# Patient Record
Sex: FEMALE | Race: WHITE | NOT HISPANIC OR LATINO | Employment: OTHER | URBAN - METROPOLITAN AREA
[De-identification: names, ages, dates, MRNs, and addresses within clinical notes are randomized per-mention and may not be internally consistent; named-entity substitution may affect disease eponyms.]

---

## 2018-02-07 PROBLEM — E78.5 DYSLIPIDEMIA: Status: ACTIVE | Noted: 2018-02-07

## 2019-01-28 ENCOUNTER — PATIENT OUTREACH (OUTPATIENT)
Dept: HEALTH INFORMATION MANAGEMENT | Facility: OTHER | Age: 84
End: 2019-01-28

## 2019-01-28 NOTE — PROGRESS NOTES
1. Attempt #:1    2. HealthConnect Verified: yes    3. Verify PCP: yes    4. Review Care Team: yes    5. WebIZ Checked & Epic Updated: Yes  · WebIZ Recommendations: FLU, PREVNAR (PCV13) , TDAP and SHINGRIX (Shingles)  · Is patient due for Tdap? YES. Patient was not notified of copay/out of pocket cost.  · Is patient due for Shingles? YES. Patient was not notified of copay/out of pocket cost.    6. Reviewed/Updated the following with patient:       •   Communication Preference Obtained? YES       •   Preferred Pharmacy? YES       •   Preferred Lab? YES       •   Family History (document living status of immediate family members and if + hx of cancer, diabetes, hypertension, hyperlipidemia, heart attack, stroke) YES    7. Annual Wellness Visit Scheduling  · Scheduling Status:Scheduled     8. Care Gap Scheduling (Attempt to Schedule EACH Overdue Care Gap!)     Health Maintenance Due   Topic Date Due   • IMM DTaP/Tdap/Td Vaccine (1 - Tdap) 01/11/1954   • IMM ZOSTER VACCINES (1 of 2) 01/11/1985   • COLONOSCOPY  06/16/1998   • MAMMOGRAM  10/14/1999   • BONE DENSITY  01/11/2000   • IMM PNEUMOCOCCAL 65+ (ADULT) LOW/MEDIUM RISK SERIES (1 of 2 - PCV13) 01/11/2000   • IMM INFLUENZA (1) 09/01/2018        Scheduled patient for Annual Wellness Visit     9. Spot Coffee Activation: already active    10. Spot Coffee Rodriguez: NO    11. Virtual Visits: NO    12. Opt In to Text Messages: NO    13. Patient was advised: “This is a free wellness visit. The provider will screen for medical conditions to help you stay healthy. If you have other concerns to address you may be asked to discuss these at a separate visit or there may be an additional fee.”     14. Patient was informed to arrive 15 min prior to their scheduled appointment and bring in their medication bottles.

## 2019-01-31 ENCOUNTER — PATIENT OUTREACH (OUTPATIENT)
Dept: HEALTH INFORMATION MANAGEMENT | Facility: OTHER | Age: 84
End: 2019-01-31

## 2021-06-21 ENCOUNTER — APPOINTMENT (OUTPATIENT)
Dept: RADIOLOGY | Facility: MEDICAL CENTER | Age: 86
DRG: 070 | End: 2021-06-21
Attending: NEUROLOGICAL SURGERY
Payer: MEDICARE

## 2021-06-21 ENCOUNTER — HOSPITAL ENCOUNTER (INPATIENT)
Facility: MEDICAL CENTER | Age: 86
LOS: 2 days | DRG: 070 | End: 2021-06-23
Attending: HOSPITALIST | Admitting: STUDENT IN AN ORGANIZED HEALTH CARE EDUCATION/TRAINING PROGRAM
Payer: MEDICARE

## 2021-06-21 PROBLEM — G93.89 FRONTAL MASS OF BRAIN: Status: ACTIVE | Noted: 2021-06-21

## 2021-06-21 PROCEDURE — 700111 HCHG RX REV CODE 636 W/ 250 OVERRIDE (IP): Performed by: STUDENT IN AN ORGANIZED HEALTH CARE EDUCATION/TRAINING PROGRAM

## 2021-06-21 PROCEDURE — 71260 CT THORAX DX C+: CPT | Mod: ME

## 2021-06-21 PROCEDURE — 99222 1ST HOSP IP/OBS MODERATE 55: CPT | Mod: AI | Performed by: STUDENT IN AN ORGANIZED HEALTH CARE EDUCATION/TRAINING PROGRAM

## 2021-06-21 PROCEDURE — 700117 HCHG RX CONTRAST REV CODE 255: Performed by: NEUROLOGICAL SURGERY

## 2021-06-21 PROCEDURE — 770006 HCHG ROOM/CARE - MED/SURG/GYN SEMI*

## 2021-06-21 RX ORDER — ONDANSETRON 2 MG/ML
4 INJECTION INTRAMUSCULAR; INTRAVENOUS EVERY 4 HOURS PRN
Status: DISCONTINUED | OUTPATIENT
Start: 2021-06-21 | End: 2021-06-23 | Stop reason: HOSPADM

## 2021-06-21 RX ORDER — ACETAMINOPHEN 325 MG/1
650 TABLET ORAL EVERY 6 HOURS PRN
Status: ACTIVE | OUTPATIENT
Start: 2021-06-21 | End: 2021-06-21

## 2021-06-21 RX ORDER — ONDANSETRON 2 MG/ML
4 INJECTION INTRAMUSCULAR; INTRAVENOUS EVERY 4 HOURS PRN
Status: ACTIVE | OUTPATIENT
Start: 2021-06-21 | End: 2021-06-21

## 2021-06-21 RX ORDER — DEXAMETHASONE SODIUM PHOSPHATE 4 MG/ML
4 INJECTION, SOLUTION INTRA-ARTICULAR; INTRALESIONAL; INTRAMUSCULAR; INTRAVENOUS; SOFT TISSUE EVERY 6 HOURS
Status: DISCONTINUED | OUTPATIENT
Start: 2021-06-21 | End: 2021-06-22

## 2021-06-21 RX ORDER — LABETALOL HYDROCHLORIDE 5 MG/ML
10 INJECTION, SOLUTION INTRAVENOUS EVERY 4 HOURS PRN
Status: DISCONTINUED | OUTPATIENT
Start: 2021-06-21 | End: 2021-06-23 | Stop reason: HOSPADM

## 2021-06-21 RX ORDER — BISACODYL 10 MG
10 SUPPOSITORY, RECTAL RECTAL
Status: DISCONTINUED | OUTPATIENT
Start: 2021-06-21 | End: 2021-06-23 | Stop reason: HOSPADM

## 2021-06-21 RX ORDER — AMOXICILLIN 250 MG
2 CAPSULE ORAL 2 TIMES DAILY
Status: DISCONTINUED | OUTPATIENT
Start: 2021-06-21 | End: 2021-06-23 | Stop reason: HOSPADM

## 2021-06-21 RX ORDER — ENALAPRILAT 1.25 MG/ML
1.25 INJECTION INTRAVENOUS EVERY 6 HOURS PRN
Status: ACTIVE | OUTPATIENT
Start: 2021-06-21 | End: 2021-06-21

## 2021-06-21 RX ORDER — POLYETHYLENE GLYCOL 3350 17 G/17G
1 POWDER, FOR SOLUTION ORAL
Status: DISCONTINUED | OUTPATIENT
Start: 2021-06-21 | End: 2021-06-23 | Stop reason: HOSPADM

## 2021-06-21 RX ORDER — LEVETIRACETAM 5 MG/ML
500 INJECTION INTRAVASCULAR EVERY 12 HOURS
Status: DISCONTINUED | OUTPATIENT
Start: 2021-06-21 | End: 2021-06-23

## 2021-06-21 RX ADMIN — LEVETIRACETAM INJECTION 500 MG: 5 INJECTION INTRAVENOUS at 23:22

## 2021-06-21 RX ADMIN — DEXAMETHASONE SODIUM PHOSPHATE 4 MG: 4 INJECTION, SOLUTION INTRA-ARTICULAR; INTRALESIONAL; INTRAMUSCULAR; INTRAVENOUS; SOFT TISSUE at 23:23

## 2021-06-21 RX ADMIN — IOHEXOL 80 ML: 350 INJECTION, SOLUTION INTRAVENOUS at 23:08

## 2021-06-21 ASSESSMENT — ENCOUNTER SYMPTOMS
EYE PAIN: 0
HEMOPTYSIS: 0
FOCAL WEAKNESS: 0
SINUS PAIN: 0
BLURRED VISION: 0
MYALGIAS: 0
CHILLS: 0
VOMITING: 0
COUGH: 0
FEVER: 0
NAUSEA: 0
ORTHOPNEA: 0
HEADACHES: 0
DIARRHEA: 0
TREMORS: 0
WEAKNESS: 0

## 2021-06-21 ASSESSMENT — COGNITIVE AND FUNCTIONAL STATUS - GENERAL
SUGGESTED CMS G CODE MODIFIER DAILY ACTIVITY: CH
DAILY ACTIVITIY SCORE: 24
MOBILITY SCORE: 24
SUGGESTED CMS G CODE MODIFIER MOBILITY: CH

## 2021-06-21 ASSESSMENT — LIFESTYLE VARIABLES
TOTAL SCORE: 0
AVERAGE NUMBER OF DAYS PER WEEK YOU HAVE A DRINK CONTAINING ALCOHOL: 0
ALCOHOL_USE: NO
TOTAL SCORE: 0
HAVE PEOPLE ANNOYED YOU BY CRITICIZING YOUR DRINKING: NO
CONSUMPTION TOTAL: NEGATIVE
EVER HAD A DRINK FIRST THING IN THE MORNING TO STEADY YOUR NERVES TO GET RID OF A HANGOVER: NO
TOTAL SCORE: 0
HAVE YOU EVER FELT YOU SHOULD CUT DOWN ON YOUR DRINKING: NO
EVER FELT BAD OR GUILTY ABOUT YOUR DRINKING: NO
DOES PATIENT WANT TO STOP DRINKING: NO
ON A TYPICAL DAY WHEN YOU DRINK ALCOHOL HOW MANY DRINKS DO YOU HAVE: 0
HOW MANY TIMES IN THE PAST YEAR HAVE YOU HAD 5 OR MORE DRINKS IN A DAY: 0

## 2021-06-21 ASSESSMENT — FIBROSIS 4 INDEX: FIB4 SCORE: 1.94

## 2021-06-21 ASSESSMENT — PATIENT HEALTH QUESTIONNAIRE - PHQ9
2. FEELING DOWN, DEPRESSED, IRRITABLE, OR HOPELESS: NOT AT ALL
1. LITTLE INTEREST OR PLEASURE IN DOING THINGS: NOT AT ALL
SUM OF ALL RESPONSES TO PHQ9 QUESTIONS 1 AND 2: 0

## 2021-06-21 NOTE — PROGRESS NOTES
This is a transfer from [facility] with a new finding of brain mass on MRI brain I have spoken to the neurosurgeon Dr. Isidro Weaver for work-up of altered mental status.  Please consult him when the patient arrives.  Question of primary versus metastatic.  May need additional work-up for other malignancy.

## 2021-06-21 NOTE — PROGRESS NOTES
Patient ambulated to unit with daughter and son. AOx3 with some needs for reorientation. Continent at this time. Steady on feet. Oriented to call light and bed controls. Family at bedside at this time. Awaiting MD

## 2021-06-22 ENCOUNTER — APPOINTMENT (OUTPATIENT)
Dept: RADIOLOGY | Facility: MEDICAL CENTER | Age: 86
DRG: 070 | End: 2021-06-22
Attending: NEUROLOGICAL SURGERY
Payer: MEDICARE

## 2021-06-22 PROBLEM — R73.9 HYPERGLYCEMIA: Status: ACTIVE | Noted: 2021-06-22

## 2021-06-22 PROBLEM — R41.0 CONFUSION: Status: ACTIVE | Noted: 2021-06-22

## 2021-06-22 PROBLEM — G93.6 VASOGENIC CEREBRAL EDEMA (HCC): Status: ACTIVE | Noted: 2021-06-22

## 2021-06-22 LAB
ALBUMIN SERPL BCP-MCNC: 3.4 G/DL (ref 3.2–4.9)
ALBUMIN/GLOB SERPL: 1.1 G/DL
ALP SERPL-CCNC: 51 U/L (ref 30–99)
ALT SERPL-CCNC: 14 U/L (ref 2–50)
ANION GAP SERPL CALC-SCNC: 8 MMOL/L (ref 7–16)
AST SERPL-CCNC: 16 U/L (ref 12–45)
BASOPHILS # BLD AUTO: 0.2 % (ref 0–1.8)
BASOPHILS # BLD: 0.01 K/UL (ref 0–0.12)
BILIRUB SERPL-MCNC: 0.7 MG/DL (ref 0.1–1.5)
BUN SERPL-MCNC: 14 MG/DL (ref 8–22)
CALCIUM SERPL-MCNC: 9.1 MG/DL (ref 8.5–10.5)
CHLORIDE SERPL-SCNC: 105 MMOL/L (ref 96–112)
CO2 SERPL-SCNC: 24 MMOL/L (ref 20–33)
CREAT SERPL-MCNC: 0.46 MG/DL (ref 0.5–1.4)
EOSINOPHIL # BLD AUTO: 0 K/UL (ref 0–0.51)
EOSINOPHIL NFR BLD: 0 % (ref 0–6.9)
ERYTHROCYTE [DISTWIDTH] IN BLOOD BY AUTOMATED COUNT: 43.8 FL (ref 35.9–50)
GLOBULIN SER CALC-MCNC: 3 G/DL (ref 1.9–3.5)
GLUCOSE SERPL-MCNC: 115 MG/DL (ref 65–99)
HCT VFR BLD AUTO: 39.2 % (ref 37–47)
HGB BLD-MCNC: 13 G/DL (ref 12–16)
IMM GRANULOCYTES # BLD AUTO: 0.03 K/UL (ref 0–0.11)
IMM GRANULOCYTES NFR BLD AUTO: 0.5 % (ref 0–0.9)
LYMPHOCYTES # BLD AUTO: 0.87 K/UL (ref 1–4.8)
LYMPHOCYTES NFR BLD: 13.6 % (ref 22–41)
MAGNESIUM SERPL-MCNC: 2.2 MG/DL (ref 1.5–2.5)
MCH RBC QN AUTO: 29.9 PG (ref 27–33)
MCHC RBC AUTO-ENTMCNC: 33.2 G/DL (ref 33.6–35)
MCV RBC AUTO: 90.1 FL (ref 81.4–97.8)
MONOCYTES # BLD AUTO: 0.08 K/UL (ref 0–0.85)
MONOCYTES NFR BLD AUTO: 1.3 % (ref 0–13.4)
NEUTROPHILS # BLD AUTO: 5.41 K/UL (ref 2–7.15)
NEUTROPHILS NFR BLD: 84.4 % (ref 44–72)
NRBC # BLD AUTO: 0 K/UL
NRBC BLD-RTO: 0 /100 WBC
PLATELET # BLD AUTO: 197 K/UL (ref 164–446)
PMV BLD AUTO: 9.9 FL (ref 9–12.9)
POTASSIUM SERPL-SCNC: 4.1 MMOL/L (ref 3.6–5.5)
PROT SERPL-MCNC: 6.4 G/DL (ref 6–8.2)
RBC # BLD AUTO: 4.35 M/UL (ref 4.2–5.4)
SODIUM SERPL-SCNC: 137 MMOL/L (ref 135–145)
WBC # BLD AUTO: 6.4 K/UL (ref 4.8–10.8)

## 2021-06-22 PROCEDURE — 83735 ASSAY OF MAGNESIUM: CPT

## 2021-06-22 PROCEDURE — 99233 SBSQ HOSP IP/OBS HIGH 50: CPT | Performed by: INTERNAL MEDICINE

## 2021-06-22 PROCEDURE — 70553 MRI BRAIN STEM W/O & W/DYE: CPT | Mod: MG

## 2021-06-22 PROCEDURE — 36415 COLL VENOUS BLD VENIPUNCTURE: CPT

## 2021-06-22 PROCEDURE — 770006 HCHG ROOM/CARE - MED/SURG/GYN SEMI*

## 2021-06-22 PROCEDURE — A9576 INJ PROHANCE MULTIPACK: HCPCS | Performed by: NEUROLOGICAL SURGERY

## 2021-06-22 PROCEDURE — 700102 HCHG RX REV CODE 250 W/ 637 OVERRIDE(OP): Performed by: STUDENT IN AN ORGANIZED HEALTH CARE EDUCATION/TRAINING PROGRAM

## 2021-06-22 PROCEDURE — 700117 HCHG RX CONTRAST REV CODE 255: Performed by: NEUROLOGICAL SURGERY

## 2021-06-22 PROCEDURE — 700111 HCHG RX REV CODE 636 W/ 250 OVERRIDE (IP): Performed by: STUDENT IN AN ORGANIZED HEALTH CARE EDUCATION/TRAINING PROGRAM

## 2021-06-22 PROCEDURE — 85025 COMPLETE CBC W/AUTO DIFF WBC: CPT

## 2021-06-22 PROCEDURE — A9270 NON-COVERED ITEM OR SERVICE: HCPCS | Performed by: STUDENT IN AN ORGANIZED HEALTH CARE EDUCATION/TRAINING PROGRAM

## 2021-06-22 PROCEDURE — 80053 COMPREHEN METABOLIC PANEL: CPT

## 2021-06-22 RX ADMIN — DOCUSATE SODIUM 50 MG AND SENNOSIDES 8.6 MG 2 TABLET: 8.6; 5 TABLET, FILM COATED ORAL at 04:50

## 2021-06-22 RX ADMIN — LEVETIRACETAM INJECTION 500 MG: 5 INJECTION INTRAVENOUS at 10:10

## 2021-06-22 RX ADMIN — GADOTERIDOL 12 ML: 279.3 INJECTION, SOLUTION INTRAVENOUS at 17:30

## 2021-06-22 RX ADMIN — DOCUSATE SODIUM 50 MG AND SENNOSIDES 8.6 MG 2 TABLET: 8.6; 5 TABLET, FILM COATED ORAL at 17:31

## 2021-06-22 RX ADMIN — LEVETIRACETAM INJECTION 500 MG: 5 INJECTION INTRAVENOUS at 17:31

## 2021-06-22 RX ADMIN — DEXAMETHASONE SODIUM PHOSPHATE 4 MG: 4 INJECTION, SOLUTION INTRA-ARTICULAR; INTRALESIONAL; INTRAMUSCULAR; INTRAVENOUS; SOFT TISSUE at 04:50

## 2021-06-22 ASSESSMENT — ENCOUNTER SYMPTOMS
NAUSEA: 0
VOMITING: 0
CHILLS: 0
FEVER: 0
ABDOMINAL PAIN: 0

## 2021-06-22 NOTE — CARE PLAN
Problem: Knowledge Deficit - Standard  Goal: Patient and family/care givers will demonstrate understanding of plan of care, disease process/condition, diagnostic tests and medications  Outcome: Progressing   The patient is Stable - Low risk of patient condition declining or worsening    Shift Goals  Clinical Goals: complete MRI  Patient Goals: ambulate, rest, get MRI done    Progress made toward(s) clinical / shift goals:  awaiting MRI to be completed. Education provided with interactions for patient     Patient is not progressing towards the following goals:

## 2021-06-22 NOTE — PROGRESS NOTES
SBAR report received, patient in bed sleeping at this time neurosurgeon at bedside this AM. Plan for MRI to be completed. Patient with call light within reach, bed alarm in place, and seizure precautions continued.

## 2021-06-22 NOTE — PROGRESS NOTES
Spiritual Care Note        Patient's Name: Farhan Tanner   MRN: [id number]    YOB: 1935   Age and Gender: 86 y.o. female   Unit/Service Area: Ortho-Spine   Room (and Bed): [facility]   Ethnicity or Nationality: white   Primary Language: English   Amish/Spiritual Preference: Confucianism   Place of Residence: [place], [place]   Family/Friends/Others Present: daughter, Solange   Medical Diagnosis: frontal mass of brain   Code Status: Full Code    Date of Admission: 6/21/2021   Length of Stay: 1 day        Spiritual Screen   Was spiritual care education provided to the patient?     Yes      Visited with:     Patient and daughter    Nature of the Visit:     Initial, On shift    Continue Visiting:     Yes    General Visit:     Yes    Referral from/Origin of Request:     Epic nursing    Observations/Symptoms:     Walked with patient and daughter around unit.  Patient pleasant, alert.    Interaction/Conversation:     Patient talked about her symptoms which led to her admission.  She will be having an MRI today to investigate further.  She indicated that she would like prayer.    Assessment:  Need -- Seeking Spiritual Support    Distress -- Anxiety     Amish Needs:     Prayer    Intended Effects:     Build Relationship of Care and Support, Convey a Calming Presence    Interventions:     Compassionate presence, emotional support, prayer    Outcomes:     Connectedness with God, Spiritual Comfort, Progress with Trust    Plan:     Continue to follow.  Hand off to Chaplain Robertson.    Total Time:     20 minutes      Spiritual Care Provider  Chaplain BHUPENDRA Cervantes  [phone]   [email or web address]

## 2021-06-22 NOTE — PROGRESS NOTES
4 Eyes Skin Assessment Completed by KELSIE Carey and KELSIE Walters.    Head WDL  Ears WDL  Nose WDL  Mouth WDL  Neck WDL  Breast/Chest WDL  Shoulder Blades WDL  Spine WDL  (R) Arm/Elbow/Hand WDL  (L) Arm/Elbow/Hand WDL  Abdomen WDL  Groin WDL  Scrotum/Coccyx/Buttocks WDL  (R) Leg WDL  (L) Leg WDL  (R) Heel/Foot/Toe WDL  (L) Heel/Foot/Toe WDL          Devices In Places none      Interventions In Place N/A    Possible Skin Injury No    Pictures Uploaded Into Epic N/A  Wound Consult Placed N/A  RN Wound Prevention Protocol Ordered No

## 2021-06-22 NOTE — CONSULTS
DATE OF SERVICE:  06/22/2021     NEUROSURGERY CONSULTATION     REASON FOR CONSULTATION:  Left frontal mass.     HISTORY OF PRESENT ILLNESS:  This is an 86-year-old woman who is independent,   very healthy woman typically, who presented with some altered mental status   due to concern from her family.  She had an MRI without contrast done in   [facility], which demonstrated a potential left frontal lesion with some   minor mass effect.  It is unclear due to the fact that this was a   noncontrasted study the makeup of this lesion and whether or not it is   metastatic versus primary tumor.  The patient did get a chest, abdomen and   pelvis last night, which showed no significant masses or oncologic process to   our knowledge.  We will wait on final read from that to confirm this.  If   there is a lesion in her body that is more accessible, we would agree with   biopsying that first.  Aside from that, it is unclear what type of lesion this   is, whether or not a primary versus secondary mass based on the current   imaging.     REVIEW OF SYSTEMS:  A 12-point review of systems was negative for focal   deficit, seizure activity.  She does have some cognitive issues currently and   she does have some concern for speech lapse.     PAST MEDICAL HISTORY:  The patient has no oncologic history.     PAST SURGICAL HISTORY:  Noncontributory.     MEDICATIONS:  Please see EMR.     PHYSICAL EXAMINATION:  GENERAL:  The patient is alert.  She is oriented.  She is able to speak   without any forms of aphasia.  HEENT:  Atraumatic, normocephalic.  PULMONARY:  Normal breathing.  CARDIOVASCULAR:  2+ pulses.  NEUROLOGIC:  She has a nonfocal motor exam.     IMAGING:  The patient _____ an MRI without contrast, which demonstrates   findings of a left frontal mass, which may be primary based on its appearance.     ASSESSMENT AND PLAN:  At this time, the patient needs completion of her   imaging workup with an MRI of the brain with and without  contrast, Stealth   protocol.  Once that is completed, we will have a better idea whether or not   this looks to be primary versus secondary.  If there is any mass lesion found   on the review of the chest, abdomen and pelvis, we would endorse moving   forward with an IR biopsy of those masses to confirm what malignancy the   patient has.  If there is a resectable lesion in her head, the family wishes   to move forward with surgical resection and/or biopsy for diagnosis, and if   there is nothing on the chest, abdomen, pelvis or it is appropriate to move   forward with one of those procedures, we will discuss it with the patient's   family at length, likely tomorrow after imaging is completed.  We did have a   lengthy discussion with the patient's son this morning about completion of   workup and need for completion of that prior to moving forward with any form   of intervention given she is advanced age and we currently do not have a very   good idea as to what the source of the lesion is and whether or not it would   be appropriate to do surgical intervention currently.  Once the imaging is   completed, we will addend our note. We would endorse starting her on Keppra   500 b.i.d.  We would stop any steroid therapy in case this is a lymphoma which   would cause this likely to become nondiagnostic and completion of the MRI   brain with and without contrast, final read of the chest, abdomen and pelvis,   normal pressures and q. 4 hour neuro checks.     Total of 50 minutes were spent in direct patient care, coordination and   consultation.        ______________________________  MD CORNELIO Fiore/AVNI/SANDI    DD:  06/22/2021 09:31  DT:  06/22/2021 11:13    Job#:  [id number]

## 2021-06-22 NOTE — ASSESSMENT & PLAN NOTE
Patient with intermittent confusion found to have new left brain mass lesion with vasogenic edema and mass-effect  Patient received Decadron in the ER at [facility]  CT chest abdomen and pelvis with indeterminate liver lesions, no clear evidence of malignancy.  Decadron per neurosurgery  Continue Keppra 500 mg every 12 hours  Follow-up MRI brain  Neurochecks q4h  Fall precautions, aspiration precautions  Continue to hold anticoagulants  Follow-up with Dr. Weaver neurosurgery, appreciate recommendations.

## 2021-06-22 NOTE — PROGRESS NOTES
2215 Spoke with MRI regarding what time they will be able to see pt. They stated that they will be able to take her in the morning.      2250 Pt leaving for CT scan via wheelchair. Pt is steady transferring via wheelchair.     2311 Pt arrived back on unit from CT scan. Pt is laying comfortable in bed.

## 2021-06-22 NOTE — CARE PLAN
The patient is Stable - Low risk of patient condition declining or worsening    Shift Goals  Clinical Goals: comfort    Progress made toward(s) clinical / shift goals:      Problem: Knowledge Deficit - Standard  Goal: Patient and family/care givers will demonstrate understanding of plan of care, disease process/condition, diagnostic tests and medications  Outcome: Progressing  Note: Pt updated on plan of care. Pt encouraged to ask questions and questions were answered. Call light within reach. Pt reminded to use call light whenever needing assistance.       Pt able to get some sleep throughout the night.     Patient is not progressing towards the following goals:

## 2021-06-22 NOTE — H&P
Hospital Medicine History & Physical Note    Date of Service  6/21/2021    Primary Care Physician  Pretty Kirk D.O.    Consultants  Dr. Isidro Weaver: Neurosurgery    Code Status  Full Code    Chief Complaint  No chief complaint on file.      History of Presenting Illness  86 y.o. female who presented 6/21/2021 with no significant past medical history was transferred from [facility] for evaluation of new finding of brain mass on MRI brain. Patient was at her baseline about 8 days ago when she first was found to be confused after she was in the sun for prolonged period of time. After this the patient went into camping trip and and when she returned home her daughter noticed that she is still confused and is not making sense for which patient presented to [facility] and MRI of the brain was done which showed left frontal mass lesion with vasogenic edema and mass-effect. Dr. Weaver was consulted who recommended transfer to [facility] for further work-up. In the floor patient was noted with no complaints. She denies any fever, chills, nausea, vomiting, palpitations, syncope, abdominal pain bowel or bladder habit changes. She denies any intake of blood thinners, and only uses vitamin tablets.  Discussed with Dr. Weaver who recommended for MRI of brain, CT chest abdomen and pelvis to rule out primary.  Review of Systems  Review of Systems   Constitutional: Negative for chills and fever.   HENT: Negative for ear pain and sinus pain.    Eyes: Negative for blurred vision and pain.   Respiratory: Negative for cough and hemoptysis.    Cardiovascular: Negative for chest pain and orthopnea.   Gastrointestinal: Negative for diarrhea, nausea and vomiting.   Genitourinary: Negative for dysuria and hematuria.   Musculoskeletal: Negative for myalgias.   Skin: Negative for itching.   Neurological: Negative for tremors, focal weakness, weakness and headaches.        Confusion   Psychiatric/Behavioral:  Negative for suicidal ideas.       Past Medical History   has no past medical history on file.    Surgical History   has a past surgical history that includes eye surgery (2015).     Family History  family history includes Cancer in her brother, father, and son; Heart Disease in her father; Hypertension in her mother; Other in her sister.     Social History   reports that she quit smoking about 54 years ago. Her smoking use included cigarettes. She has a 6.00 pack-year smoking history. She has never used smokeless tobacco. She reports that she does not drink alcohol and does not use drugs.    Allergies  No Known Allergies    Medications  Prior to Admission Medications   Prescriptions Last Dose Informant Patient Reported? Taking?   Lutein 20 MG Cap  Family Member Yes No   Sig: Take  by mouth.   Omega-3 Fatty Acids (FISH OIL) 1000 MG Cap capsule  Family Member Yes No   Sig: Take 1,000 mg by mouth 3 times a day, with meals.   therapeutic multivitamin-minerals (THERAGRAN-M) Tab  Family Member Yes No   Sig: Take 1 Tab by mouth every day.      Facility-Administered Medications: None       Physical Exam  Temp:  [36.8 °C (98.3 °F)-37 °C (98.6 °F)] 36.8 °C (98.3 °F)  Pulse:  [68-77] 68  Resp:  [16-17] 16  BP: (117-133)/(68-75) 117/68  SpO2:  [97 %] 97 %    Physical Exam  Vitals and nursing note reviewed.   Constitutional:       Appearance: Normal appearance.   HENT:      Head: Normocephalic and atraumatic.      Right Ear: External ear normal.      Left Ear: External ear normal.      Mouth/Throat:      Mouth: Mucous membranes are moist.   Eyes:      Extraocular Movements: Extraocular movements intact.      Conjunctiva/sclera: Conjunctivae normal.      Pupils: Pupils are equal, round, and reactive to light.   Cardiovascular:      Rate and Rhythm: Normal rate and regular rhythm.      Pulses: Normal pulses.      Heart sounds: Normal heart sounds.   Pulmonary:      Effort: Pulmonary effort is normal.      Breath sounds: Normal  breath sounds.   Abdominal:      General: Abdomen is flat. Bowel sounds are normal.      Palpations: Abdomen is soft.   Musculoskeletal:         General: Normal range of motion.      Cervical back: Normal range of motion and neck supple.   Skin:     General: Skin is warm.      Capillary Refill: Capillary refill takes less than 2 seconds.   Neurological:      General: No focal deficit present.      Mental Status: She is alert and oriented to person, place, and time.   Psychiatric:         Mood and Affect: Mood normal.         Laboratory:  Recent Labs     06/21/21  0950   WBC 4.9   RBC 4.18*   HEMOGLOBIN 12.5*   HEMATOCRIT 38.0*   MCV 90.9   MCH 29.9   MCHC 32.9*   RDW 13.3   PLATELETCT 184   MPV 9.3     Recent Labs     06/21/21  0950   SODIUM 141   POTASSIUM 4.3   CHLORIDE 105   CO2 27   GLUCOSE 88   BUN 13   CREATININE 0.7   CALCIUM 8.5     Recent Labs     06/21/21  0950   ALTSGPT 21   ASTSGOT 19   ALKPHOSPHAT 51   TBILIRUBIN 0.8   GLUCOSE 88         No results for input(s): NTPROBNP in the last 72 hours.      No results for input(s): TROPONINT in the last 72 hours.    Imaging:  CT-CHEST,ABDOMEN,PELVIS WITH    (Results Pending)   MR-BRAIN-WITH & W/O    (Results Pending)   MR-STEALTH BRAIN WITH & W/O    (Results Pending)         Assessment/Plan:  I anticipate this patient will require at least two midnights for appropriate medical management, necessitating inpatient admission.    Frontal mass of brain- (present on admission)  Assessment & Plan  Patient with intermittent confusion found to have new left brain mass lesion with vasogenic edema and mass-effect  Patient received Decadron in the ER at [facility]  Started on Decadron 4 mg q. six hourly  Continue with Keppra 500 mg every 12 hourly  Follow-up Dr. Weaver neurosurgery  Follow-up MRI brain, CT chest abdomen and pelvis  Neurochecks q. fourth hourly  Fall precautions, aspiration precautions  We'll hold anticoagulants in view of high risk of  bleeding

## 2021-06-23 VITALS
OXYGEN SATURATION: 94 % | HEART RATE: 66 BPM | RESPIRATION RATE: 16 BRPM | DIASTOLIC BLOOD PRESSURE: 67 MMHG | WEIGHT: 138.89 LBS | SYSTOLIC BLOOD PRESSURE: 109 MMHG | BODY MASS INDEX: 24.6 KG/M2 | TEMPERATURE: 98.2 F

## 2021-06-23 LAB
ALBUMIN SERPL BCP-MCNC: 3.3 G/DL (ref 3.2–4.9)
ALBUMIN/GLOB SERPL: 1.2 G/DL
ALP SERPL-CCNC: 49 U/L (ref 30–99)
ALT SERPL-CCNC: 13 U/L (ref 2–50)
ANION GAP SERPL CALC-SCNC: 7 MMOL/L (ref 7–16)
AST SERPL-CCNC: 14 U/L (ref 12–45)
BILIRUB SERPL-MCNC: 0.6 MG/DL (ref 0.1–1.5)
BUN SERPL-MCNC: 18 MG/DL (ref 8–22)
CALCIUM SERPL-MCNC: 9 MG/DL (ref 8.5–10.5)
CHLORIDE SERPL-SCNC: 107 MMOL/L (ref 96–112)
CO2 SERPL-SCNC: 26 MMOL/L (ref 20–33)
CREAT SERPL-MCNC: 0.66 MG/DL (ref 0.5–1.4)
ERYTHROCYTE [DISTWIDTH] IN BLOOD BY AUTOMATED COUNT: 46.2 FL (ref 35.9–50)
GLOBULIN SER CALC-MCNC: 2.7 G/DL (ref 1.9–3.5)
GLUCOSE SERPL-MCNC: 86 MG/DL (ref 65–99)
HCT VFR BLD AUTO: 37.4 % (ref 37–47)
HGB BLD-MCNC: 12.1 G/DL (ref 12–16)
MCH RBC QN AUTO: 29.8 PG (ref 27–33)
MCHC RBC AUTO-ENTMCNC: 32.4 G/DL (ref 33.6–35)
MCV RBC AUTO: 92.1 FL (ref 81.4–97.8)
PLATELET # BLD AUTO: 187 K/UL (ref 164–446)
PMV BLD AUTO: 10 FL (ref 9–12.9)
POTASSIUM SERPL-SCNC: 4.3 MMOL/L (ref 3.6–5.5)
PROT SERPL-MCNC: 6 G/DL (ref 6–8.2)
RBC # BLD AUTO: 4.06 M/UL (ref 4.2–5.4)
SODIUM SERPL-SCNC: 140 MMOL/L (ref 135–145)
WBC # BLD AUTO: 7.2 K/UL (ref 4.8–10.8)

## 2021-06-23 PROCEDURE — 85027 COMPLETE CBC AUTOMATED: CPT

## 2021-06-23 PROCEDURE — 80053 COMPREHEN METABOLIC PANEL: CPT

## 2021-06-23 PROCEDURE — 99239 HOSP IP/OBS DSCHRG MGMT >30: CPT | Performed by: INTERNAL MEDICINE

## 2021-06-23 PROCEDURE — 700111 HCHG RX REV CODE 636 W/ 250 OVERRIDE (IP): Performed by: STUDENT IN AN ORGANIZED HEALTH CARE EDUCATION/TRAINING PROGRAM

## 2021-06-23 PROCEDURE — 36415 COLL VENOUS BLD VENIPUNCTURE: CPT

## 2021-06-23 RX ORDER — LEVETIRACETAM 500 MG/1
500 TABLET ORAL 2 TIMES DAILY
Status: DISCONTINUED | OUTPATIENT
Start: 2021-06-23 | End: 2021-06-23 | Stop reason: HOSPADM

## 2021-06-23 RX ORDER — ACETAMINOPHEN 500 MG
1000 TABLET ORAL EVERY 6 HOURS PRN
Qty: 30 TABLET | Refills: 0 | COMMUNITY
Start: 2021-06-23 | End: 2021-08-24

## 2021-06-23 RX ORDER — LEVETIRACETAM 500 MG/1
500 TABLET ORAL 2 TIMES DAILY
Qty: 60 TABLET | Refills: 0 | Status: SHIPPED
Start: 2021-06-23 | End: 2021-12-22

## 2021-06-23 RX ADMIN — LEVETIRACETAM INJECTION 500 MG: 5 INJECTION INTRAVENOUS at 07:27

## 2021-06-23 NOTE — DISCHARGE SUMMARY
Discharge Summary    CHIEF COMPLAINT ON ADMISSION  No chief complaint on file.      Reason for Admission  Brain Mass     Admission Date  6/21/2021    CODE STATUS  Full Code    HPI & HOSPITAL COURSE  86 y.o. female who presented 6/21/2021 with no significant past medical history was transferred from [facility] for evaluation of new finding of brain mass on MRI brain. Patient was at her baseline about 8 days ago when she first was found to be confused after she was in the sun for prolonged period of time. After this the patient went into camping trip and and when she returned home her daughter noticed that she is still confused and is not making sense for which patient presented to [facility] and MRI of the brain was done which showed left frontal mass lesion with vasogenic edema and mass-effect. Dr. Weaver was consulted who recommended transfer to [facility] for further work-up. In the floor patient was noted with no complaints. She denies any fever, chills, nausea, vomiting, palpitations, syncope, abdominal pain bowel or bladder habit changes. She denies any intake of blood thinners, and only uses vitamin tablets.  Discussed with Dr. Weaver who recommended for MRI of brain, CT chest abdomen and pelvis to rule out primary.      Therefore, she is discharged in good and stable condition to home with close outpatient follow-up.    The patient met 2-midnight criteria for an inpatient stay at the time of discharge.    Discharge Date  6/23/2021    FOLLOW UP ITEMS POST DISCHARGE  confusion    DISCHARGE DIAGNOSES  Principal Problem:    Frontal mass of brain POA: Yes  Active Problems:    Vasogenic cerebral edema (HCC) POA: Yes    Confusion POA: Yes    Dyslipidemia POA: Yes    Hyperglycemia POA: No  Resolved Problems:    * No resolved hospital problems. *      FOLLOW UP  Isidro Weaver M.D.  [address]  [facility] [address]  [phone]    Schedule an appointment as soon as possible for a  visit      Pretty Kirk D.O.  [address] [facility]  [place] [address]  [facility] [id number] [phone]            MEDICATIONS ON DISCHARGE     Medication List      START taking these medications      Instructions   levETIRAcetam 500 MG Tabs  Commonly known as: KEPPRA   Take 1 tablet by mouth 2 times a day.  Dose: 500 mg        CONTINUE taking these medications      Instructions   acetaminophen 500 MG Tabs  Commonly known as: TYLENOL   Take 2 Tablets by mouth every 6 hours as needed for Mild Pain or Moderate Pain.  Dose: 1,000 mg     fish oil 1000 MG Caps capsule   Take 1,000 mg by mouth 3 times a day, with meals.  Dose: 1,000 mg     Lutein 20 MG Caps   Take  by mouth.     therapeutic multivitamin-minerals Tabs   Take 1 Tab by mouth every day.  Dose: 1 tablet            Allergies  No Known Allergies    DIET  Orders Placed This Encounter   Procedures   • Diet Order Diet: Regular     Standing Status:   Standing     Number of Occurrences:   1     Order Specific Question:   Diet:     Answer:   Regular [1]       ACTIVITY  As tolerated.  Weight bearing as tolerated    CONSULTATIONS  Neurosurgery    PROCEDURES  None 2    LABORATORY  Lab Results   Component Value Date    SODIUM 140 06/23/2021    POTASSIUM 4.3 06/23/2021    CHLORIDE 107 06/23/2021    CO2 26 06/23/2021    GLUCOSE 86 06/23/2021    BUN 18 06/23/2021    CREATININE 0.66 06/23/2021        Lab Results   Component Value Date    WBC 7.2 06/23/2021    HEMOGLOBIN 12.1 06/23/2021    HEMATOCRIT 37.4 06/23/2021    PLATELETCT 187 06/23/2021      MR-BRAIN-WITH & W/O   Final Result      1.  Large LEFT frontal hemorrhagic lesion with adjacent subarachnoid hemorrhage and mild LEFT-to-RIGHT midline shift, without definite enhancement.  Findings favor intraparenchymal hemorrhage.  Associated mass is felt unlikely.   2.  Diffuse atrophy with mild white matter microvascular ischemic changes.   3.  Small chronic LEFT cerebellar infarct.      These findings were discussed with  JENNIFER KENNEDY on 6/23/2021 9:58 AM.      MR-STEALTH BRAIN WITH & W/O   Final Result      LEFT frontal hemorrhagic lesion measuring approximately 5 cm, with mild associated edema and midline shift.  No definite abnormal enhancement.  Findings most suggestive of intraparenchymal hemorrhage.  Underlying mass is felt unlikely.      These findings were discussed with JENNIFER KENNEDY on 6/23/2021 9:57 AM.            CT-CHEST,ABDOMEN,PELVIS WITH   Final Result      No definite evidence of metastatic disease or identifiable primary malignancy      Subtle hepatic hypodensities could indicate metastases, hemangiomas or small cysts. Correlation with liver function tests and enhanced hepatic protocol MRI is recommended      Moderate colonic diverticulosis without evidence of diverticulitis      Atherosclerosis without aneurysm.      Breathing motion artifact         Total time of the discharge process exceeds 45 minutes.

## 2021-06-23 NOTE — ASSESSMENT & PLAN NOTE
Recent onset, prior to admission.  Likely secondary to vasogenic edema associated with brain mass.  Treat underlying problems as noted.

## 2021-06-23 NOTE — DISCHARGE INSTRUCTIONS
Discharge Instructions    Discharged to home by car with escort. Discharged via wheelchair, hospital escort: Yes.  Special equipment needed: Not Applicable    Be sure to schedule a follow-up appointment with your primary care doctor or any specialists as instructed.     Discharge Plan:   - Follow up with doctor Weaver, call to schedule an appointment         I understand that a diet low in cholesterol, fat, and sodium is recommended for good health. Unless I have been given specific instructions below for another diet, I accept this instruction as my diet prescription.   Other diet: Regular diet     Special Instructions: None    · Is patient discharged on Warfarin / Coumadin?   No     Depression / Suicide Risk    As you are discharged from this [facility] facility, it is important to learn how to keep safe from harming yourself.    Recognize the warning signs:  · Abrupt changes in personality, positive or negative- including increase in energy   · Giving away possessions  · Change in eating patterns- significant weight changes-  positive or negative  · Change in sleeping patterns- unable to sleep or sleeping all the time   · Unwillingness or inability to communicate  · Depression  · Unusual sadness, discouragement and loneliness  · Talk of wanting to die  · Neglect of personal appearance   · Rebelliousness- reckless behavior  · Withdrawal from people/activities they love  · Confusion- inability to concentrate     If you or a loved one observes any of these behaviors or has concerns about self-harm, here's what you can do:  · Talk about it- your feelings and reasons for harming yourself  · Remove any means that you might use to hurt yourself (examples: pills, rope, extension cords, firearm)  · Get professional help from the community (Mental Health, Substance Abuse, psychological counseling)  · Do not be alone:Call your Safe Contact- someone whom you trust who will be there for you.  · Call your local CRISIS  HOTLINE 384-3032 or 605-482-6630  · Call your local Children's Mobile Crisis Response Team [place] (490) 534-3838 or www.[employer]  · Call the toll free National Suicide Prevention Hotlines   · National Suicide Prevention Lifeline 915-671-YEXF (9243)  · National [employer] 800-SUICIDE (939-1550)

## 2021-06-23 NOTE — CARE PLAN
Problem: Knowledge Deficit - Standard  Goal: Patient and family/care givers will demonstrate understanding of plan of care, disease process/condition, diagnostic tests and medications  Outcome: Progressing  Pending diagnostic results   The patient is Stable - Low risk of patient condition declining or worsening    Shift Goals  Clinical Goals: complete MRI  Patient Goals: ambulate, rest, get MRI done    Progress made toward(s) clinical / shift goals:      Patient is not progressing towards the following goals:

## 2021-06-23 NOTE — PROGRESS NOTES
Neurosurgery Progress Note    Subjective:  No events    Exam:  Neuro intact    BP  Min: 85/52  Max: 113/72  Pulse  Av.5  Min: 57  Max: 65  Resp  Av.3  Min: 16  Max: 17  Temp  Av.7 °C (98 °F)  Min: 36.2 °C (97.2 °F)  Max: 37.1 °C (98.8 °F)  SpO2  Av %  Min: 96 %  Max: 99 %    No data recorded    Recent Labs     21  0950 21  0435 21  0520   WBC 4.9 6.4 7.2   RBC 4.18* 4.35 4.06*   HEMOGLOBIN 12.5* 13.0 12.1   HEMATOCRIT 38.0* 39.2 37.4   MCV 90.9 90.1 92.1   MCH 29.9 29.9 29.8   MCHC 32.9* 33.2* 32.4*   RDW 13.3 43.8 46.2   PLATELETCT 184 197 187   MPV 9.3 9.9 10.0     Recent Labs     21  0950 21  0435 21  0520   SODIUM 141 137 140   POTASSIUM 4.3 4.1 4.3   CHLORIDE 105 105 107   CO2 27 24 26   GLUCOSE 88 115* 86   BUN 13 14 18   CREATININE 0.7 0.46* 0.66   CALCIUM 8.5 9.1 9.0               Intake/Output                       21 07 - 21 0659 21 - 2159      Total  Total                 Intake    P.O.  --  220 220  --  -- --    P.O. -- 220 220 -- -- --    Total Intake -- 220 220 -- -- --       Output    Stool  --  -- --  --  -- --    Number of Times Stooled -- 0 x 0 x -- -- --    Total Output -- -- -- -- -- --       Net I/O     -- 220 220 -- -- --            Intake/Output Summary (Last 24 hours) at 2021 0650  Last data filed at 2021  Gross per 24 hour   Intake 220 ml   Output --   Net 220 ml            • senna-docusate  2 tablet BID    And   • polyethylene glycol/lytes  1 Packet QDAY PRN    And   • magnesium hydroxide  30 mL QDAY PRN    And   • bisacodyl  10 mg QDAY PRN   • labetalol  10 mg Q4HRS PRN   • ondansetron  4 mg Q4HRS PRN   • levETIRAcetam (Keppra) IV  500 mg Q12HRS       Assessment and Plan:  Hospital day # 2  POD# na  Chemical prophylactic DVT therapy: No  Start date/time: tbd    MRI brain show rim-enhancing hypointensity concerning for primary brain malignancy versus  possible lymphoma.    CT of the chest abdomen pelvis demonstrates no surroundings concerning for primary malignancy  Hold dexamethasone  Given current finding and overall patient state of health would be reasonable to go forward with a left craniotomy for open biopsy versus resection for diagnoses of lesion.  If this appears to be glioblastoma a move towards attempted gross total resection would be reasonable given her best prognosis and survival outcomes.  We will need to discuss this in detail with the patient's son this afternoon to discuss goals of care and whether or not they would want to move forward with resection versus simple biopsy.    Total of 30 minutes spent direct patient care coordination consultation  Isidro Weaver

## 2021-06-23 NOTE — PROGRESS NOTES
Went over discharge instruction with patient, all questions answered. Patient is okay to be discharged per Dr. Weaver as she no longer needs biopsy. Follow up visit to be set up by patient. Patient escorted down by staff, and picked up by family to go home. All belongings accounted for.

## 2021-06-23 NOTE — ASSESSMENT & PLAN NOTE
Associated with brain mass of unknown etiology.  Decadron discontinued per neurosurgery, in case of interference with lymphoma diagnosis if present  Neurochecks q4h

## 2021-06-23 NOTE — PROGRESS NOTES
[facility] Medicine Daily Progress Note    Date of Service  6/22/2021    Chief Complaint  86 y.o. female admitted 6/21/2021 with confusion (now resolved), and brain mass    Hospital Course  86 y.o. female who presented 6/21/2021 with no significant past medical history was transferred from [facility] for evaluation of new finding of brain mass on MRI brain. Patient was at her baseline about 8 days ago when she first was found to be confused after she was in the sun for prolonged period of time. After this the patient went into camping trip and and when she returned home her daughter noticed that she is still confused and is not making sense for which patient presented to [facility] and MRI of the brain was done which showed left frontal mass lesion with vasogenic edema and mass-effect. Dr. Weaver was consulted who recommended transfer to [facility] for further work-up. In the floor patient was noted with no complaints. She denies any fever, chills, nausea, vomiting, palpitations, syncope, abdominal pain bowel or bladder habit changes. She denies any intake of blood thinners, and only uses vitamin tablets.  Discussed with Dr. Weaver who recommended for MRI of brain, CT chest abdomen and pelvis to rule out primary.      Interval Problem Update  CT chest, abdomen, pelvis without clear evidence of malignancy.  Indeterminate liver lesions, discussed with patient.  MRIs of brain performed, not yet read by radiologist.    I have personally seen and examined the patient at bedside. I discussed the plan of care with patient, multidisciplinary team including bedside RN, charge RN, .      Consultants/Specialty  Neurosurgery    Code Status  Full Code    Disposition  Pending possible surgical procedures.  Has high 6 click scores, indicative of good baseline function.    Review of Systems  Review of Systems   Constitutional: Negative for chills and fever.   Gastrointestinal: Negative for  abdominal pain, nausea and vomiting.   All other systems reviewed and are negative.       Physical Exam  Temp:  [36.4 °C (97.6 °F)-37.1 °C (98.8 °F)] 37.1 °C (98.8 °F)  Pulse:  [57-73] 63  Resp:  [16-19] 16  BP: ()/(52-75) 85/52  SpO2:  [94 %-99 %] 98 %    Physical Exam  Vitals and nursing note reviewed.   Constitutional:       General: She is not in acute distress.     Appearance: She is normal weight.   HENT:      Head: Normocephalic and atraumatic.      Right Ear: External ear normal.      Left Ear: External ear normal.      Nose: Nose normal.      Mouth/Throat:      Mouth: Mucous membranes are moist.      Pharynx: Oropharynx is clear.   Eyes:      General: No scleral icterus.     Conjunctiva/sclera: Conjunctivae normal.   Cardiovascular:      Rate and Rhythm: Normal rate and regular rhythm.   Pulmonary:      Effort: Pulmonary effort is normal.      Breath sounds: Normal breath sounds.   Abdominal:      Palpations: Abdomen is soft.      Tenderness: There is no abdominal tenderness. There is no guarding.   Musculoskeletal:         General: No swelling or deformity.      Cervical back: Normal range of motion and neck supple.   Skin:     General: Skin is warm and dry.   Neurological:      General: No focal deficit present.      Mental Status: She is alert and oriented to person, place, and time.   Psychiatric:         Mood and Affect: Mood normal.         Behavior: Behavior normal.         Fluids  No intake or output data in the 24 hours ending 06/22/21 1755    Laboratory  Recent Labs     06/21/21  0950 06/22/21  0435   WBC 4.9 6.4   RBC 4.18* 4.35   HEMOGLOBIN 12.5* 13.0   HEMATOCRIT 38.0* 39.2   MCV 90.9 90.1   MCH 29.9 29.9   MCHC 32.9* 33.2*   RDW 13.3 43.8   PLATELETCT 184 197   MPV 9.3 9.9     Recent Labs     06/21/21  0950 06/22/21  0435   SODIUM 141 137   POTASSIUM 4.3 4.1   CHLORIDE 105 105   CO2 27 24   GLUCOSE 88 115*   BUN 13 14   CREATININE 0.7 0.46*   CALCIUM 8.5 9.1                    Imaging  CT-CHEST,ABDOMEN,PELVIS WITH   Final Result      No definite evidence of metastatic disease or identifiable primary malignancy      Subtle hepatic hypodensities could indicate metastases, hemangiomas or small cysts. Correlation with liver function tests and enhanced hepatic protocol MRI is recommended      Moderate colonic diverticulosis without evidence of diverticulitis      Atherosclerosis without aneurysm.      Breathing motion artifact      MR-BRAIN-WITH & W/O    (Results Pending)   MR-STEALTH BRAIN WITH & W/O    (Results Pending)        Assessment/Plan  * Frontal mass of brain- (present on admission)  Assessment & Plan  Patient with intermittent confusion found to have new left brain mass lesion with vasogenic edema and mass-effect  Patient received Decadron in the ER at [facility]  CT chest abdomen and pelvis with indeterminate liver lesions, no clear evidence of malignancy.  Decadron per neurosurgery  Continue Keppra 500 mg every 12 hours  Follow-up MRI brain  Neurochecks q4h  Fall precautions, aspiration precautions  Continue to hold anticoagulants  Follow-up with Dr. Weaver neurosurgery, appreciate recommendations.    Vasogenic cerebral edema (HCC)- (present on admission)  Assessment & Plan  Associated with brain mass of unknown etiology.  Decadron discontinued per neurosurgery, in case of interference with lymphoma diagnosis if present  Neurochecks q4h    Confusion- (present on admission)  Assessment & Plan  Recent onset, prior to admission.  Likely secondary to vasogenic edema associated with brain mass.  Treat underlying problems as noted.    Hyperglycemia  Assessment & Plan  Mild, in the setting of new steroid therapy.  No need for insulin therapy at this time.  Monitor daily.       VTE prophylaxis: SCDs, pharmacologic ppx held in the setting of possible neurosurgical intervention.

## 2021-06-23 NOTE — ASSESSMENT & PLAN NOTE
Mild, in the setting of new steroid therapy.  No need for insulin therapy at this time.  Monitor daily.

## 2021-06-23 NOTE — HOSPITAL COURSE
86 y.o. female who presented 6/21/2021 with no significant past medical history was transferred from [facility] for evaluation of new finding of brain mass on MRI brain. Patient was at her baseline about 8 days ago when she first was found to be confused after she was in the sun for prolonged period of time. After this the patient went into camping trip and and when she returned home her daughter noticed that she is still confused and is not making sense for which patient presented to [facility] and MRI of the brain was done which showed left frontal mass lesion with vasogenic edema and mass-effect. Dr. Weaver was consulted who recommended transfer to [facility] for further work-up. In the floor patient was noted with no complaints. She denies any fever, chills, nausea, vomiting, palpitations, syncope, abdominal pain bowel or bladder habit changes. She denies any intake of blood thinners, and only uses vitamin tablets.  Discussed with Dr. Weaver who recommended for MRI of brain, CT chest abdomen and pelvis to rule out primary.

## 2023-04-19 ENCOUNTER — APPOINTMENT (OUTPATIENT)
Dept: RADIOLOGY | Facility: MEDICAL CENTER | Age: 88
DRG: 064 | End: 2023-04-19
Attending: INTERNAL MEDICINE
Payer: MEDICARE

## 2023-04-19 ENCOUNTER — HOSPITAL ENCOUNTER (INPATIENT)
Facility: MEDICAL CENTER | Age: 88
LOS: 3 days | DRG: 064 | End: 2023-04-22
Attending: EMERGENCY MEDICINE | Admitting: INTERNAL MEDICINE
Payer: MEDICARE

## 2023-04-19 ENCOUNTER — APPOINTMENT (OUTPATIENT)
Dept: RADIOLOGY | Facility: MEDICAL CENTER | Age: 88
DRG: 064 | End: 2023-04-19
Attending: EMERGENCY MEDICINE
Payer: MEDICARE

## 2023-04-19 ENCOUNTER — HOSPITAL ENCOUNTER (OUTPATIENT)
Dept: RADIOLOGY | Facility: MEDICAL CENTER | Age: 88
End: 2023-04-19
Payer: MEDICARE

## 2023-04-19 DIAGNOSIS — I61.9 HEMORRHAGIC STROKE (HCC): ICD-10-CM

## 2023-04-19 DIAGNOSIS — I61.9 INTRAPARENCHYMAL HEMORRHAGE OF BRAIN (HCC): ICD-10-CM

## 2023-04-19 DIAGNOSIS — T14.8XXA ABRASION: ICD-10-CM

## 2023-04-19 PROBLEM — G93.89 FRONTAL MASS OF BRAIN: Status: RESOLVED | Noted: 2021-06-21 | Resolved: 2023-04-19

## 2023-04-19 PROBLEM — T14.90XA TRAUMA: Status: ACTIVE | Noted: 2023-04-19

## 2023-04-19 LAB
ABO + RH BLD: NORMAL
ABO GROUP BLD: NORMAL
ALBUMIN SERPL BCP-MCNC: 3.6 G/DL (ref 3.2–4.9)
ALBUMIN/GLOB SERPL: 1.2 G/DL
ALP SERPL-CCNC: 58 U/L (ref 30–99)
ALT SERPL-CCNC: 14 U/L (ref 2–50)
ANION GAP SERPL CALC-SCNC: 10 MMOL/L (ref 7–16)
APTT PPP: 25.6 SEC (ref 24.7–36)
AST SERPL-CCNC: 15 U/L (ref 12–45)
BILIRUB SERPL-MCNC: 0.9 MG/DL (ref 0.1–1.5)
BLD GP AB SCN SERPL QL: NORMAL
BUN SERPL-MCNC: 12 MG/DL (ref 8–22)
CALCIUM ALBUM COR SERPL-MCNC: 9.1 MG/DL (ref 8.5–10.5)
CALCIUM SERPL-MCNC: 8.8 MG/DL (ref 8.5–10.5)
CFT BLD TEG: 3.7 MIN (ref 4.6–9.1)
CFT P HPASE BLD TEG: 3.4 MIN (ref 4.3–8.3)
CHLORIDE SERPL-SCNC: 107 MMOL/L (ref 96–112)
CLOT ANGLE BLD TEG: 74.5 DEGREES (ref 63–78)
CLOT LYSIS 30M P MA LENFR BLD TEG: 1.1 % (ref 0–2.6)
CO2 SERPL-SCNC: 22 MMOL/L (ref 20–33)
CREAT SERPL-MCNC: 0.57 MG/DL (ref 0.5–1.4)
CT.EXTRINSIC BLD ROTEM: 1.3 MIN (ref 0.8–2.1)
ERYTHROCYTE [DISTWIDTH] IN BLOOD BY AUTOMATED COUNT: 43.9 FL (ref 35.9–50)
ETHANOL BLD-MCNC: <10.1 MG/DL
GFR SERPLBLD CREATININE-BSD FMLA CKD-EPI: 87 ML/MIN/1.73 M 2
GLOBULIN SER CALC-MCNC: 2.9 G/DL (ref 1.9–3.5)
GLUCOSE SERPL-MCNC: 96 MG/DL (ref 65–99)
HCG SERPL QL: NEGATIVE
HCT VFR BLD AUTO: 37.7 % (ref 37–47)
HGB BLD-MCNC: 13 G/DL (ref 12–16)
INR PPP: 0.98 (ref 0.87–1.13)
MCF BLD TEG: 58.5 MM (ref 52–69)
MCF.PLATELET INHIB BLD ROTEM: 19.6 MM (ref 15–32)
MCH RBC QN AUTO: 30.2 PG (ref 27–33)
MCHC RBC AUTO-ENTMCNC: 34.5 G/DL (ref 33.6–35)
MCV RBC AUTO: 87.5 FL (ref 81.4–97.8)
PA AA BLD-ACNC: 8.6 % (ref 0–11)
PA ADP BLD-ACNC: 19.3 % (ref 0–17)
PLATELET # BLD AUTO: 170 K/UL (ref 164–446)
PMV BLD AUTO: 9.6 FL (ref 9–12.9)
POTASSIUM SERPL-SCNC: 4 MMOL/L (ref 3.6–5.5)
PROT SERPL-MCNC: 6.5 G/DL (ref 6–8.2)
PROTHROMBIN TIME: 12.9 SEC (ref 12–14.6)
RBC # BLD AUTO: 4.31 M/UL (ref 4.2–5.4)
RH BLD: NORMAL
SODIUM SERPL-SCNC: 139 MMOL/L (ref 135–145)
TEG ALGORITHM TGALG: ABNORMAL
WBC # BLD AUTO: 7.2 K/UL (ref 4.8–10.8)

## 2023-04-19 PROCEDURE — 96374 THER/PROPH/DIAG INJ IV PUSH: CPT

## 2023-04-19 PROCEDURE — 86850 RBC ANTIBODY SCREEN: CPT

## 2023-04-19 PROCEDURE — 86900 BLOOD TYPING SEROLOGIC ABO: CPT

## 2023-04-19 PROCEDURE — 99291 CRITICAL CARE FIRST HOUR: CPT | Performed by: INTERNAL MEDICINE

## 2023-04-19 PROCEDURE — 85730 THROMBOPLASTIN TIME PARTIAL: CPT

## 2023-04-19 PROCEDURE — 84703 CHORIONIC GONADOTROPIN ASSAY: CPT

## 2023-04-19 PROCEDURE — G0390 TRAUMA RESPONS W/HOSP CRITI: HCPCS

## 2023-04-19 PROCEDURE — 80053 COMPREHEN METABOLIC PANEL: CPT | Mod: 91

## 2023-04-19 PROCEDURE — 770022 HCHG ROOM/CARE - ICU (200)

## 2023-04-19 PROCEDURE — 85610 PROTHROMBIN TIME: CPT

## 2023-04-19 PROCEDURE — 82077 ASSAY SPEC XCP UR&BREATH IA: CPT

## 2023-04-19 PROCEDURE — 36415 COLL VENOUS BLD VENIPUNCTURE: CPT

## 2023-04-19 PROCEDURE — 85347 COAGULATION TIME ACTIVATED: CPT

## 2023-04-19 PROCEDURE — 71045 X-RAY EXAM CHEST 1 VIEW: CPT

## 2023-04-19 PROCEDURE — 86901 BLOOD TYPING SEROLOGIC RH(D): CPT

## 2023-04-19 PROCEDURE — 73090 X-RAY EXAM OF FOREARM: CPT | Mod: LT

## 2023-04-19 PROCEDURE — 85027 COMPLETE CBC AUTOMATED: CPT

## 2023-04-19 PROCEDURE — 85384 FIBRINOGEN ACTIVITY: CPT

## 2023-04-19 PROCEDURE — 99291 CRITICAL CARE FIRST HOUR: CPT

## 2023-04-19 PROCEDURE — 85576 BLOOD PLATELET AGGREGATION: CPT | Mod: 91

## 2023-04-19 PROCEDURE — 700111 HCHG RX REV CODE 636 W/ 250 OVERRIDE (IP): Performed by: EMERGENCY MEDICINE

## 2023-04-19 RX ORDER — AMOXICILLIN 250 MG
1 CAPSULE ORAL
Status: DISCONTINUED | OUTPATIENT
Start: 2023-04-19 | End: 2023-04-19 | Stop reason: CLARIF

## 2023-04-19 RX ORDER — OXYCODONE HYDROCHLORIDE 5 MG/1
2.5 TABLET ORAL
Status: DISCONTINUED | OUTPATIENT
Start: 2023-04-19 | End: 2023-04-19 | Stop reason: CLARIF

## 2023-04-19 RX ORDER — ONDANSETRON 4 MG/1
4 TABLET, ORALLY DISINTEGRATING ORAL EVERY 4 HOURS PRN
Status: DISCONTINUED | OUTPATIENT
Start: 2023-04-19 | End: 2023-04-22 | Stop reason: HOSPADM

## 2023-04-19 RX ORDER — ONDANSETRON 4 MG/1
4 TABLET, ORALLY DISINTEGRATING ORAL EVERY 4 HOURS PRN
Status: DISCONTINUED | OUTPATIENT
Start: 2023-04-19 | End: 2023-04-19 | Stop reason: CLARIF

## 2023-04-19 RX ORDER — SIMVASTATIN 10 MG
10 TABLET ORAL EVERY EVENING
Status: DISCONTINUED | OUTPATIENT
Start: 2023-04-19 | End: 2023-04-19 | Stop reason: CLARIF

## 2023-04-19 RX ORDER — LEVETIRACETAM 500 MG/5ML
500 INJECTION, SOLUTION, CONCENTRATE INTRAVENOUS EVERY 12 HOURS
Status: DISCONTINUED | OUTPATIENT
Start: 2023-04-19 | End: 2023-04-19 | Stop reason: CLARIF

## 2023-04-19 RX ORDER — OXYCODONE HYDROCHLORIDE 5 MG/1
5 TABLET ORAL
Status: DISCONTINUED | OUTPATIENT
Start: 2023-04-19 | End: 2023-04-19 | Stop reason: CLARIF

## 2023-04-19 RX ORDER — FAMOTIDINE 20 MG/1
20 TABLET, FILM COATED ORAL 2 TIMES DAILY
Status: DISCONTINUED | OUTPATIENT
Start: 2023-04-19 | End: 2023-04-19 | Stop reason: CLARIF

## 2023-04-19 RX ORDER — ACETAMINOPHEN 325 MG/1
650 TABLET ORAL EVERY 4 HOURS PRN
Status: DISCONTINUED | OUTPATIENT
Start: 2023-04-19 | End: 2023-04-22 | Stop reason: HOSPADM

## 2023-04-19 RX ORDER — LEVETIRACETAM 500 MG/5ML
500 INJECTION, SOLUTION, CONCENTRATE INTRAVENOUS ONCE
Status: DISCONTINUED | OUTPATIENT
Start: 2023-04-19 | End: 2023-04-19

## 2023-04-19 RX ORDER — LEVETIRACETAM 500 MG/1
500 TABLET ORAL EVERY 12 HOURS
Status: DISCONTINUED | OUTPATIENT
Start: 2023-04-19 | End: 2023-04-19 | Stop reason: CLARIF

## 2023-04-19 RX ORDER — HYDROMORPHONE HYDROCHLORIDE 1 MG/ML
0.25 INJECTION, SOLUTION INTRAMUSCULAR; INTRAVENOUS; SUBCUTANEOUS
Status: DISCONTINUED | OUTPATIENT
Start: 2023-04-19 | End: 2023-04-19 | Stop reason: CLARIF

## 2023-04-19 RX ORDER — DOCUSATE SODIUM 100 MG/1
100 CAPSULE, LIQUID FILLED ORAL 2 TIMES DAILY
Status: DISCONTINUED | OUTPATIENT
Start: 2023-04-19 | End: 2023-04-19 | Stop reason: CLARIF

## 2023-04-19 RX ORDER — ONDANSETRON 2 MG/ML
4 INJECTION INTRAMUSCULAR; INTRAVENOUS EVERY 4 HOURS PRN
Status: DISCONTINUED | OUTPATIENT
Start: 2023-04-19 | End: 2023-04-22 | Stop reason: HOSPADM

## 2023-04-19 RX ORDER — ACETAMINOPHEN 325 MG/1
650 TABLET ORAL EVERY 6 HOURS
Status: DISCONTINUED | OUTPATIENT
Start: 2023-04-19 | End: 2023-04-19 | Stop reason: CLARIF

## 2023-04-19 RX ORDER — HYDRALAZINE HYDROCHLORIDE 20 MG/ML
10 INJECTION INTRAMUSCULAR; INTRAVENOUS EVERY 4 HOURS PRN
Status: DISCONTINUED | OUTPATIENT
Start: 2023-04-19 | End: 2023-04-22 | Stop reason: HOSPADM

## 2023-04-19 RX ORDER — SODIUM CHLORIDE 9 MG/ML
INJECTION, SOLUTION INTRAVENOUS CONTINUOUS
Status: DISCONTINUED | OUTPATIENT
Start: 2023-04-19 | End: 2023-04-19 | Stop reason: CLARIF

## 2023-04-19 RX ORDER — ACETAMINOPHEN 325 MG/1
650 TABLET ORAL EVERY 6 HOURS PRN
Status: DISCONTINUED | OUTPATIENT
Start: 2023-04-24 | End: 2023-04-19 | Stop reason: CLARIF

## 2023-04-19 RX ORDER — ENEMA 19; 7 G/133ML; G/133ML
1 ENEMA RECTAL
Status: DISCONTINUED | OUTPATIENT
Start: 2023-04-19 | End: 2023-04-19 | Stop reason: CLARIF

## 2023-04-19 RX ORDER — ENALAPRILAT 1.25 MG/ML
1.25 INJECTION INTRAVENOUS EVERY 6 HOURS PRN
Status: DISCONTINUED | OUTPATIENT
Start: 2023-04-19 | End: 2023-04-22 | Stop reason: HOSPADM

## 2023-04-19 RX ORDER — BISACODYL 10 MG
10 SUPPOSITORY, RECTAL RECTAL
Status: DISCONTINUED | OUTPATIENT
Start: 2023-04-19 | End: 2023-04-19 | Stop reason: CLARIF

## 2023-04-19 RX ORDER — ACETAMINOPHEN 650 MG/1
650 SUPPOSITORY RECTAL EVERY 4 HOURS PRN
Status: DISCONTINUED | OUTPATIENT
Start: 2023-04-19 | End: 2023-04-22 | Stop reason: HOSPADM

## 2023-04-19 RX ORDER — AMOXICILLIN 250 MG
1 CAPSULE ORAL NIGHTLY
Status: DISCONTINUED | OUTPATIENT
Start: 2023-04-19 | End: 2023-04-19 | Stop reason: CLARIF

## 2023-04-19 RX ORDER — ONDANSETRON 2 MG/ML
4 INJECTION INTRAMUSCULAR; INTRAVENOUS EVERY 4 HOURS PRN
Status: DISCONTINUED | OUTPATIENT
Start: 2023-04-19 | End: 2023-04-19 | Stop reason: CLARIF

## 2023-04-19 RX ORDER — LABETALOL HYDROCHLORIDE 5 MG/ML
10 INJECTION, SOLUTION INTRAVENOUS EVERY 4 HOURS PRN
Status: DISCONTINUED | OUTPATIENT
Start: 2023-04-19 | End: 2023-04-19 | Stop reason: CLARIF

## 2023-04-19 RX ORDER — LABETALOL HYDROCHLORIDE 5 MG/ML
10 INJECTION, SOLUTION INTRAVENOUS EVERY 4 HOURS PRN
Status: DISCONTINUED | OUTPATIENT
Start: 2023-04-19 | End: 2023-04-22 | Stop reason: HOSPADM

## 2023-04-19 RX ORDER — LEVETIRACETAM 500 MG/5ML
500 INJECTION, SOLUTION, CONCENTRATE INTRAVENOUS EVERY 12 HOURS
Status: DISCONTINUED | OUTPATIENT
Start: 2023-04-19 | End: 2023-04-21

## 2023-04-19 RX ORDER — POLYETHYLENE GLYCOL 3350 17 G/17G
1 POWDER, FOR SOLUTION ORAL 2 TIMES DAILY
Status: DISCONTINUED | OUTPATIENT
Start: 2023-04-19 | End: 2023-04-19 | Stop reason: CLARIF

## 2023-04-19 RX ADMIN — LEVETIRACETAM 500 MG: 100 INJECTION, SOLUTION INTRAVENOUS at 20:19

## 2023-04-19 ASSESSMENT — LIFESTYLE VARIABLES
TOTAL SCORE: 0
EVER HAD A DRINK FIRST THING IN THE MORNING TO STEADY YOUR NERVES TO GET RID OF A HANGOVER: NO
HOW MANY TIMES IN THE PAST YEAR HAVE YOU HAD 5 OR MORE DRINKS IN A DAY: 0
HAVE YOU EVER FELT YOU SHOULD CUT DOWN ON YOUR DRINKING: NO
ALCOHOL_USE: NO
ON A TYPICAL DAY WHEN YOU DRINK ALCOHOL HOW MANY DRINKS DO YOU HAVE: 0
DOES PATIENT WANT TO STOP DRINKING: CANNOT ASSESS
TOTAL SCORE: 0
HAVE PEOPLE ANNOYED YOU BY CRITICIZING YOUR DRINKING: NO
CONSUMPTION TOTAL: NEGATIVE
EVER FELT BAD OR GUILTY ABOUT YOUR DRINKING: NO
AVERAGE NUMBER OF DAYS PER WEEK YOU HAVE A DRINK CONTAINING ALCOHOL: 0
TOTAL SCORE: 0

## 2023-04-19 ASSESSMENT — COGNITIVE AND FUNCTIONAL STATUS - GENERAL
MOBILITY SCORE: 13
MOVING TO AND FROM BED TO CHAIR: A LITTLE
SUGGESTED CMS G CODE MODIFIER MOBILITY: CL
CLIMB 3 TO 5 STEPS WITH RAILING: TOTAL
MOVING FROM LYING ON BACK TO SITTING ON SIDE OF FLAT BED: A LOT
DAILY ACTIVITIY SCORE: 12
PERSONAL GROOMING: A LOT
SUGGESTED CMS G CODE MODIFIER DAILY ACTIVITY: CL
DRESSING REGULAR UPPER BODY CLOTHING: A LOT
HELP NEEDED FOR BATHING: A LOT
TOILETING: A LOT
DRESSING REGULAR LOWER BODY CLOTHING: A LOT
EATING MEALS: A LOT
STANDING UP FROM CHAIR USING ARMS: A LOT
WALKING IN HOSPITAL ROOM: TOTAL

## 2023-04-19 ASSESSMENT — COPD QUESTIONNAIRES
COPD SCREENING SCORE: 2
DO YOU EVER COUGH UP ANY MUCUS OR PHLEGM?: NO/ONLY WITH OCCASIONAL COLDS OR INFECTIONS
DURING THE PAST 4 WEEKS HOW MUCH DID YOU FEEL SHORT OF BREATH: NONE/LITTLE OF THE TIME
HAVE YOU SMOKED AT LEAST 100 CIGARETTES IN YOUR ENTIRE LIFE: NO/DON'T KNOW

## 2023-04-19 ASSESSMENT — FIBROSIS 4 INDEX
FIB4 SCORE: 2.08
FIB4 SCORE: 2.53
FIB4 SCORE: 2.08

## 2023-04-19 ASSESSMENT — PATIENT HEALTH QUESTIONNAIRE - PHQ9
SUM OF ALL RESPONSES TO PHQ9 QUESTIONS 1 AND 2: 0
1. LITTLE INTEREST OR PLEASURE IN DOING THINGS: NOT AT ALL

## 2023-04-19 ASSESSMENT — ENCOUNTER SYMPTOMS
FEVER: 0
COUGH: 0
VOMITING: 0
CHILLS: 0
NAUSEA: 0
WEAKNESS: 1
FOCAL WEAKNESS: 1

## 2023-04-20 ENCOUNTER — APPOINTMENT (OUTPATIENT)
Dept: RADIOLOGY | Facility: MEDICAL CENTER | Age: 88
DRG: 064 | End: 2023-04-20
Attending: INTERNAL MEDICINE
Payer: MEDICARE

## 2023-04-20 LAB
ALBUMIN SERPL BCP-MCNC: 3.6 G/DL (ref 3.2–4.9)
ALBUMIN/GLOB SERPL: 1.3 G/DL
ALP SERPL-CCNC: 55 U/L (ref 30–99)
ALT SERPL-CCNC: 15 U/L (ref 2–50)
ANION GAP SERPL CALC-SCNC: 12 MMOL/L (ref 7–16)
AST SERPL-CCNC: 18 U/L (ref 12–45)
BASOPHILS # BLD AUTO: 0.6 % (ref 0–1.8)
BASOPHILS # BLD: 0.04 K/UL (ref 0–0.12)
BILIRUB SERPL-MCNC: 1.1 MG/DL (ref 0.1–1.5)
BUN SERPL-MCNC: 12 MG/DL (ref 8–22)
CALCIUM ALBUM COR SERPL-MCNC: 8.9 MG/DL (ref 8.5–10.5)
CALCIUM SERPL-MCNC: 8.6 MG/DL (ref 8.5–10.5)
CHLORIDE SERPL-SCNC: 105 MMOL/L (ref 96–112)
CO2 SERPL-SCNC: 21 MMOL/L (ref 20–33)
CREAT SERPL-MCNC: 0.52 MG/DL (ref 0.5–1.4)
EOSINOPHIL # BLD AUTO: 0.02 K/UL (ref 0–0.51)
EOSINOPHIL NFR BLD: 0.3 % (ref 0–6.9)
ERYTHROCYTE [DISTWIDTH] IN BLOOD BY AUTOMATED COUNT: 43.3 FL (ref 35.9–50)
GFR SERPLBLD CREATININE-BSD FMLA CKD-EPI: 89 ML/MIN/1.73 M 2
GLOBULIN SER CALC-MCNC: 2.7 G/DL (ref 1.9–3.5)
GLUCOSE BLD STRIP.AUTO-MCNC: 89 MG/DL (ref 65–99)
GLUCOSE BLD STRIP.AUTO-MCNC: 90 MG/DL (ref 65–99)
GLUCOSE BLD STRIP.AUTO-MCNC: 91 MG/DL (ref 65–99)
GLUCOSE SERPL-MCNC: 95 MG/DL (ref 65–99)
HCT VFR BLD AUTO: 37.5 % (ref 37–47)
HGB BLD-MCNC: 13 G/DL (ref 12–16)
IMM GRANULOCYTES # BLD AUTO: 0.01 K/UL (ref 0–0.11)
IMM GRANULOCYTES NFR BLD AUTO: 0.2 % (ref 0–0.9)
LYMPHOCYTES # BLD AUTO: 1.28 K/UL (ref 1–4.8)
LYMPHOCYTES NFR BLD: 20.6 % (ref 22–41)
MCH RBC QN AUTO: 30.1 PG (ref 27–33)
MCHC RBC AUTO-ENTMCNC: 34.7 G/DL (ref 33.6–35)
MCV RBC AUTO: 86.8 FL (ref 81.4–97.8)
MONOCYTES # BLD AUTO: 0.67 K/UL (ref 0–0.85)
MONOCYTES NFR BLD AUTO: 10.8 % (ref 0–13.4)
NEUTROPHILS # BLD AUTO: 4.18 K/UL (ref 2–7.15)
NEUTROPHILS NFR BLD: 67.5 % (ref 44–72)
NRBC # BLD AUTO: 0 K/UL
NRBC BLD-RTO: 0 /100 WBC
PLATELET # BLD AUTO: 171 K/UL (ref 164–446)
PMV BLD AUTO: 9.6 FL (ref 9–12.9)
POTASSIUM SERPL-SCNC: 3.8 MMOL/L (ref 3.6–5.5)
PROT SERPL-MCNC: 6.3 G/DL (ref 6–8.2)
RBC # BLD AUTO: 4.32 M/UL (ref 4.2–5.4)
SODIUM SERPL-SCNC: 135 MMOL/L (ref 135–145)
SODIUM SERPL-SCNC: 137 MMOL/L (ref 135–145)
SODIUM SERPL-SCNC: 137 MMOL/L (ref 135–145)
SODIUM SERPL-SCNC: 138 MMOL/L (ref 135–145)
WBC # BLD AUTO: 6.2 K/UL (ref 4.8–10.8)

## 2023-04-20 PROCEDURE — 99233 SBSQ HOSP IP/OBS HIGH 50: CPT | Performed by: INTERNAL MEDICINE

## 2023-04-20 PROCEDURE — 99222 1ST HOSP IP/OBS MODERATE 55: CPT | Performed by: PHYSICAL MEDICINE & REHABILITATION

## 2023-04-20 PROCEDURE — 84295 ASSAY OF SERUM SODIUM: CPT

## 2023-04-20 PROCEDURE — 700111 HCHG RX REV CODE 636 W/ 250 OVERRIDE (IP): Performed by: INTERNAL MEDICINE

## 2023-04-20 PROCEDURE — 80053 COMPREHEN METABOLIC PANEL: CPT

## 2023-04-20 PROCEDURE — 92612 ENDOSCOPY SWALLOW (FEES) VID: CPT

## 2023-04-20 PROCEDURE — 85025 COMPLETE CBC W/AUTO DIFF WBC: CPT

## 2023-04-20 PROCEDURE — 92610 EVALUATE SWALLOWING FUNCTION: CPT

## 2023-04-20 PROCEDURE — 82962 GLUCOSE BLOOD TEST: CPT

## 2023-04-20 PROCEDURE — 70496 CT ANGIOGRAPHY HEAD: CPT

## 2023-04-20 PROCEDURE — 99223 1ST HOSP IP/OBS HIGH 75: CPT | Performed by: PSYCHIATRY & NEUROLOGY

## 2023-04-20 PROCEDURE — 700105 HCHG RX REV CODE 258: Performed by: INTERNAL MEDICINE

## 2023-04-20 PROCEDURE — 770022 HCHG ROOM/CARE - ICU (200)

## 2023-04-20 PROCEDURE — 700117 HCHG RX CONTRAST REV CODE 255: Performed by: INTERNAL MEDICINE

## 2023-04-20 PROCEDURE — 51798 US URINE CAPACITY MEASURE: CPT

## 2023-04-20 RX ORDER — SODIUM CHLORIDE 9 MG/ML
INJECTION, SOLUTION INTRAVENOUS CONTINUOUS
Status: DISCONTINUED | OUTPATIENT
Start: 2023-04-20 | End: 2023-04-21

## 2023-04-20 RX ADMIN — LEVETIRACETAM 500 MG: 100 INJECTION, SOLUTION INTRAVENOUS at 17:37

## 2023-04-20 RX ADMIN — LEVETIRACETAM 500 MG: 100 INJECTION, SOLUTION INTRAVENOUS at 05:04

## 2023-04-20 RX ADMIN — SODIUM CHLORIDE: 9 INJECTION, SOLUTION INTRAVENOUS at 11:11

## 2023-04-20 RX ADMIN — SODIUM CHLORIDE: 9 INJECTION, SOLUTION INTRAVENOUS at 23:43

## 2023-04-20 RX ADMIN — IOHEXOL 70 ML: 350 INJECTION, SOLUTION INTRAVENOUS at 01:18

## 2023-04-20 ASSESSMENT — PATIENT HEALTH QUESTIONNAIRE - PHQ9
SUM OF ALL RESPONSES TO PHQ9 QUESTIONS 1 AND 2: 0
2. FEELING DOWN, DEPRESSED, IRRITABLE, OR HOPELESS: NOT AT ALL
1. LITTLE INTEREST OR PLEASURE IN DOING THINGS: NOT AT ALL

## 2023-04-20 NOTE — CARE PLAN
The patient is Watcher - Medium risk of patient condition declining or worsening    Shift Goals  Clinical Goals: Q1H neuro assessments  Patient Goals: Sleep  Family Goals: Not present    Progress made toward(s) clinical / shift goals:  Neuro exam stable so far since admit    Patient is not progressing towards the following goals:      Problem: Knowledge Deficit - Standard  Goal: Patient and family/care givers will demonstrate understanding of plan of care, disease process/condition, diagnostic tests and medications  Outcome: Not Progressing  Note: Pt too acutely confused to comprehend her current situation     Problem: Knowledge Deficit - Stroke Education  Goal: Patient's knowledge of stroke and risk factors will improve  Outcome: Not Progressing  Note: Too acutely confused at this time for pt's knowledge to improve       Problem: Skin Integrity  Goal: Skin integrity is maintained or improved  Outcome: Progressing     Problem: Fall Risk  Goal: Patient will remain free from falls  Outcome: Progressing  Note: Bed alarm on, nonskid footwear on at all times, bed lowest position, 3 siderails up, pt positioned in line of sight of nurse's work station, re-oriented frequently.      Problem: Optimal Care of the Stroke Patient  Goal: Optimal emergency care for the stroke patient  Outcome: Progressing  Goal: Optimal acute care for the stroke patient  Outcome: Progressing  Flowsheets (Taken 4/19/2023 8969)  Acute Care Order Review: Reviewed orders for compliance with Stroke Protocol  Note: Q1H neuro checks performed. NIHSS with handoff. Plan for 6-hr CT this AM     Problem: Neuro Status  Goal: Neuro status will remain stable or improve  Outcome: Progressing     Problem: Respiratory - Stroke Patient  Goal: Patient will achieve/maintain optimum respiratory rate/effort  Outcome: Progressing     Problem: Risk for Aspiration  Goal: Patient's risk for aspiration will be absent or decrease  Note: Pt is NPO until SLP consult tomorrow

## 2023-04-20 NOTE — THERAPY
Occupational Therapy Contact Note    Patient Name: Farhan Tanner  Age:  88 y.o., Sex:  female  Medical Record #: [id number]  Today's Date: 4/20/2023 04/20/23 0823   Interdisciplinary Plan of Care Collaboration   Collaboration Comments OT referral received. Pt on bed rest. Will complete OT eval once pt cleared for OOB activity.

## 2023-04-20 NOTE — THERAPY
"Speech Language Pathology   Clinical Swallow Evaluation     Patient Name: Farhan Tanner  AGE:  88 y.o., SEX:  female  Medical Record #: [id number]  Date of Service: 4/20/2023      History of Present Illness    89 y/o admitted on 4/19 for head bleed. Not seen by SLP before at [facility].    Dx chest 4/19: \"No acute cardiopulmonary abnormality.\"  CTA of head 4/20: \"No large vessel occlusion or aneurysm identified. Right frontal parenchymal hemorrhage with surrounding vasogenic edema, stable. Left frontal parenchymal hemorrhage with surrounding vasogenic edema, stable. Right parietal subarachnoid hemorrhage, stable.\"      General Information:  Vitals  O2 (LPM): 0  O2 Delivery Device: None - Room Air  Patient Behaviors: Lethargic, Fatigue         Prior Living Situation & Level of Function:  Lives with - Patient's Self Care Capacity: Alone and Able to Care For Self  Communication: WNL  Swallowing: WNL       Oral Mechanism Evaluation:  Dentition: Good       Laryngeal Function:  Secretion Management: Adequate       Assessment  Current Method of Nutrition: NPO until cleared by speech pathology  Positioning: Salcedo's (60-90 degrees)  Bolus Administration: SLP, Patient  O2 (LPM): 0 O2 Delivery Device: None - Room Air  Factor(s) Affecting Performance: Impaired endurance, Impaired mental status         Swallowing Trials:  Swallowing Trials  Ice: Impaired  Thin Liquid (TN0): Impaired  Pureed (PU4): WFL      Comments: Immediate coughing x1 noted with trials of cup sips of thin liquids which may be concerning for penetration/aspiration. No overt s/sx of aspiration noted with trials of pudding and ice chips. Oral residue noted after the swallow with trials of pudding.      Clinical Impressions  Pt is presenting with s/sx concerning for aspiration given new bleed. Recommend NPO with FEES to objectively assess swallow function. Pt and her son verbalized consent for procedure.    Recommendations  Diet Consistency: " "liquidized/MTL  Instrumentation: FEES  Medication: Crush with applesauce, as appropriate  Supervision: 1:1 feeding with constant supervision  Positioning: Fully upright and midline during oral intake     Oral Care: Q2h         SLP Treatment Plan  Treatment Plan: Dysphagia Treatment  SLP Frequency: 4x Per Week  Estimated Duration: Until Therapy Goals Met      Anticipated Discharge Needs  Discharge Recommendations: Recommend post-acute placement for additional speech therapy services prior to discharge home   Therapy Recommendations Upon DC: Dysphagia Training, Community Re-Integration, Patient / Family / Caregiver Education        Patient / Family Goals  Patient / Family Goal #1: \"for her to get nutrition\" - pt's son  Short Term Goals  Short Term Goal # 1: Pt will consume an LQ3/MT2 diet with stratgies and no overt s/sx of aspiration      ALMA ROSA Pineda   "

## 2023-04-20 NOTE — ED NOTES
Med Rec complete per Pt's family at bedside.   Allergies reviewed.  Home Pharmacy:  [facility]

## 2023-04-20 NOTE — PROGRESS NOTES
Critical Care Progress Note    Date of admission  4/19/2023    Chief Complaint  Left sided weakness    Hospital Course  88 y.o. female who presented 4/19/2023 after being found down by family. Her son reports that he called her today and he could tell something was wrong and he went to see her. She had a left sided facial droop and her left upper extremity was weak. He said that she was able to speak, but her speech was slurred. He called EMS and she was brought to [facility] and CT head showed a right intraparenchymal hemorrhage and she was transferred to [facility] for neurosurgical care. Neurosurgery, Dr Hong, was called by the ERP who recommended keppra, BP<160, and CTA in 6 hours.      Patient was admitted from 6/21/21 to 6/23/21 with a left sided hemorrhagic lesion which appeared to be a mass, but after her MRI with stealth protocol, the mass was more likely to be a hematoma. She was followed with MRIs which showed resolution of the area.     Interval Problem Update  Reviewed last 24 hour events:              - acute events overnight              - Tm:37.5              - HR: 60's              - SBP: 120-140's              - Neuro: RASS-2              - GI: NPO              - UOP: 500ml              - Oro: No              - Lines: PIV              - PPx: No GI, No DVT indicated              - Mobility level 1    Review of Systems  Review of Systems   Unable to perform ROS: Medical condition      Vital Signs for last 24 hours   Temp:  [35.9 °C (96.6 °F)-37.5 °C (99.5 °F)] 36.8 °C (98.3 °F)  Pulse:  [65-82] 74  Resp:  [12-45] 20  BP: (113-156)/() 123/60  SpO2:  [91 %-97 %] 96 %    Hemodynamic parameters for last 24 hours       Respiratory Information for the last 24 hours       Physical Exam   Physical Exam  Constitutional:       General: She is not in acute distress.  HENT:      Mouth/Throat:      Mouth: Mucous membranes are moist.   Eyes:      Pupils: Pupils are equal, round, and reactive to light.    Cardiovascular:      Rate and Rhythm: Normal rate and regular rhythm.      Heart sounds: No murmur heard.    No friction rub. No gallop.   Pulmonary:      Effort: Pulmonary effort is normal. No respiratory distress.   Abdominal:      General: There is no distension.      Palpations: Abdomen is soft.   Musculoskeletal:      Right lower leg: No edema.      Left lower leg: No edema.   Skin:     General: Skin is warm and dry.   Psychiatric:      Comments: Unable to assess.       Medications  Current Facility-Administered Medications   Medication Dose Route Frequency Provider Last Rate Last Admin    NS infusion   Intravenous Continuous David Irwin M.D. 83 mL/hr at 04/20/23 1111 New Bag at 04/20/23 1111    levETIRAcetam (Keppra) injection 500 mg  500 mg Intravenous Q12HRS David Irwin M.D.   500 mg at 04/20/23 0504    Respiratory Therapy Consult   Nebulization Continuous RT YISEL Lund.O.        acetaminophen (Tylenol) tablet 650 mg  650 mg Oral Q4HRS PRN DAT LundO.        Or    acetaminophen (TYLENOL) suppository 650 mg  650 mg Rectal Q4HRS PRN DAT LundO.        ondansetron (ZOFRAN ODT) dispertab 4 mg  4 mg Oral Q4HRS PRN DAT LundO.        Or    ondansetron (ZOFRAN) syringe/vial injection 4 mg  4 mg Intravenous Q4HRS PRN Roberta Hernandez D.O.        MD Alert...ICU Electrolyte Replacement per Pharmacy   Other PHARMACY TO DOSE Roberta Hernandez D.O.        labetalol (NORMODYNE/TRANDATE) injection 10 mg  10 mg Intravenous Q4HRS PRN YISEL Lund.O.        hydrALAZINE (APRESOLINE) injection 10 mg  10 mg Intravenous Q4HRS PRN DAT LundO.        enalaprilat (Vasotec) injection 1.25 mg 1 mL  1.25 mg Intravenous Q6HRS PRN DAT LundO.           Fluids    Intake/Output Summary (Last 24 hours) at 4/20/2023 1328  Last data filed at 4/20/2023 1200  Gross per 24 hour   Intake 65.47 ml   Output 500 ml   Net -434.53 ml        Laboratory      Recent Labs     04/19/23 1641   CPKTOTAL 90     Recent Labs     04/19/23 1641 04/19/23 1943 04/20/23  0204 04/20/23  0810   SODIUM 135* 139 138 137   POTASSIUM 4.3 4.0 3.8  --    CHLORIDE 102 107 105  --    CO2 25 22 21  --    BUN 13 12 12  --    CREATININE 0.7 0.57 0.52  --    CALCIUM 9.0 8.8 8.6  --      Recent Labs     04/19/23 1641 04/19/23 1943 04/20/23  0204   ALTSGPT 20 14 15   ASTSGOT 22 15 18   ALKPHOSPHAT 63 58 55   TBILIRUBIN 0.9 0.9 1.1   LIPASE 41  --   --    GLUCOSE 106* 96 95     Recent Labs     04/19/23 1641 04/19/23 1943 04/20/23  0204   WBC 7.1 7.2 6.2   NEUTSPOLYS  --   --  67.50   LYMPHOCYTES  --   --  20.60*   MONOCYTES  --   --  10.80   EOSINOPHILS  --   --  0.30   BASOPHILS  --   --  0.60   ASTSGOT 22 15 18   ALTSGPT 20 14 15   ALKPHOSPHAT 63 58 55   TBILIRUBIN 0.9 0.9 1.1     Recent Labs     04/19/23 1641 04/19/23 1943 04/20/23  0204   RBC 4.36 4.31 4.32   HEMOGLOBIN 13.1 13.0 13.0   HEMATOCRIT 39.4 37.7 37.5   PLATELETCT 171 170 171   PROTHROMBTM  --  12.9  --    APTT  --  25.6  --    INR  --  0.98  --        Imaging  CT:    Reviewed    Assessment/Plan  * Intraparenchymal hemorrhage of brain (HCC)- (present on admission)  Assessment & Plan  Right sided intraparenchymal hemorrhage, no sign of head trauma, but was found on the floor   Concern for amyloid angiopathy as this is her second, non-hypertensive bleed     Serial neurologic exams  Repeat CT with angio around 0100   Follow up MRI pending  BP goal <160   Neurology consulted.    Trauma- (present on admission)  Assessment & Plan  Reported pain in her left arm XR negative          VTE:  Contraindicated  Ulcer: Not Indicated  Lines: None    I have performed a physical exam and reviewed and updated ROS and Plan today (4/20/2023). In review of yesterday's note (4/19/2023), there are no changes except as documented above.     Discussed patient condition and risk of morbidity and/or mortality with Family, RN,  Pharmacy, and neurology    This patient remains at high risk for worsening central nervous system dysfunction, requiring frequent neurological assessment and strict blood pressure control.  I have assessed and reassessed the neurologic exam, blood pressure, and hemodynamics

## 2023-04-20 NOTE — CONSULTS
Neurology initial consultation note  Neurohospitalist Service, [facility]    Referring Physician: David Irwin M.D.    STROKE:   Chief Complaint   Patient presents with    Trauma Green         HPI: Farhan Tanner is a 88 y.o. right-handed female with past medical history significant for intraparenchymal hemorrhage 2 years ago with no significant residual symptom who was transferred from [facility] after she was found to have intraparenchymal hemorrhage and CT.  Her son tells me, he talk to her on Tuesday night and she was fine and subsequently the next day on Wednesday she called and reported she does not feel well and she cannot move her left side.  Her son found her on the floor with profound left-sided weakness and left facial droop and called 911.  She is not on any blood thinner.  She denies having any headache and she was not profoundly hypertensive.    Review of systems: In addition to what is detailed in the HPI above, all other systems reviewed and are negative.    Past Medical History:    has no past medical history on file.    FHx:  family history includes Cancer in her brother, father, sister, and son; Heart Disease in her father; Hypertension in her mother; Other in her sister.    SHx:   reports that she quit smoking about 56 years ago. Her smoking use included cigarettes. She has a 6.00 pack-year smoking history. She has never used smokeless tobacco. She reports that she does not drink alcohol and does not use drugs.    Allergies:  No Known Allergies    Medications:    Current Facility-Administered Medications:     levETIRAcetam (Keppra) injection 500 mg, 500 mg, Intravenous, Q12HRS, YISEL Lund.O., 500 mg at 04/20/23 0504    Respiratory Therapy Consult, , Nebulization, Continuous RT, Roberta Hernandez D.O.    acetaminophen (Tylenol) tablet 650 mg, 650 mg, Oral, Q4HRS PRN **OR** acetaminophen (TYLENOL) suppository 650 mg, 650 mg, Rectal, Q4HRS  PRN, Roberta Hernandez D.O.    ondansetron (ZOFRAN ODT) dispertab 4 mg, 4 mg, Oral, Q4HRS PRN **OR** ondansetron (ZOFRAN) syringe/vial injection 4 mg, 4 mg, Intravenous, Q4HRS PRN, Roberta Hernandez D.O. MD Alert...ICU Electrolyte Replacement per Pharmacy, , Other, PHARMACY TO DOSE, Roberta Hernandez D.O.    labetalol (NORMODYNE/TRANDATE) injection 10 mg, 10 mg, Intravenous, Q4HRS PRN, Roberta Hernandez D.O.    hydrALAZINE (APRESOLINE) injection 10 mg, 10 mg, Intravenous, Q4HRS PRN, Roberta Hernandez D.O.    enalaprilat (Vasotec) injection 1.25 mg 1 mL, 1.25 mg, Intravenous, Q6HRS PRN, Roberta Hernandez D.O.    Physical Examination:    Vitals:    04/20/23 0400 04/20/23 0500 04/20/23 0600 04/20/23 0700   BP: 113/83 123/84 132/71 129/80   Pulse: 65 69 67 67   Resp: 15 14 12 14   Temp: 37.5 °C (99.5 °F)      TempSrc: Temporal      SpO2: 95% 95% 94% 95%   Weight:       Height:           General:   Patient is awake and in no acute distress  Neck: Full range of motion  Eyes: Midline, Pupils reactive to light.  CV: RRR  Lungs: No respiratory distress  Extremities: No cyanosis, warm, no significant edema.    NEUROLOGICAL EXAM:   Mental status: Awake, alert and fully oriented, follows commands  Speech and language: speech is fluent and not dysarthric. The patient is able to name and repeat.  Cranial nerve exam: Pupils are equal, round and reactive to light bilaterally. Visual fields are full. Extraocular muscles are intact. Sensation in the face is intact to light touch. Face with mild left facial weakness in an upper motor neuron pattern.  Hearing is decreased to finger rub bilaterally. Palate elevates symmetrically. Shoulder shrug is full. Tongue is midline.  Motor exam: Sustain antigravity in all 4 extremities with downward drift in left upper extremity. Tone is normal. No abnormal movements were seen on exam.  Sensory exam: She has decreased sensation in left upper and lower extremity and has normal  sensation in right upper and lower extremity.  Coordination: no gross ataxia noted on exam  Plantar reflexes: Equivocal  Gait: deferred     NIH Stroke Scale:    1a. Level of Consciousness (Alert, drowsy, etc): 0= Alert    1b. LOC Questions (Month, age): 0= Answers both correctly    1c. LOC Commands (Open/close eyes make fist/let go): 0= Obeys both correctly    2.   Best Gaze (Eyes open - patient follows examiner's finger on face): 1= Partial gaze palay    3.   Visual Fields (introduce visual stimulus/threat to patient's field quadrants): 0= No visual loss  4.   Facial Paresis (Show teeth, raise eyebrows and squeeze eyes shut): 1= Minor     5a. Motor Arm - Left (Elevate arm to 90 degrees if patient is sitting, 45 degrees if  supine): 1= Drift    5b. Motor Arm - Right (Elevate arm to 90 degrees if patient is sitting, 45 degrees if supine): 0= No drift    6a. Motor Leg - Left (Elevate leg 30 degrees with patient supine): 0= No drift    6b. Motor Leg - Right  (Elevate leg 30 degrees with patient supine): 0= No drift    7.   Limb Ataxia (Finger-nose, heel down shin): 0= No ataxia    8.   Sensory (Pin prick to face, arm, trunk and leg - compare side to side): 1= Partial loss    9.  Best Language (Name item, describe a picture and read sentences): 0= No aphasia    10. Dysarthria (Evaluate speech clarity by patient repeating listed words): 0= Normal articulation    11. Extinction and Inattention (Use information from prior testing to identify neglect or  double simultaneous stimuli testing): 1= Partial neglect    Total NIH Score: 5    Baseline modified Stanton Scale (MRS): 0 = No symptoms    INTRACEREBRAL HEMORRHAGE SCORE:    ICH SCORE:  Nathen Coma Score:  15  Age:  88  ICH Volume (using ABC/2 Formula) greater than 30cc = yes  Intraventricular Hemorrhage:  No  Infratentorial Origin of Hemorrhage:  No  ICH TOTAL SCORE:   2    Objective Data:    Labs:  Lab Results   Component Value Date/Time    PROTHROMBTM 12.9 04/19/2023  07:43 PM    INR 0.98 04/19/2023 07:43 PM      Lab Results   Component Value Date/Time    WBC 6.2 04/20/2023 02:04 AM    RBC 4.32 04/20/2023 02:04 AM    HEMOGLOBIN 13.0 04/20/2023 02:04 AM    HEMATOCRIT 37.5 04/20/2023 02:04 AM    MCV 86.8 04/20/2023 02:04 AM    MCH 30.1 04/20/2023 02:04 AM    MCHC 34.7 04/20/2023 02:04 AM    MPV 9.6 04/20/2023 02:04 AM    NEUTSPOLYS 67.50 04/20/2023 02:04 AM    LYMPHOCYTES 20.60 (L) 04/20/2023 02:04 AM    MONOCYTES 10.80 04/20/2023 02:04 AM    EOSINOPHILS 0.30 04/20/2023 02:04 AM    BASOPHILS 0.60 04/20/2023 02:04 AM      Lab Results   Component Value Date/Time    SODIUM 138 04/20/2023 02:04 AM    POTASSIUM 3.8 04/20/2023 02:04 AM    CHLORIDE 105 04/20/2023 02:04 AM    CO2 21 04/20/2023 02:04 AM    GLUCOSE 95 04/20/2023 02:04 AM    BUN 12 04/20/2023 02:04 AM    CREATININE 0.52 04/20/2023 02:04 AM    GLOMRATE 93 12/24/2021 04:08 PM      Lab Results   Component Value Date/Time    CHOLSTRLTOT 215 (H) 01/17/2023 12:45 PM     (H) 01/17/2023 12:45 PM    HDL 98.0 (H) 01/17/2023 12:45 PM    TRIGLYCERIDE 67 01/17/2023 12:45 PM       Lab Results   Component Value Date/Time    ALKPHOSPHAT 55 04/20/2023 02:04 AM    ASTSGOT 18 04/20/2023 02:04 AM    ALTSGPT 15 04/20/2023 02:04 AM    TBILIRUBIN 1.1 04/20/2023 02:04 AM        Imaging/Testing:    I interpreted and/or reviewed the patient's neuroimaging    CT-CTA HEAD WITH & W/O-POST PROCESS   Final Result         1.  No large vessel occlusion or aneurysm identified.   2.  Right frontal parenchymal hemorrhage with surrounding vasogenic edema, stable.   3.  Left frontal parenchymal hemorrhage with surrounding vasogenic edema, stable.   4.  Right parietal subarachnoid hemorrhage, stable.      DX-FOREARM LEFT   Final Result      No acute osseous abnormality.      DX-CHEST-LIMITED (1 VIEW)   Final Result      No acute cardiopulmonary abnormality.      OUTSIDE IMAGES-CT CERVICAL SPINE   Final Result      OUTSIDE IMAGES-CT HEAD   Final Result           Assessment:  Farhan Tanner is a 88 y.o. who was transferred from [facility] after she was found to have intraparenchymal hemorrhage in right frontal parietal head region as well as left frontal head region.  The etiology of her hemorrhage is likely amyloid angiopathy and less likely hypertensive.  She has had previous intraparenchymal hemorrhage 2 years ago as well which again is more consistent with amyloid angiopathy.  There is a small left frontoparietal subdural hematoma as well that is likely traumatic from her fall.  Her repeat brain CT is a stable with evidence of right parietal subarachnoid hemorrhage.    Plan:  -Admit to ICU for close neuro monitoring  -q1h and PRN neuro assessment. VS per nursing/unit protocol.   -No known underlying coagulopathy, maintain INR < 1.5, platelets > 100 K  -Maintain systolic blood pressure less than 140, antihypertensives per primary team.  Cardene drip is preferred.  -Repeat brain CT in 6 hours is stable.  -Patient was seen by neurosurgery and no neurosurgical intervention deemed necessary at this time.  -Obtain MRI Brain w/wo contrast to rule out underlying lesion. MRA head without, ordered.  -No antiplatelet or anticoagulant.  -Keep head of bed elevated at 30 degrees.  -Maintain bowel regimen to avoid Valsalva and increase intracranial pressure.  -Recommend aggressive BG management per primary team. Avoid IVF with Dextrose. -BG goal .   -Maintain normothermia, eunatremia and normoglycemia.  -Given multiple cortical hemorrhages and increased risk of seizure, I think is reasonable to continue with Keppra 500 twice daily for seizure prevention.  -PT/OT/SLP eval and treat for early mobilization if blood pressure remains a stable.  -All other medical management per primary team.   -DVT PPX: SCDs.      The plan of care above has been discussed with Dr. Serjio White in emergency room.      Please note that this dictation was created using voice  recognition software. I have made every reasonable attempt to correct obvious errors, but I expect that there are errors of grammar and possibly content that I did not discover before finalizing the note.       Miguel Angel San MD  Acute [facility] Neurology Services

## 2023-04-20 NOTE — ED TRIAGE NOTES
Chief Complaint   Patient presents with    Trauma Green      Pt BIB EMS as a Trauma Green Transfer from [facility]. Pt was found down by family. LKW 1600 yesterday. Per family pt was altered and had slurred speech at the time, with left sided deficits. Pt has abrasions/skin tears to the L hand/ and bruising. Pt is not on blood thinners. Pt transferred for a Big 3, Right IP Bleed. Pt havig difficulty following commands on the left upper extremity. No slurred speech noted at this time. L sided facial contusion noted. A/O x 4, GCS 15.

## 2023-04-20 NOTE — THERAPY
Physical Therapy Contact Note    Patient Name: Farhan Tanner  Age:  88 y.o., Sex:  female  Medical Record #: [id number]  Today's Date: 4/20/2023    PT consult received, new admit with bedrest orders; please remove as appropriate for PT evaluation;    Kasie BAL, PT,  [phone]

## 2023-04-20 NOTE — DISCHARGE PLANNING
RNCM spoke to pt's sonDevin to inquire assessment information. Devin verified demographics. Devin stated pt has a lot of family support (two sons, and a daughter) which one will always be at bedside with Tobianne. Devin states pt lives in [place] in a 1 story house with one step to threshold. Pt does not use any DME at home. Devin stated him or his siblings will be able to transport Tobianne home upon discharge if that us the plan.     Care Transition Team Assessment    Information Source  Orientation Level: Oriented X4  Information Given By:  (Son)  Informant's Name: Devin Colindres  Who is responsible for making decisions for patient? : Patient    Readmission Evaluation  Is this a readmission?: No    Elopement Risk  Legal Hold: No  Ambulatory or Self Mobile in Wheelchair: No-Not an Elopement Risk  Elopement Risk: Not at Risk for Elopement    Interdisciplinary Discharge Planning  Does Admitting Nurse Feel This Could be a Complex Discharge?: No  Primary Care Physician: Dr. Kirk  Lives with - Patient's Self Care Capacity: Alone and Able to Care For Self  Support Systems: Children, Family Member(s)  Housing / Facility: 1 Story House (1 step to threshold)  Prior Services: None, Home-Independent  Patient Prefers to be Discharged to:: home  Assistance Needed: Yes  Durable Medical Equipment: Not Applicable    Discharge Preparedness  What is your plan after discharge?: Uncertain - pending medical team collaboration  What are your discharge supports?:  (adult children)  Prior Functional Level: Ambulatory, Independent with Activities of Daily Living, Independent with Medication Management  Difficulity with ADLs: None  Difficulity with IADLs: None    Functional Assesment  Prior Functional Level: Ambulatory, Independent with Activities of Daily Living, Independent with Medication Management    Finances  Financial Barriers to Discharge: No    Vision / Hearing Impairment  Vision Impairment : Yes  Left Eye Vision: Impaired, Other  (Comments) (difficult to open eye r/t facial droop)  Hearing Impairment : No    Advance Directive  Advance Directive?: None    Domestic Abuse  Have you ever been the victim of abuse or violence?: No  Physical Abuse or Sexual Abuse: No  Verbal Abuse or Emotional Abuse: No  Possible Abuse/Neglect Reported to:: Not Applicable    Psychological Assessment  History of Substance Abuse: None  History of Psychiatric Problems: No    Discharge Risks or Barriers  Discharge risks or barriers?: Complex medical needs  Patient risk factors: Complex medical needs    Anticipated Discharge Information  Discharge Disposition: Disch to IP rehab facility or distinct part unit (62)

## 2023-04-20 NOTE — THERAPY
"Speech Language Pathology   Flexible Endoscopic Evaluation of Swallowing (FEES)        Patient Name: Farhan Tanner  AGE:  88 y.o., SEX:  female  Medical Record #: [id number]  Date of Service: 4/20/2023      History of Present Illness    89 y/o admitted on 4/19 for head bleed. Not seen by SLP before at [facility].    Dx chest 4/19: \"No acute cardiopulmonary abnormality.\"  CTA of head 4/20: \"No large vessel occlusion or aneurysm identified. Right frontal parenchymal hemorrhage with surrounding vasogenic edema, stable. Left frontal parenchymal hemorrhage with surrounding vasogenic edema, stable. Right parietal subarachnoid hemorrhage, stable.\"      Pertinent Information  Current Method of Nutrition: NPO until cleared by speech pathology  Patient Behaviors: Lethargic, Fatigue   Dentition: Good   Feeding Tube: None     Discussed the risks, benefits, and alternatives of the FEES procedure. Patient/family acknowledged and agreed to proceed.      Assessment  Flexible Endoscopic Evaluation of Swallowing (FEES) completed at bedside today. The endoscope was passed transnasally via Left nare to evaluate the anatomy and physiology of swallowing. Pt tolerated the procedure with no apparent distress.    Anatomic Findings: (space occupying lesion on R vallecular space)      Vocal Fold Motion: Bilateral movement  Secretion Management: Adequate  PO Trials: Thin Liquid, Mildly Thick Liquid, Liquidised, Pudding, Soft & Bite Sized      Consistency PAS Score Timing Residue Comments   Thin Liquid 8 Pre Swallow, During swallow Vallecular Residue: Mild (5%-25%)  Pyriform Sinus Residue: Mild (5%-25%)    Mildly Thick 1 N/A Vallecular Residue: Mild (5%-25%)  Pyriform Sinus Residue: None (0%)    Liquidised 1 N/A Vallecular Residue: Moderate (25%-50%) (mod - severe x1, min x1)  Pyriform Sinus Residue: Mild (5%-25%)    Pudding 1 N/A Vallecular Residue: Severe (>50%)  Pyriform Sinus Residue: Mild (5%-25%) (Left)    Soft & Bite Sized 1 N/A " Vallecular Residue: Severe (>50%)  Pyriform Sinus Residue: Trace (1%-5%)      Penetration-Aspiration Scale (PAS)  1     No contrast enters airway  2     Contrast enters the airway, remains above the vocal folds, and is ejected from the airway (not seen in the airway at the end of the swallow).  3     Contrast enters the airway, remains above the vocal folds, and is not ejected from the airway (is seen in the airway after the swallow).  4     Contrast enters the airway, contacts the vocal folds, and is ejected from the airway.  5     Contrast enters the airway, contacts the vocal folds, and is not ejected from the airway  6     Contrast enters the airway, crosses the plane of the vocal folds, and is ejected from the airway.  7     Contrast enters the airway, crosses the plane of the vocal folds, and is not ejected from the airway despite effort.  8     Contrast enters the airway, crosses the plane of the vocal folds, is not ejected from the airway and there is no response to aspiration.      Oral phase:  Oral holding which may be attributed to lethargy. Impaired mastication and severe oral residue in L cheek with trials of soft solids. Pt had difficulty moving bolus through straw d/t L sided weakness.    Pharyngeal phase:    Weak pharyngeal squeeze, impaired laryngeal vestibule/laryngeal elevation, impaired sensation, impaired base of tongue retraction, and impaired pharyngeal shortening resulted in:  Silent aspiration during the swallow with teaspoon sips of thin liquids and deep penetration to VF (cannot r/o microaspiration) during the swallow as noted by bolus moving over tip of epiglottis. With cup and straw sips residue remained in laryngeal vestibule after the swallow which did not clear with cued cough. Cued cough was successful for ejection of aspirate.  Moderate - severe residue x1 with apple sauce but severe vallecular residue with pudding. A second swallow helped to improve vallecular residue of apple  "sauce but was not beneficial to reduce residue with pudding.  Severe residue in vallecular space after trials of soft solids which took 5 swallows to clear (including two MTL washes).    Lethargy impacted pt's swallow function as she required mod cues to maintain wakefulness    Compensatory Strategies:  Please see above for strategies with specific textures    Severity Rating:  Severity Rating: LUISA  LUISA: Moderate      Clinical Impressions  The pt presents with a moderate oropharyngeal dysphagia. This is acute change from baseline s/p R hemorrhage. Recommend careful initiation of a liquidized/MTL diet with compensatory strategies.       Recommendations  Diet Consistency: liquidized/MTL  Medication: Crush with applesauce, as appropriate  Supervision: 1:1 feeding with constant supervision  Positioning: Fully upright and midline during oral intake  Strategies: Small bites/sips, Alternate bites and sips, Multiple swallows (2-3) per bite/sips  Oral Care: Q2h  Additional Instrumentation: Repeat diagnostic study when clinically appropriate         SLP Treatment Plan  Treatment Plan: Dysphagia Treatment  SLP Frequency: 4x Per Week  Estimated Duration: Until Therapy Goals Met      Anticipated Discharge Needs  Discharge Recommendations: Recommend post-acute placement for additional speech therapy services prior to discharge home   Therapy Recommendations Upon DC: Dysphagia Training, Community Re-Integration, Patient / Family / Caregiver Education       Patient / Family Goals  Patient / Family Goal #1: \"for her to get nutrition\" - pt's son  Short Term Goal # 1: Pt will consume an LQ3/MT2 diet with stratgies and no overt s/sx of aspiration      Ivette Mcfarlane, SLP  "

## 2023-04-20 NOTE — CARE PLAN
The patient is Watcher - Medium risk of patient condition declining or worsening    Shift Goals  Clinical Goals: Q1H Neuro assessments, Q6H Sodium  Patient Goals: Rest  Family Goals: Updates    Progress made toward(s) clinical / shift goals:  Q2H Neuro assessments stable. Pt is Aox4 and has improving strength 4/5 in LLE. Mobility in L arm improving with verbal prompting. 3/5 strength in LUE. Pt still has decreased sensation on L side with L side neglect. FEES performed by SLP with goal of advancing diet as pt is NPO. NS started at 83mL/Hr for hydration. Bladder scanning pt for urinary retention. Oro will be placed for >200mL of urine in bladder.

## 2023-04-20 NOTE — ED NOTES
Chief Complaint   Patient presents with    Trauma Green      Pt BIB EMS as a Trauma Green Transfer from [facility]. Pt was found down by family. LKW 1600 yesterday. Per family pt was altered and had slurred speech at the time, with left sided deficits. Pt has abrasions/skin tears to the L hand/ and bruising. Pt is not on blood thinners. Pt transferred for a Big 3, Right IP Bleed. Pt havig difficulty following commands on the left upper extremity. No slurred speech noted at this time. L sided facial contusion noted. A/O x 4, GCS 15.

## 2023-04-20 NOTE — PROGRESS NOTES
Neurosurgery Progress Note    Subjective:  This is an 88-year-old right-handed woman who was found down.  She states she is unclear on the overall events.  She had weakness on the left side.  She was taken to [facility] and a CAT scan there showed a 5x4 cm right intraparenchymal hematoma on the periphery as well as a small left frontal lesion and hyperdensity much smaller. There is mild midline shift regarding that.  She was transferred to [facility].  A CTA was negative for any further vascular etiology identification and also stable scans.  She has been stable since arrival.  Sleepy, but arousable.    No events overnight  Left hemiparesis improving    Head CTA  reviewed by Dr. Hong, stable    Exam:  Pleasant and cooperative, drowsy  A&Ox3  Slight right gaze and left facial droop  Pupils 2-3mm symmetric and reactive  Following commands briskly  Left upper and lower extremity strength 4/5 throughout with decreased sensation  Intact on right      BP  Min: 113/83  Max: 156/78  Pulse  Av.3  Min: 65  Max: 82  Resp  Av.7  Min: 12  Max: 45  Temp  Av.9 °C (98.4 °F)  Min: 35.9 °C (96.6 °F)  Max: 37.5 °C (99.5 °F)  SpO2  Av.7 %  Min: 91 %  Max: 97 %    No data recorded    Recent Labs     23  0204   WBC 7.1 7.2 6.2   RBC 4.36 4.31 4.32   HEMOGLOBIN 13.1 13.0 13.0   HEMATOCRIT 39.4 37.7 37.5   MCV 90.4 87.5 86.8   MCH 30.0 30.2 30.1   MCHC 33.2 34.5 34.7   RDW 13.7 43.9 43.3   PLATELETCT 171 170 171   MPV 9.8 9.6 9.6     Recent Labs     23  0204 23  0810   SODIUM 135* 139 138 137   POTASSIUM 4.3 4.0 3.8  --    CHLORIDE 102 107 105  --    CO2 25 22 21  --    GLUCOSE 106* 96 95  --    BUN 13 12 12  --    CREATININE 0.7 0.57 0.52  --    CALCIUM 9.0 8.8 8.6  --      Recent Labs     23   APTT 25.6   INR 0.98     Recent Labs     23   REACTMIN 3.7*   CLOTKINET 1.3   CLOTANGL 74.5   MAXCLOTS 58.5    TEB74BWV 1.1   PRCINADP 19.3*   PRCINAA 8.6       Intake/Output                         04/19/23 0700 - 04/20/23 0659 04/20/23 0700 - 04/21/23 0659     0700-1859 1900-0659 Total 0700-1859 1900-0659 Total                 Intake    I.V.  --  0 0  --  -- --    Pre-Hospital Volume -- 0 0 -- -- --    Trauma Resuscitation Volume -- 0 0 -- -- --    Volume (mL) (NS infusion) -- 0 0 -- -- --    Blood  --  0 0  --  -- --    PRBC Total Volume (Non-Barcoded) -- 0 0 -- -- --    FFP Total Volume (Non-Barcoded) -- 0 0 -- -- --    Platelets Total Volume (Non-Barcoded) -- 0 0 -- -- --    Cryoprecipitate (Pooled) Total Volume (Non-Barcoded) -- 0 0 -- -- --    Total Intake -- 0 0 -- -- --       Output    Urine  --  500 500  --  -- --    Straight Catheter -- 500 500 -- -- --    Other  --  0 0  --  -- --    Pre-Hospital Output -- 0 0 -- -- --    Trauma Resuscitation Output -- 0 0 -- -- --    Stool  --  -- --  --  -- --    Number of Times Stooled -- -- -- 0 x -- 0 x    Blood  --  0 0  --  -- --    Est. Blood Loss -- 0 0 -- -- --    Total Output -- 500 500 -- -- --       Net I/O     -- -500 -500 -- -- --              Intake/Output Summary (Last 24 hours) at 4/20/2023 1042  Last data filed at 4/20/2023 0510  Gross per 24 hour   Intake 0 ml   Output 500 ml   Net -500 ml       $ Bladder Scan Results (mL): 100     levETIRAcetam (Keppra) IV  500 mg Q12HRS    Respiratory Therapy Consult   Continuous RT    acetaminophen  650 mg Q4HRS PRN    Or    acetaminophen  650 mg Q4HRS PRN    ondansetron  4 mg Q4HRS PRN    Or    ondansetron  4 mg Q4HRS PRN    MD Alert...Adult ICU Electrolyte Replacement per Pharmacy   PHARMACY TO DOSE    labetalol  10 mg Q4HRS PRN    hydrALAZINE  10 mg Q4HRS PRN    enalaprilat  1.25 mg Q6HRS PRN       Assessment and Plan:  Hospital day # 2  Q 2 hr neuro checks  Keep systolic BP <160  Keppra 500mg BID x 7days  Brain MRI pending  No NS planned at this time  NS following    Chemical prophylactic DVT therapy: No  Start  date/time: TBD

## 2023-04-20 NOTE — ED PROVIDER NOTES
ED Provider Note    CHIEF COMPLAINT  Chief Complaint   Patient presents with    Trauma Green       EXTERNAL RECORDS REVIEWED  External ED Note [facility] CT that shows intraparenchymal hemorrhage,    HPI/ROS  LIMITATION TO HISTORY   Select: : None  OUTSIDE HISTORIAN(S):  EMS no changes status in route    Farhan Tanner is a 88 y.o. female who presents as a transfer from [place] with intraparenchymal hemorrhage.  Patient does not remember what happens, she was found by family down between her bed and the floor earlier today.  Last known normal yesterday afternoon around 4:00 when they spoke with her on the phone.  She does not remember what happened or what she had for dinner or for breakfast.  She reports no complaints, no headaches, no focal weakness numbness or tingling, no nausea or vomiting.  No recent illness fevers chills cough or congestion    Per reports the family did find her bathroom sink with water and blood about it as if she had fallen was trying to clean herself up again although she does not remember this.    She does not take any aspirin or blood thinners    PAST MEDICAL HISTORY       SURGICAL HISTORY   has a past surgical history that includes eye surgery ().    FAMILY HISTORY  Family History   Problem Relation Age of Onset    Hypertension Mother     Cancer Father     Heart Disease Father     Other Sister         myasthenia gravis    Cancer Brother     Cancer Sister         pancreatic    Cancer Son         melanoma       SOCIAL HISTORY  Social History     Tobacco Use    Smoking status: Former     Packs/day: 0.50     Years: 12.00     Pack years: 6.00     Types: Cigarettes     Quit date: 1966     Years since quittin.6    Smokeless tobacco: Never   Vaping Use    Vaping Use: Never used   Substance and Sexual Activity    Alcohol use: No    Drug use: No    Sexual activity: Not Currently     Comment: ; one sis w/ Myasthenia Gravis and one son w/ Melanoma  "      CURRENT MEDICATIONS  Home Medications       Reviewed by Suzy Bautista R.N. (Registered Nurse) on 04/19/23 at 1923  Med List Status: Partial     Medication Last Dose Status   Coenzyme Q10 60 MG Cap  Active   cyanocobalamin (VITAMIN B-12) 100 MCG Tab  Active   Lutein 20 MG Cap  Active   MAGNESIUM PO  Active   Omega-3 Fatty Acids (FISH OIL) 1000 MG Cap capsule  Active   simvastatin (ZOCOR) 10 MG Tab  Active   TURMERIC PO  Active                    ALLERGIES  No Known Allergies    PHYSICAL EXAM  VITAL SIGNS: /74   Pulse 74   Temp 35.9 °C (96.6 °F) (Temporal)   Resp 16   Ht 1.6 m (5' 3\")   Wt 61.2 kg (135 lb)   SpO2 96%   BMI 23.91 kg/m²      Pulse ox interpretation: I interpret this pulse ox as normal.  Constitutional: Alert in no apparent distress.  HENT: There is a contusion over the left face, bilateral external ears normal, Nose normal.   Eyes: Pupils are equal and reactive, Conjunctiva normal, Non-icteric.   Neck: Normal range of motion, No tenderness, Supple, No stridor.   Cardiovascular: Regular rate and rhythm, no murmurs.   Thorax & Lungs: Normal breath sounds, No respiratory distress, No wheezing, No chest tenderness.   Abdomen: Bowel sounds normal, Soft, No tenderness, No masses, No pulsatile masses. No peritoneal signs.  Skin: Warm, Dry, No erythema, No rash.   Back: No bony tenderness, No CVA tenderness.   Extremities: Intact distal pulses, No edema, No tenderness, No cyanosis,  Negative Eric's sign.   Musculoskeletal: There is a skin tear over the dorsum of the left wrist and forearm and hand without any deformity or obvious tenderness good range of motion in all major joints. No tenderness to palpation or major deformities noted.   Neurologic: Alert oriented to person place and time but not of events, she does have a left-sided facial droop, there is drift of the left upper extremity and decreased  strength, no drift on the right, she reports sensation feels normal to her " although when distracted does not appear to be so, no drift with the lower extremities  Psychiatric: Affect normal, Judgment normal, Mood normal.         NIH 3      DIAGNOSTIC STUDIES / PROCEDURES  Results for orders placed or performed during the hospital encounter of 04/19/23   DIAGNOSTIC ALCOHOL   Result Value Ref Range    Diagnostic Alcohol <10.1 <10.1 mg/dL   CBC WITHOUT DIFFERENTIAL   Result Value Ref Range    WBC 7.2 4.8 - 10.8 K/uL    RBC 4.31 4.20 - 5.40 M/uL    Hemoglobin 13.0 12.0 - 16.0 g/dL    Hematocrit 37.7 37.0 - 47.0 %    MCV 87.5 81.4 - 97.8 fL    MCH 30.2 27.0 - 33.0 pg    MCHC 34.5 33.6 - 35.0 g/dL    RDW 43.9 35.9 - 50.0 fL    Platelet Count 170 164 - 446 K/uL    MPV 9.6 9.0 - 12.9 fL   Prothrombin Time   Result Value Ref Range    PT 12.9 12.0 - 14.6 sec    INR 0.98 0.87 - 1.13   APTT   Result Value Ref Range    APTT 25.6 24.7 - 36.0 sec   HCG QUAL SERUM   Result Value Ref Range    Beta-Hcg Qualitative Serum Negative Negative   Comp Metabolic Panel   Result Value Ref Range    Sodium 139 135 - 145 mmol/L    Potassium 4.0 3.6 - 5.5 mmol/L    Chloride 107 96 - 112 mmol/L    Co2 22 20 - 33 mmol/L    Anion Gap 10.0 7.0 - 16.0    Glucose 96 65 - 99 mg/dL    Bun 12 8 - 22 mg/dL    Creatinine 0.57 0.50 - 1.40 mg/dL    Calcium 8.8 8.5 - 10.5 mg/dL    AST(SGOT) 15 12 - 45 U/L    ALT(SGPT) 14 2 - 50 U/L    Alkaline Phosphatase 58 30 - 99 U/L    Total Bilirubin 0.9 0.1 - 1.5 mg/dL    Albumin 3.6 3.2 - 4.9 g/dL    Total Protein 6.5 6.0 - 8.2 g/dL    Globulin 2.9 1.9 - 3.5 g/dL    A-G Ratio 1.2 g/dL   COD - Adult (Type and Screen)   Result Value Ref Range    ABO Grouping Only B     Rh Grouping Only POS     Antibody Screen-Cod NEG    ABO Rh Confirm   Result Value Ref Range    ABO Rh Confirm B POS    CORRECTED CALCIUM   Result Value Ref Range    Correct Calcium 9.1 8.5 - 10.5 mg/dL   ESTIMATED GFR   Result Value Ref Range    GFR (CKD-EPI) 87 >60 mL/min/1.73 m 2         RADIOLOGY  I have independently  interpreted the diagnostic imaging associated with this visit and am waiting the final reading from the radiologist.   My preliminary interpretation is as follows: I have reviewed the outside facility CT which shows an intraparenchymal bleed  Radiologist interpretation:   DX-FOREARM LEFT   Final Result      No acute osseous abnormality.      DX-CHEST-LIMITED (1 VIEW)   Final Result      No acute cardiopulmonary abnormality.      OUTSIDE IMAGES-CT CERVICAL SPINE   Final Result      OUTSIDE IMAGES-CT HEAD   Final Result      CT-CTA HEAD WITH & W/O-POST PROCESS    (Results Pending)         COURSE & MEDICAL DECISION MAKING        INITIAL ASSESSMENT, COURSE AND PLAN  Care Narrative: 7:20 PM  Patient evaluated at bedside, at this point differential includes traumatic brain bleed, mass, syncope.  Ordered for diagnostic labs.    And paged neurosurgery for consultation    Case discussed with Dr. Hong, he recommends repeat CT with CTA in 6 hours, Keppra 500 twice daily and ICU admission blood pressures under 160      8:00 PM  Patient is reevaluated, she is comfortable at this time, current systolics 130    8:25 PM  Case discussed with Dr. Hernandez for admission to the ICU    Discussed with Dr. Christie  as well from neurology for consultation          ADDITIONAL PROBLEM LIST  #1 Hemorrhagic intraparenchymal stroke.  Is not currently on any anticoagulation.  We will institute appropriate blood pressure control, Keppra, neurosurgery recommendations as above, neurology consultation. ICU admission for close neuro/blood pressure    #2  Abrasion/skin tear to arm.  This does not appear to be a traumatic bleed although she likely fell causing some trauma after.  Case was discussed with trauma services as well as Dr. Hong and she will be admitted to the medical ICU      DISPOSITION AND DISCUSSIONS  I have discussed management of the patient with the following physicians and NINO's: Dr. Hong neurosurgery, Dr. Grant, neurology,  Dr. Hernandez ICU,    Patient is admitted in critical condition    FINAL DIAGNOSIS  1. Hemorrhagic stroke (HCC)    2. Abrasion    3. Intraparenchymal hemorrhage of brain (HCC)        The total critical care time spent on this patient was 40minutes, resuscitating patient, speaking with admitting physician, and interpreting test results. This 40 minutes is exclusive of separately billable procedures.         Electronically signed by: Serjio White M.D., 4/19/2023 7:20 PM

## 2023-04-20 NOTE — ED NOTES
Bedside report given to KELSIE Pham. Pt transported with all belongings and family to RICU on cardiac monitor with RN and ICU tech

## 2023-04-20 NOTE — PROGRESS NOTES
4 Eyes Skin Assessment Completed by Vero RN and Sonali RN.    Head WDL  Ears WDL  Nose WDL  Mouth WDL  Neck WDL  Breast/Chest WDL  Shoulder Blades WDL  Spine WDL  (R) Arm/Elbow/Hand WDL  (L) Arm/Elbow/Hand Abrasion skin tears  Abdomen WDL  Groin WDL  Scrotum/Coccyx/Buttocks WDL  (R) Leg WDL  (L) Leg WDL  (R) Heel/Foot/Toe WDL  (L) Heel/Foot/Toe WDL          Devices In Places ECG, Blood Pressure Cuff, Pulse Ox, and SCD's      Interventions In Place Q2 Turns    Possible Skin Injury Yes    Pictures Uploaded Into Epic No, needs to be completed  Wound Consult Placed N/A  RN Wound Prevention Protocol Ordered No

## 2023-04-20 NOTE — CONSULTS
Critical Care Consultation    Date of consult: 4/19/2023    Referring Physician  Justino Webb M.D.    Reason for Consultation  Right sided intraparenchymal hemorrhage    History of Presenting Illness  88 y.o. female who presented 4/19/2023 after being found down by family. Her son reports that he called her today and he could tell something was wrong and he went to see her. She had a left sided facial droop and her left upper extremity was weak. He said that she was able to speak, but her speech was slurred. He called EMS and she was brought to [facility] and CT head showed a right intraparenchymal hemorrhage and she was transferred to [facility] for neurosurgical care. Neurosurgery, Dr Hong, was called by the ERP who recommended keppra, BP<160, and CTA in 6 hours.     Patient was admitted from 6/21/21 to 6/23/21 with a left sided hemorrhagic lesion which appeared to be a mass, but after her MRI with stealth protocol, the mass was more likely to be a hematoma. She was followed with MRIs which showed resolution of the area.     In the ICU, patient was very sleepy, but arousable. She denied pain and reports that she does not remember what happened today.     Code Status  Prior    Review of Systems   Constitutional:  Negative for chills and fever.   Respiratory:  Negative for cough.    Cardiovascular:  Negative for chest pain.   Gastrointestinal:  Negative for nausea and vomiting.   Neurological:  Positive for focal weakness and weakness.     Past Medical History  Left sided intraparenchymal hemorrhage in 2021    Surgical History   has a past surgical history that includes eye surgery (2015).    Family History  family history includes Cancer in her brother, father, sister, and son; Heart Disease in her father; Hypertension in her mother; Other in her sister.    Social History   reports that she quit smoking about 56 years ago. Her smoking use included cigarettes. She has a 6.00 pack-year smoking history. She has  never used smokeless tobacco. She reports that she does not drink alcohol and does not use drugs.    Medications  Home Medications       Reviewed by Parag Hicks (Pharmacy [facility]) on 04/19/23 at 2116  Med List Status: Complete     Medication Last Dose Status   cyanocobalamin (VITAMIN B-12) 100 MCG Tab UNK Active   Lutein 20 MG Cap UNK Active   MAGNESIUM PO UNK Active   Omega-3 Fatty Acids (FISH OIL) 1000 MG Cap capsule UNK Active   simvastatin (ZOCOR) 10 MG Tab 3 weeks ago Active   TURMERIC PO UNK Active                  Current Facility-Administered Medications   Medication Dose Route Frequency Provider Last Rate Last Admin    levETIRAcetam (Keppra) injection 500 mg  500 mg Intravenous Q12HRS Roberta Hernandez D.O.        Respiratory Therapy Consult   Nebulization Continuous RT Roberta Hernandez D.O.        acetaminophen (Tylenol) tablet 650 mg  650 mg Oral Q4HRS PRN DAT LundO.        Or    acetaminophen (TYLENOL) suppository 650 mg  650 mg Rectal Q4HRS PRN YISEL Lund.O.        ondansetron (ZOFRAN ODT) dispertab 4 mg  4 mg Oral Q4HRS PRN DAT LundO.        Or    ondansetron (ZOFRAN) syringe/vial injection 4 mg  4 mg Intravenous Q4HRS PRN DAT LundO.        MD Alert...ICU Electrolyte Replacement per Pharmacy   Other PHARMACY TO DOSE Roberta Hernandez D.O.        labetalol (NORMODYNE/TRANDATE) injection 10 mg  10 mg Intravenous Q4HRS PRN YISEL Lund.O.        hydrALAZINE (APRESOLINE) injection 10 mg  10 mg Intravenous Q4HRS PRN DAT LundO.        enalaprilat (Vasotec) injection 1.25 mg 1 mL  1.25 mg Intravenous Q6HRS PRN YISEL Lund.O.         Current Outpatient Medications   Medication Sig Dispense Refill    cyanocobalamin (VITAMIN B-12) 100 MCG Tab Take  by mouth.      simvastatin (ZOCOR) 10 MG Tab Take 1 Tablet by mouth every evening. 90 Tablet 3    MAGNESIUM PO Take  by mouth.      TURMERIC PO Take  by  mouth.      Omega-3 Fatty Acids (FISH OIL) 1000 MG Cap capsule Take 1,000 mg by mouth 3 times a day, with meals.      Lutein 20 MG Cap Take  by mouth.         Allergies  No Known Allergies    Vital Signs last 24 hours  Temp:  [35.9 °C (96.6 °F)-37.5 °C (99.5 °F)] 35.9 °C (96.6 °F)  Pulse:  [71-83] 76  Resp:  [14-18] 15  BP: (125-139)/() 125/106  SpO2:  [92 %-97 %] 93 %    Physical Exam  Constitutional:       Appearance: Normal appearance.   HENT:      Head: Normocephalic.   Eyes:      Conjunctiva/sclera: Conjunctivae normal.   Cardiovascular:      Rate and Rhythm: Normal rate and regular rhythm.   Pulmonary:      Effort: Pulmonary effort is normal.      Breath sounds: Normal breath sounds.   Skin:     General: Skin is warm and dry.   Neurological:      Mental Status: She is alert.      Comments: Mild left facial droop, left upper extremity 4/5 strength, left lower extremity 5/5        Fluids    Intake/Output Summary (Last 24 hours) at 4/19/2023 2142  Last data filed at 4/19/2023 1918  Gross per 24 hour   Intake 0 ml   Output 0 ml   Net 0 ml       Laboratory  Recent Results (from the past 48 hour(s))   CBC WITH DIFFERENTIAL    Collection Time: 04/19/23  4:41 PM   Result Value Ref Range    WBC 7.1 4.8 - 10.8 K/uL    RBC 4.36 4.20 - 5.40 M/uL    Hemoglobin 13.1 13.0 - 17.0 g/dL    Hematocrit 39.4 39.0 - 50.0 %    MCV 90.4 81.0 - 99.0 fL    MCH 30.0 27.0 - 31.0 pg    MCHC 33.2 33.0 - 37.0 g/dL    RDW 13.7 11.5 - 14.5 %    Platelet Count 171 130 - 400 K/uL    MPV 9.8 7.4 - 10.4 fL    Neutrophils Automated 64.4 39.0 - 70.0 %    Lymphocytes Automated 25.0 21.0 - 50.0 %    Monocytes Automated 9.1 (H) 2.0 - 9.0 %    Eosinophils Automated 0.6 0.0 - 5.0 %    Basophils Automated 0.6 0.0 - 3.0 %    Abs Neutrophils Automated 4.6 1.8 - 7.7 K/uL    Abs Lymph Automated 1.8 1.2 - 4.8 K/uL    Eosinophil Count, Blood 0.04 0.00 - 0.50 K/uL   COMP METABOLIC PANEL    Collection Time: 04/19/23  4:41 PM   Result Value Ref Range     Sodium 135 (L) 136 - 145 mmol/L    Potassium 4.3 3.5 - 5.1 mmol/L    Chloride 102 98 - 107 mmol/L    Co2 25 21 - 32 mmol/L    Anion Gap 12 10 - 18 mmol/L    Glucose 106 (H) 74 - 99 mg/dL    Bun 13 7 - 18 mg/dL    Creatinine 0.7 0.6 - 1.0 mg/dL    Calcium 9.0 8.5 - 11.0 mg/dL    AST(SGOT) 22 15 - 37 U/L    ALT(SGPT) 20 12 - 78 U/L    Alkaline Phosphatase 63 46 - 116 U/L    Total Bilirubin 0.9 0.2 - 1.0 mg/dL    Albumin 3.3 (L) 3.4 - 5.0 g/dL    Total Protein 6.9 6.4 - 8.2 g/dL    A-G Ratio 0.9    LIPASE    Collection Time: 04/19/23  4:41 PM   Result Value Ref Range    Lipase 41 16 - 77 U/L   CREATINE KINASE    Collection Time: 04/19/23  4:41 PM   Result Value Ref Range    CPK Total 90 26 - 192 U/L   ESTIMATED GFR    Collection Time: 04/19/23  4:41 PM   Result Value Ref Range    GFR (CKD-EPI) 83 >60 mL/min/1.73 m 2   ABO Rh Confirm    Collection Time: 04/19/23  7:22 PM   Result Value Ref Range    ABO Rh Confirm B POS    DIAGNOSTIC ALCOHOL    Collection Time: 04/19/23  7:43 PM   Result Value Ref Range    Diagnostic Alcohol <10.1 <10.1 mg/dL   CBC WITHOUT DIFFERENTIAL    Collection Time: 04/19/23  7:43 PM   Result Value Ref Range    WBC 7.2 4.8 - 10.8 K/uL    RBC 4.31 4.20 - 5.40 M/uL    Hemoglobin 13.0 12.0 - 16.0 g/dL    Hematocrit 37.7 37.0 - 47.0 %    MCV 87.5 81.4 - 97.8 fL    MCH 30.2 27.0 - 33.0 pg    MCHC 34.5 33.6 - 35.0 g/dL    RDW 43.9 35.9 - 50.0 fL    Platelet Count 170 164 - 446 K/uL    MPV 9.6 9.0 - 12.9 fL   Prothrombin Time    Collection Time: 04/19/23  7:43 PM   Result Value Ref Range    PT 12.9 12.0 - 14.6 sec    INR 0.98 0.87 - 1.13   APTT    Collection Time: 04/19/23  7:43 PM   Result Value Ref Range    APTT 25.6 24.7 - 36.0 sec   HCG QUAL SERUM    Collection Time: 04/19/23  7:43 PM   Result Value Ref Range    Beta-Hcg Qualitative Serum Negative Negative   Comp Metabolic Panel    Collection Time: 04/19/23  7:43 PM   Result Value Ref Range    Sodium 139 135 - 145 mmol/L    Potassium 4.0 3.6 - 5.5  mmol/L    Chloride 107 96 - 112 mmol/L    Co2 22 20 - 33 mmol/L    Anion Gap 10.0 7.0 - 16.0    Glucose 96 65 - 99 mg/dL    Bun 12 8 - 22 mg/dL    Creatinine 0.57 0.50 - 1.40 mg/dL    Calcium 8.8 8.5 - 10.5 mg/dL    AST(SGOT) 15 12 - 45 U/L    ALT(SGPT) 14 2 - 50 U/L    Alkaline Phosphatase 58 30 - 99 U/L    Total Bilirubin 0.9 0.1 - 1.5 mg/dL    Albumin 3.6 3.2 - 4.9 g/dL    Total Protein 6.5 6.0 - 8.2 g/dL    Globulin 2.9 1.9 - 3.5 g/dL    A-G Ratio 1.2 g/dL   PLATELET MAPPING WITH BASIC TEG    Collection Time: 04/19/23  7:43 PM   Result Value Ref Range    Reaction Time Initial-R 3.7 (L) 4.6 - 9.1 min    React Time Initial Hep 3.4 (L) 4.3 - 8.3 min    Clot Kinetics-K 1.3 0.8 - 2.1 min    Clot Angle-Angle 74.5 63.0 - 78.0 degrees    Maximum Clot Strength-MA 58.5 52.0 - 69.0 mm    TEG Functional Fibrinogen(MA) 19.6 15.0 - 32.0 mm    Lysis 30 minutes-LY30 1.1 0.0 - 2.6 %    % Inhibition ADP 19.3 (H) 0.0 - 17.0 %    % Inhibition AA 8.6 0.0 - 11.0 %    TEG Algorithm Link Algorithm    COD - Adult (Type and Screen)    Collection Time: 04/19/23  7:43 PM   Result Value Ref Range    ABO Grouping Only B     Rh Grouping Only POS     Antibody Screen-Cod NEG    CORRECTED CALCIUM    Collection Time: 04/19/23  7:43 PM   Result Value Ref Range    Correct Calcium 9.1 8.5 - 10.5 mg/dL   ESTIMATED GFR    Collection Time: 04/19/23  7:43 PM   Result Value Ref Range    GFR (CKD-EPI) 87 >60 mL/min/1.73 m 2       Imaging  DX-FOREARM LEFT   Final Result      No acute osseous abnormality.      DX-CHEST-LIMITED (1 VIEW)   Final Result      No acute cardiopulmonary abnormality.      OUTSIDE IMAGES-CT CERVICAL SPINE   Final Result      OUTSIDE IMAGES-CT HEAD   Final Result      CT-CTA HEAD WITH & W/O-POST PROCESS    (Results Pending)       Assessment/Plan  * Intraparenchymal hemorrhage of brain (HCC)- (present on admission)  Assessment & Plan  Right sided intraparenchymal hemorrhage, no sign of head trauma, but was found on the floor   Concern  for amyloid angiopathy as this is her second, non-hypertensive bleed     Admit to ICU   Neurosurgery consulted and recs keppra and neurocyoko  Repeat CT with angio around 0100   Follow up MRI   BP goal <160   Neurology consulted     Trauma- (present on admission)  Assessment & Plan  Reported pain in her left arm XR negative       Discussed patient condition and risk of morbidity and/or mortality with Family, RN, and Patient.      The patient remains critically ill.  Critical care time = 42 minutes in directly providing and coordinating critical care and extensive data review.  No time overlap and excludes procedures.    Roberta Hernandez, DO   Pulmonary and Critical Care

## 2023-04-20 NOTE — PROGRESS NOTES
Images reviewed. R hemorrhagic stroke with expected L weakness, but awake and alert per ED. Admit ICU hospitalist Q2 hr neuro checks, repeat scan with CTA in 6 hours, BP < 160, at her age we will try to get her through this without surgery.

## 2023-04-20 NOTE — CONSULTS
Physical Medicine and Rehabilitation Consultation              Date of initial consultation: 4/20/2023  Consulting provider: Roberta Hernandez D.O.  Reason for consultation: assess for acute inpatient rehab appropriateness  LOS: 1 Day(s)    Chief complaint: Right intraparenchymal hemorrhage    HPI: The patient is a 88 y.o. right hand dominant female with a past medical history of left sided hemorrhage in 2021;  who presented on 4/19/2023  7:07 PM as a transfer from [facility] with advanced imaging showing 5 x 4 cm right intraparenchymal hematoma.  Patient was found on the floor by her son with left facial droop.  Patient came to check on her after he called and patient reported that she cannot move her left side.  Patient was initially brought to [facility] and then transferred to [facility] for neurosurgical evaluation.  Patient was seen by neurology and found to have NIH score of 5.  CTA head today shows large right frontal parenchymal hemorrhage and left frontal parenchymal hemorrhage, both with surrounding vasogenic edema    The patient currently reports lethargy. She is napping and keeps her eyes closed while following commands. When vision was tested during physical exam she appears to have a left VF cut. Patient denies most ROS including headache, numbness and tingling. She is later joined in the room by her daughter who helps answer questions.     ROS  Pertinent positives are mentioned in the HPI, all others reviewed and are negative.    Social Hx:  1 SH  1 MARBIN  With: Alone, in [place].  Patient has 2 sons and a daughter who are able to provide 24/7 support on discharge    THERAPY:  Restrictions: Bedrest  PT: Functional mobility   Pending    OT: ADLs  Pending    SLP:   Pending    IMAGING:  CTA head 4/20/2023  1.  No large vessel occlusion or aneurysm identified.  2.  Right frontal parenchymal hemorrhage with surrounding vasogenic edema, stable.  3.  Left frontal parenchymal hemorrhage  "with surrounding vasogenic edema, stable.  4.  Right parietal subarachnoid hemorrhage, stable.    PROCEDURES:  None    PMH:  History reviewed. No pertinent past medical history.    PSH:  Past Surgical History:   Procedure Laterality Date    EYE SURGERY  2015    cataracts       FHX:  Family History   Problem Relation Age of Onset    Hypertension Mother     Cancer Father     Heart Disease Father     Other Sister         myasthenia gravis    Cancer Brother     Cancer Sister         pancreatic    Cancer Son         melanoma       Medications:  Current Facility-Administered Medications   Medication Dose    NS infusion      levETIRAcetam (Keppra) injection 500 mg  500 mg    Respiratory Therapy Consult      acetaminophen (Tylenol) tablet 650 mg  650 mg    Or    acetaminophen (TYLENOL) suppository 650 mg  650 mg    ondansetron (ZOFRAN ODT) dispertab 4 mg  4 mg    Or    ondansetron (ZOFRAN) syringe/vial injection 4 mg  4 mg    MD Alert...ICU Electrolyte Replacement per Pharmacy      labetalol (NORMODYNE/TRANDATE) injection 10 mg  10 mg    hydrALAZINE (APRESOLINE) injection 10 mg  10 mg    enalaprilat (Vasotec) injection 1.25 mg 1 mL  1.25 mg       Allergies:  No Known Allergies      Physical Exam:  Vitals: /60   Pulse 74   Temp 36.8 °C (98.3 °F) (Temporal)   Resp 20   Ht 1.6 m (5' 2.99\")   Wt 61.2 kg (134 lb 14.7 oz)   SpO2 96%   Gen: NAD  Head: NC/AT  Eyes/ Nose/ Mouth: moist mucous membranes  Cardio: RRR, good distal perfusion, warm extremities  Pulm: normal respiratory effort, no cyanosis   Abd: Soft NTND, negative borborygmi   Ext: No peripheral edema. No calf tenderness. No clubbing.    Mental status: follows commands  Speech: mild dysarthria     CRANIAL NERVES:  2,3: Left VF cut   3,4,6: EOMI bilaterally, no nystagmus or diplopia  5: sensation intact to light touch bilaterally and symmetric  7: left facial droop   8: hearing grossly intact      Motor:      Upper Extremity  Myotome R L   Shoulder flexion C5 " 5 4/5   Elbow flexion C5 5 4/5   Wrist extension C6 5 4/5   Elbow extension C7 5 4/5   Finger flexion C8 5 3/5   Finger abduction T1 5 2/5     Lower Extremity Myotome R L   Hip flexion L2 5 4/5   Knee extension L3 5 4/5   Ankle dorsiflexion L4 5 5   Toe extension L5 5 5   Ankle plantarflexion S1 5 5         Sensory:   intact to light touch through out    Labs: Reviewed and significant for   Recent Labs     04/19/23 1641 04/19/23 1943 04/20/23  0204   RBC 4.36 4.31 4.32   HEMOGLOBIN 13.1 13.0 13.0   HEMATOCRIT 39.4 37.7 37.5   PLATELETCT 171 170 171   PROTHROMBTM  --  12.9  --    APTT  --  25.6  --    INR  --  0.98  --      Recent Labs     04/19/23 1641 04/19/23 1943 04/20/23 0204 04/20/23  0810   SODIUM 135* 139 138 137   POTASSIUM 4.3 4.0 3.8  --    CHLORIDE 102 107 105  --    CO2 25 22 21  --    GLUCOSE 106* 96 95  --    BUN 13 12 12  --    CREATININE 0.7 0.57 0.52  --    CALCIUM 9.0 8.8 8.6  --      Recent Results (from the past 24 hour(s))   CBC WITH DIFFERENTIAL    Collection Time: 04/19/23  4:41 PM   Result Value Ref Range    WBC 7.1 4.8 - 10.8 K/uL    RBC 4.36 4.20 - 5.40 M/uL    Hemoglobin 13.1 13.0 - 17.0 g/dL    Hematocrit 39.4 39.0 - 50.0 %    MCV 90.4 81.0 - 99.0 fL    MCH 30.0 27.0 - 31.0 pg    MCHC 33.2 33.0 - 37.0 g/dL    RDW 13.7 11.5 - 14.5 %    Platelet Count 171 130 - 400 K/uL    MPV 9.8 7.4 - 10.4 fL    Neutrophils Automated 64.4 39.0 - 70.0 %    Lymphocytes Automated 25.0 21.0 - 50.0 %    Monocytes Automated 9.1 (H) 2.0 - 9.0 %    Eosinophils Automated 0.6 0.0 - 5.0 %    Basophils Automated 0.6 0.0 - 3.0 %    Abs Neutrophils Automated 4.6 1.8 - 7.7 K/uL    Abs Lymph Automated 1.8 1.2 - 4.8 K/uL    Eosinophil Count, Blood 0.04 0.00 - 0.50 K/uL   COMP METABOLIC PANEL    Collection Time: 04/19/23  4:41 PM   Result Value Ref Range    Sodium 135 (L) 136 - 145 mmol/L    Potassium 4.3 3.5 - 5.1 mmol/L    Chloride 102 98 - 107 mmol/L    Co2 25 21 - 32 mmol/L    Anion Gap 12 10 - 18 mmol/L     Glucose 106 (H) 74 - 99 mg/dL    Bun 13 7 - 18 mg/dL    Creatinine 0.7 0.6 - 1.0 mg/dL    Calcium 9.0 8.5 - 11.0 mg/dL    AST(SGOT) 22 15 - 37 U/L    ALT(SGPT) 20 12 - 78 U/L    Alkaline Phosphatase 63 46 - 116 U/L    Total Bilirubin 0.9 0.2 - 1.0 mg/dL    Albumin 3.3 (L) 3.4 - 5.0 g/dL    Total Protein 6.9 6.4 - 8.2 g/dL    A-G Ratio 0.9    LIPASE    Collection Time: 04/19/23  4:41 PM   Result Value Ref Range    Lipase 41 16 - 77 U/L   CREATINE KINASE    Collection Time: 04/19/23  4:41 PM   Result Value Ref Range    CPK Total 90 26 - 192 U/L   ESTIMATED GFR    Collection Time: 04/19/23  4:41 PM   Result Value Ref Range    GFR (CKD-EPI) 83 >60 mL/min/1.73 m 2   ABO Rh Confirm    Collection Time: 04/19/23  7:22 PM   Result Value Ref Range    ABO Rh Confirm B POS    DIAGNOSTIC ALCOHOL    Collection Time: 04/19/23  7:43 PM   Result Value Ref Range    Diagnostic Alcohol <10.1 <10.1 mg/dL   CBC WITHOUT DIFFERENTIAL    Collection Time: 04/19/23  7:43 PM   Result Value Ref Range    WBC 7.2 4.8 - 10.8 K/uL    RBC 4.31 4.20 - 5.40 M/uL    Hemoglobin 13.0 12.0 - 16.0 g/dL    Hematocrit 37.7 37.0 - 47.0 %    MCV 87.5 81.4 - 97.8 fL    MCH 30.2 27.0 - 33.0 pg    MCHC 34.5 33.6 - 35.0 g/dL    RDW 43.9 35.9 - 50.0 fL    Platelet Count 170 164 - 446 K/uL    MPV 9.6 9.0 - 12.9 fL   Prothrombin Time    Collection Time: 04/19/23  7:43 PM   Result Value Ref Range    PT 12.9 12.0 - 14.6 sec    INR 0.98 0.87 - 1.13   APTT    Collection Time: 04/19/23  7:43 PM   Result Value Ref Range    APTT 25.6 24.7 - 36.0 sec   HCG QUAL SERUM    Collection Time: 04/19/23  7:43 PM   Result Value Ref Range    Beta-Hcg Qualitative Serum Negative Negative   Comp Metabolic Panel    Collection Time: 04/19/23  7:43 PM   Result Value Ref Range    Sodium 139 135 - 145 mmol/L    Potassium 4.0 3.6 - 5.5 mmol/L    Chloride 107 96 - 112 mmol/L    Co2 22 20 - 33 mmol/L    Anion Gap 10.0 7.0 - 16.0    Glucose 96 65 - 99 mg/dL    Bun 12 8 - 22 mg/dL    Creatinine  0.57 0.50 - 1.40 mg/dL    Calcium 8.8 8.5 - 10.5 mg/dL    AST(SGOT) 15 12 - 45 U/L    ALT(SGPT) 14 2 - 50 U/L    Alkaline Phosphatase 58 30 - 99 U/L    Total Bilirubin 0.9 0.1 - 1.5 mg/dL    Albumin 3.6 3.2 - 4.9 g/dL    Total Protein 6.5 6.0 - 8.2 g/dL    Globulin 2.9 1.9 - 3.5 g/dL    A-G Ratio 1.2 g/dL   PLATELET MAPPING WITH BASIC TEG    Collection Time: 04/19/23  7:43 PM   Result Value Ref Range    Reaction Time Initial-R 3.7 (L) 4.6 - 9.1 min    React Time Initial Hep 3.4 (L) 4.3 - 8.3 min    Clot Kinetics-K 1.3 0.8 - 2.1 min    Clot Angle-Angle 74.5 63.0 - 78.0 degrees    Maximum Clot Strength-MA 58.5 52.0 - 69.0 mm    TEG Functional Fibrinogen(MA) 19.6 15.0 - 32.0 mm    Lysis 30 minutes-LY30 1.1 0.0 - 2.6 %    % Inhibition ADP 19.3 (H) 0.0 - 17.0 %    % Inhibition AA 8.6 0.0 - 11.0 %    TEG Algorithm Link Algorithm    COD - Adult (Type and Screen)    Collection Time: 04/19/23  7:43 PM   Result Value Ref Range    ABO Grouping Only B     Rh Grouping Only POS     Antibody Screen-Cod NEG    CORRECTED CALCIUM    Collection Time: 04/19/23  7:43 PM   Result Value Ref Range    Correct Calcium 9.1 8.5 - 10.5 mg/dL   ESTIMATED GFR    Collection Time: 04/19/23  7:43 PM   Result Value Ref Range    GFR (CKD-EPI) 87 >60 mL/min/1.73 m 2   POCT glucose device results    Collection Time: 04/20/23  2:01 AM   Result Value Ref Range    POC Glucose, Blood 90 65 - 99 mg/dL   CBC with Differential    Collection Time: 04/20/23  2:04 AM   Result Value Ref Range    WBC 6.2 4.8 - 10.8 K/uL    RBC 4.32 4.20 - 5.40 M/uL    Hemoglobin 13.0 12.0 - 16.0 g/dL    Hematocrit 37.5 37.0 - 47.0 %    MCV 86.8 81.4 - 97.8 fL    MCH 30.1 27.0 - 33.0 pg    MCHC 34.7 33.6 - 35.0 g/dL    RDW 43.3 35.9 - 50.0 fL    Platelet Count 171 164 - 446 K/uL    MPV 9.6 9.0 - 12.9 fL    Neutrophils-Polys 67.50 44.00 - 72.00 %    Lymphocytes 20.60 (L) 22.00 - 41.00 %    Monocytes 10.80 0.00 - 13.40 %    Eosinophils 0.30 0.00 - 6.90 %    Basophils 0.60 0.00 - 1.80  %    Immature Granulocytes 0.20 0.00 - 0.90 %    Nucleated RBC 0.00 /100 WBC    Neutrophils (Absolute) 4.18 2.00 - 7.15 K/uL    Lymphs (Absolute) 1.28 1.00 - 4.80 K/uL    Monos (Absolute) 0.67 0.00 - 0.85 K/uL    Eos (Absolute) 0.02 0.00 - 0.51 K/uL    Baso (Absolute) 0.04 0.00 - 0.12 K/uL    Immature Granulocytes (abs) 0.01 0.00 - 0.11 K/uL    NRBC (Absolute) 0.00 K/uL   Complete Metabolic Panel    Collection Time: 04/20/23  2:04 AM   Result Value Ref Range    Sodium 138 135 - 145 mmol/L    Potassium 3.8 3.6 - 5.5 mmol/L    Chloride 105 96 - 112 mmol/L    Co2 21 20 - 33 mmol/L    Anion Gap 12.0 7.0 - 16.0    Glucose 95 65 - 99 mg/dL    Bun 12 8 - 22 mg/dL    Creatinine 0.52 0.50 - 1.40 mg/dL    Calcium 8.6 8.5 - 10.5 mg/dL    AST(SGOT) 18 12 - 45 U/L    ALT(SGPT) 15 2 - 50 U/L    Alkaline Phosphatase 55 30 - 99 U/L    Total Bilirubin 1.1 0.1 - 1.5 mg/dL    Albumin 3.6 3.2 - 4.9 g/dL    Total Protein 6.3 6.0 - 8.2 g/dL    Globulin 2.7 1.9 - 3.5 g/dL    A-G Ratio 1.3 g/dL   CORRECTED CALCIUM    Collection Time: 04/20/23  2:04 AM   Result Value Ref Range    Correct Calcium 8.9 8.5 - 10.5 mg/dL   ESTIMATED GFR    Collection Time: 04/20/23  2:04 AM   Result Value Ref Range    GFR (CKD-EPI) 89 >60 mL/min/1.73 m 2   POCT glucose device results    Collection Time: 04/20/23  8:09 AM   Result Value Ref Range    POC Glucose, Blood 91 65 - 99 mg/dL   Sodium Serum (NA)    Collection Time: 04/20/23  8:10 AM   Result Value Ref Range    Sodium 137 135 - 145 mmol/L         ASSESSMENT:  Patient is a 88 y.o. female admitted with right intraparenchymal hematoma and left sided deficits in strength and vision     [facility] Code / Diagnosis to Support: 0001.1 - Stroke: Left Body Involvement (Right Brain)    Rehabilitation: Impaired ADLs and mobility  Patient is expected to be a good candidate for inpatient rehab based on needs for PT, OT, and speech therapy.  Patient will also benefit from family training.  Patient has a good discharge  situation which will be home with children.     Barriers to transfer include: Insurance authorization, TCCs to verify disposition, medical clearance and bed availability     All cases are subject to administrative review and recommendations may change    Disposition recommendations:  - Good candidate for IPR. Patient is expected to have deficits with mobility and ADLs secondary to her stroke. Patient has good support from her family and an appropriate DC environment.   - TCC to confirm DC support  - Awaiting PT/OT notes   -PMR to follow in the periphery for rehab appropriateness, please reach out with questions or request for medical management    Medical Complexity:    Right intraparenchymal hemorrhage   - close observation in ICU. Appreciate neurosurgical and neurocritical care management  - SBP <160   - Keppra 500mg x 7 days   - MR brain pending   - Awaiting PT/OT and SLP   - Patient is expected to require IPR when medically cleared     Hypertension   - Labetalol PRN   - Hydralazine PRN     DVT PPX: SCDs      Thank you for allowing us to participate in the care of this patient.     Patient was seen for >60 minutes on unit/floor of which > 50% of time was spent on counseling and coordination of care regarding the above, including prognosis, risk reduction, benefits of treatment, and options for next stage of care.    José Miguel Davis, DO   Physical Medicine and Rehabilitation     Please note that this dictation was created using voice recognition software. I have made every reasonable attempt to correct obvious errors, but there may be errors of grammar and possibly content that I did not discover before finalizing the note.

## 2023-04-20 NOTE — CONSULTS
DATE OF SERVICE:  04/20/2023     INPATIENT CONSULTATION     CHIEF COMPLAINT:  Intracranial hemorrhages.     HISTORY OF PRESENT ILLNESS:  This is an 88-year-old right-handed woman who was   found down.  She states she is unclear on the overall events.  She had   weakness on the left side.  She was taken to [facility] and a CAT   scan there showed a 5x4 cm right intraparenchymal hematoma on the periphery as   well as a small left frontal lesion and hyperdensity much smaller.  There is   mild midline shift regarding that.  She was transferred to [facility].  A CTA was   negative for any further vascular etiology identification and also stable   scans.  She has been stable since arrival.  Sleepy, but arousable.     PAST MEDICAL HISTORY:  Significant for previous left sided hemorrhage in 2021.     PAST SURGICAL HISTORY:  She denies any significant surgery, but the chart says   she had eye surgery.     SOCIAL HISTORY:  She is a former smoker.  She does not drink.     FAMILY HISTORY:  There is no family at her bedside.     PHYSICAL EXAMINATION:  She opens her eyes to voice.  She does seem to have a   right gaze preference.  Her pupils are symmetric.  Extraocular motions are   intact.  She has a mild left lower facial droop.  She has some movement of the   left upper extremity to volition and pain.  She follows commands briskly in   the right upper extremity and bilateral lower extremities symmetrically.  She   has intact sensation to face, arms and legs.  She is oriented to person, place   and time.     ASSESSMENT AND PLAN:  The patient has a stable intraparenchymal hemorrhage?   amyloid versus hypertensive.  Because of the areas of other hyperdensity and   hypodensity, I recommend an MRI of the brain with and without contrast to make   sure we do not have an occult lesion.  She was admitted to the ICU for q. 2   hour neuro checks, blood pressure less than 160, Keppra 500 b.i.d. x7 days.  I   will defer the rest of  care to the hospitalist and Stroke Neurology.     Thanks for allowing me to participate in her care.           ______________________________  MD RIKI Vaca III/JOSE MANUEL    DD:  04/20/2023 07:25  DT:  04/20/2023 08:07    Job#:  [id number]

## 2023-04-20 NOTE — ASSESSMENT & PLAN NOTE
Right sided intraparenchymal hemorrhage, no sign of head trauma, but was found on the floor   Concern for amyloid angiopathy as this is her second, non-hypertensive bleed     Discussed with neurology. MRI with amyloid. Risk of rebleed is high and recommending palliative consult for GOC.  Stop Keppra, no indication  Q4h neurocheck  F/u pending studies  Ok to downgrade from ICU

## 2023-04-21 ENCOUNTER — APPOINTMENT (OUTPATIENT)
Dept: RADIOLOGY | Facility: MEDICAL CENTER | Age: 88
DRG: 064 | End: 2023-04-21
Attending: PSYCHIATRY & NEUROLOGY
Payer: MEDICARE

## 2023-04-21 LAB
ALBUMIN SERPL BCP-MCNC: 3.5 G/DL (ref 3.2–4.9)
ALBUMIN/GLOB SERPL: 1.4 G/DL
ALP SERPL-CCNC: 52 U/L (ref 30–99)
ALT SERPL-CCNC: 12 U/L (ref 2–50)
ANION GAP SERPL CALC-SCNC: 11 MMOL/L (ref 7–16)
AST SERPL-CCNC: 17 U/L (ref 12–45)
BASOPHILS # BLD AUTO: 0.3 % (ref 0–1.8)
BASOPHILS # BLD: 0.02 K/UL (ref 0–0.12)
BILIRUB SERPL-MCNC: 1.3 MG/DL (ref 0.1–1.5)
BUN SERPL-MCNC: 12 MG/DL (ref 8–22)
CALCIUM ALBUM COR SERPL-MCNC: 8.8 MG/DL (ref 8.5–10.5)
CALCIUM SERPL-MCNC: 8.4 MG/DL (ref 8.5–10.5)
CHLORIDE SERPL-SCNC: 107 MMOL/L (ref 96–112)
CO2 SERPL-SCNC: 19 MMOL/L (ref 20–33)
CREAT SERPL-MCNC: 0.49 MG/DL (ref 0.5–1.4)
EOSINOPHIL # BLD AUTO: 0.03 K/UL (ref 0–0.51)
EOSINOPHIL NFR BLD: 0.5 % (ref 0–6.9)
ERYTHROCYTE [DISTWIDTH] IN BLOOD BY AUTOMATED COUNT: 43.8 FL (ref 35.9–50)
GFR SERPLBLD CREATININE-BSD FMLA CKD-EPI: 90 ML/MIN/1.73 M 2
GLOBULIN SER CALC-MCNC: 2.5 G/DL (ref 1.9–3.5)
GLUCOSE BLD STRIP.AUTO-MCNC: 76 MG/DL (ref 65–99)
GLUCOSE SERPL-MCNC: 87 MG/DL (ref 65–99)
HCT VFR BLD AUTO: 37.8 % (ref 37–47)
HGB BLD-MCNC: 12.8 G/DL (ref 12–16)
IMM GRANULOCYTES # BLD AUTO: 0.02 K/UL (ref 0–0.11)
IMM GRANULOCYTES NFR BLD AUTO: 0.3 % (ref 0–0.9)
LYMPHOCYTES # BLD AUTO: 1.22 K/UL (ref 1–4.8)
LYMPHOCYTES NFR BLD: 20.3 % (ref 22–41)
MCH RBC QN AUTO: 30.2 PG (ref 27–33)
MCHC RBC AUTO-ENTMCNC: 33.9 G/DL (ref 33.6–35)
MCV RBC AUTO: 89.2 FL (ref 81.4–97.8)
MONOCYTES # BLD AUTO: 0.66 K/UL (ref 0–0.85)
MONOCYTES NFR BLD AUTO: 11 % (ref 0–13.4)
NEUTROPHILS # BLD AUTO: 4.06 K/UL (ref 2–7.15)
NEUTROPHILS NFR BLD: 67.6 % (ref 44–72)
NRBC # BLD AUTO: 0 K/UL
NRBC BLD-RTO: 0 /100 WBC
PLATELET # BLD AUTO: 179 K/UL (ref 164–446)
PMV BLD AUTO: 9.2 FL (ref 9–12.9)
POTASSIUM SERPL-SCNC: 3.9 MMOL/L (ref 3.6–5.5)
PROT SERPL-MCNC: 6 G/DL (ref 6–8.2)
RBC # BLD AUTO: 4.24 M/UL (ref 4.2–5.4)
SODIUM SERPL-SCNC: 137 MMOL/L (ref 135–145)
SODIUM SERPL-SCNC: 138 MMOL/L (ref 135–145)
WBC # BLD AUTO: 6 K/UL (ref 4.8–10.8)

## 2023-04-21 PROCEDURE — 770001 HCHG ROOM/CARE - MED/SURG/GYN PRIV*

## 2023-04-21 PROCEDURE — 99232 SBSQ HOSP IP/OBS MODERATE 35: CPT | Performed by: INTERNAL MEDICINE

## 2023-04-21 PROCEDURE — 80053 COMPREHEN METABOLIC PANEL: CPT

## 2023-04-21 PROCEDURE — 85025 COMPLETE CBC W/AUTO DIFF WBC: CPT

## 2023-04-21 PROCEDURE — 97162 PT EVAL MOD COMPLEX 30 MIN: CPT

## 2023-04-21 PROCEDURE — A9270 NON-COVERED ITEM OR SERVICE: HCPCS | Performed by: HOSPITALIST

## 2023-04-21 PROCEDURE — 99222 1ST HOSP IP/OBS MODERATE 55: CPT | Performed by: HOSPITALIST

## 2023-04-21 PROCEDURE — 92523 SPEECH SOUND LANG COMPREHEN: CPT

## 2023-04-21 PROCEDURE — 700105 HCHG RX REV CODE 258: Performed by: HOSPITALIST

## 2023-04-21 PROCEDURE — 84295 ASSAY OF SERUM SODIUM: CPT

## 2023-04-21 PROCEDURE — 97167 OT EVAL HIGH COMPLEX 60 MIN: CPT

## 2023-04-21 PROCEDURE — 99232 SBSQ HOSP IP/OBS MODERATE 35: CPT | Performed by: PSYCHIATRY & NEUROLOGY

## 2023-04-21 PROCEDURE — 700111 HCHG RX REV CODE 636 W/ 250 OVERRIDE (IP): Performed by: INTERNAL MEDICINE

## 2023-04-21 PROCEDURE — 700102 HCHG RX REV CODE 250 W/ 637 OVERRIDE(OP): Performed by: HOSPITALIST

## 2023-04-21 PROCEDURE — 97535 SELF CARE MNGMENT TRAINING: CPT

## 2023-04-21 PROCEDURE — 82962 GLUCOSE BLOOD TEST: CPT

## 2023-04-21 RX ORDER — SODIUM CHLORIDE, SODIUM LACTATE, POTASSIUM CHLORIDE, CALCIUM CHLORIDE 600; 310; 30; 20 MG/100ML; MG/100ML; MG/100ML; MG/100ML
INJECTION, SOLUTION INTRAVENOUS CONTINUOUS
Status: DISCONTINUED | OUTPATIENT
Start: 2023-04-21 | End: 2023-04-22 | Stop reason: HOSPADM

## 2023-04-21 RX ORDER — BACITRACIN ZINC 500 [USP'U]/G
OINTMENT TOPICAL 2 TIMES DAILY
Status: DISCONTINUED | OUTPATIENT
Start: 2023-04-21 | End: 2023-04-22 | Stop reason: HOSPADM

## 2023-04-21 RX ADMIN — LEVETIRACETAM 500 MG: 100 INJECTION, SOLUTION INTRAVENOUS at 06:23

## 2023-04-21 RX ADMIN — SODIUM CHLORIDE, POTASSIUM CHLORIDE, SODIUM LACTATE AND CALCIUM CHLORIDE: 600; 310; 30; 20 INJECTION, SOLUTION INTRAVENOUS at 17:51

## 2023-04-21 RX ADMIN — BACITRACIN ZINC: 500 OINTMENT TOPICAL at 17:59

## 2023-04-21 ASSESSMENT — COGNITIVE AND FUNCTIONAL STATUS - GENERAL
DRESSING REGULAR UPPER BODY CLOTHING: A LOT
STANDING UP FROM CHAIR USING ARMS: A LOT
MOVING TO AND FROM BED TO CHAIR: A LITTLE
DAILY ACTIVITIY SCORE: 12
PERSONAL GROOMING: A LOT
CLIMB 3 TO 5 STEPS WITH RAILING: TOTAL
DRESSING REGULAR LOWER BODY CLOTHING: A LOT
SUGGESTED CMS G CODE MODIFIER MOBILITY: CL
TOILETING: TOTAL
MOVING FROM LYING ON BACK TO SITTING ON SIDE OF FLAT BED: A LOT
HELP NEEDED FOR BATHING: A LOT
EATING MEALS: A LITTLE
WALKING IN HOSPITAL ROOM: A LOT
MOBILITY SCORE: 14
SUGGESTED CMS G CODE MODIFIER DAILY ACTIVITY: CL

## 2023-04-21 ASSESSMENT — GAIT ASSESSMENTS
DISTANCE (FEET): 5
GAIT LEVEL OF ASSIST: MODERATE ASSIST
ASSISTIVE DEVICE: HAND HELD ASSIST
DEVIATION: DECREASED BASE OF SUPPORT;BRADYKINETIC;SHUFFLED GAIT;DECREASED HEEL STRIKE;DECREASED TOE OFF

## 2023-04-21 ASSESSMENT — PAIN DESCRIPTION - PAIN TYPE: TYPE: ACUTE PAIN

## 2023-04-21 ASSESSMENT — ENCOUNTER SYMPTOMS
CHILLS: 0
FEVER: 0

## 2023-04-21 ASSESSMENT — ACTIVITIES OF DAILY LIVING (ADL): TOILETING: INDEPENDENT

## 2023-04-21 NOTE — PROGRESS NOTES
Neurosurgery Progress Note    Subjective:  This is an 88-year-old right-handed woman who was found down.  She states she is unclear on the overall events.  She had weakness on the left side.  She was taken to [facility] and a CAT scan there showed a 5x4 cm right intraparenchymal hematoma on the periphery as well as a small left frontal lesion and hyperdensity much smaller. There is mild midline shift regarding that.  She was transferred to [facility].  A CTA was negative for any further vascular etiology identification and also stable scans.  She has been stable since arrival.  Sleepy, but arousable.    No events overnight  Left hemiparesis improving    Head CTA  reviewed by Dr. Hong, stable    Exam:  Pleasant and cooperative, drowsy  A&Ox3  Slight right gaze and left facial droop  Pupils 2-3mm symmetric and reactive  Following commands briskly  Left upper and lower extremity strength 4/5 throughout with decreased sensation  Intact on right      BP  Min: 105/56  Max: 136/66  Pulse  Av.5  Min: 61  Max: 79  Resp  Av.3  Min: 11  Max: 32  Temp  Av.6 °C (97.9 °F)  Min: 36.6 °C (97.9 °F)  Max: 36.6 °C (97.9 °F)  Monitored Temp 2  Av.7 °C (99.9 °F)  Min: 37.1 °C (98.8 °F)  Max: 38.1 °C (100.6 °F)  SpO2  Av.7 %  Min: 92 %  Max: 98 %    No data recorded    Recent Labs     23  0349   WBC 7.2 6.2 6.0   RBC 4.31 4.32 4.24   HEMOGLOBIN 13.0 13.0 12.8   HEMATOCRIT 37.7 37.5 37.8   MCV 87.5 86.8 89.2   MCH 30.2 30.1 30.2   MCHC 34.5 34.7 33.9   RDW 43.9 43.3 43.8   PLATELETCT 170 171 179   MPV 9.6 9.6 9.2       Recent Labs     23  0810 23  0349 23  0828   SODIUM 139 138   < > 137 137 138   POTASSIUM 4.0 3.8  --   --  3.9  --    CHLORIDE 107 105  --   --  107  --    CO2 22 21  --   --  19*  --    GLUCOSE 96 95  --   --  87  --    BUN 12 12  --   --  12  --    CREATININE 0.57 0.52  --   --  0.49*   --    CALCIUM 8.8 8.6  --   --  8.4*  --     < > = values in this interval not displayed.       Recent Labs     04/19/23 1943   APTT 25.6   INR 0.98       Recent Labs     04/19/23 1943   REACTMIN 3.7*   CLOTKINET 1.3   CLOTANGL 74.5   MAXCLOTS 58.5   EQJ68OIP 1.1   PRCINADP 19.3*   PRCINAA 8.6         Intake/Output                         04/20/23 0700 - 04/21/23 0659 04/21/23 0700 - 04/22/23 0659     [id number] 8503-2353 Total 3357-0708 5920-0374 Total                 Intake    I.V.  523.2  905.9 1429.1  318.6  -- 318.6    Volume (mL) (NS infusion) 523.2 905.9 1429.1 318.6 -- 318.6    Total Intake 523.2 905.9 1429.1 318.6 -- 318.6       Output    Urine  216  465 681  275  -- 275    Output (mL) (Urethral Catheter Latex;Temperature probe) 216 465 681 275 -- 275    Stool  --  -- --  --  -- --    Number of Times Stooled 0 x -- 0 x 0 x -- 0 x    Total Output 216 465 681 275 -- 275       Net I/O     307.2 440.9 748.1 43.6 -- 43.6              Intake/Output Summary (Last 24 hours) at 4/21/2023 1343  Last data filed at 4/21/2023 1000  Gross per 24 hour   Intake 1682.19 ml   Output 956 ml   Net 726.19 ml         $ Bladder Scan Results (mL): 210     Respiratory Therapy Consult   Continuous RT    acetaminophen  650 mg Q4HRS PRN    Or    acetaminophen  650 mg Q4HRS PRN    ondansetron  4 mg Q4HRS PRN    Or    ondansetron  4 mg Q4HRS PRN    labetalol  10 mg Q4HRS PRN    hydrALAZINE  10 mg Q4HRS PRN    enalaprilat  1.25 mg Q6HRS PRN       Assessment and Plan:  Hospital day # 3  Q 2 hr neuro checks  Keep systolic BP <160  Keppra 500mg BID x 7days  Brain MRI pending  OK for q 4 hr neuro checks and transfer to NS floor  No NS planned at this time, we will sigh off now.  Please call with any questions or re-consult, [phone]  Would like patient to f/u in Dr. Hong's office in 4 weeks with updated head CT no contrast    Chemical prophylactic DVT therapy: No  Start date/time: TBD

## 2023-04-21 NOTE — DISCHARGE PLANNING
[facility] Transitional Care Coordination    Physiatry consult complete.  Dr. Davis recommending - Patient is expected to be a good candidate for inpatient rehab based on needs for PT, OT, and speech therapy.      Call out to son Devin Colindres to discuss IRF referral.  Explained specifics of inpatient rehab, answered questions/concerns.   Devin endorses admission [facility], tells me he/siblings will provide 24/7 support/supervision for patient when she returns to her home at discharge.  Reviewed Medicare/[employer] insurance.  Reviewed [facility] COVID testing and visitor policies.  Devin voiced understanding.  Devin aware possible admission RR this weekend, pending medical clearance.     1733 -  In anticipation of medical clearance, GMT transport to [facility] scheduled 1130a Saturday 4/22.  Telephone update to son Devin Colindres.  Devin will be unavailable over the weekend, requested TCC reach out to his brother Miguel Tanner #[phone] for questions/concerns/updates.

## 2023-04-21 NOTE — DOCUMENTATION QUERY
[facility]                                                                       Query Response Note      PATIENT:               MARISSA BASS  ACCT #:                  [id number]  MRN:                     [id number]  :                      1935  ADMIT DATE:       2023 7:07 PM  DISCH DATE:          RESPONDING  PROVIDER #:        [id number]           QUERY TEXT:    Based on the clinical indicators outlined above, please clarify if the following has been treated/evaluated during this hospitalization:    NOTE:  If an appropriate response is not listed below, please respond with a new note.    Thank you.    The patient's Clinical Indicators include:   CT Head: Right frontal parietal hemorrhages seen with surrounding vasogenic edema.   CT Head: Right frontoparietal hematoma.    Critical Care Note: Right sided intraparenchymal hemorrhage.    Risk factor: Intraparenchymal hemorrhage    Treatment: Keppra, Labetalol, critical care, frequent neuro checks    Thank you,  Rinku Underwood  Clinical   Connect via [employer]  Options provided:   -- Vasogenic cerebral edema   -- Other explanation, Please specify      Query created by: Rinku Underwood on 2023 7:57 AM    RESPONSE TEXT:    Other explanation- vasogenic edema was noted after admission and after my note was written.          Electronically signed by:  DEV MORRISON MD 2023 5:41 PM

## 2023-04-21 NOTE — CONSULTS
"Hospital Medicine Consultation    Date of Service  4/21/2023    Referring Physician  Randal Damian D.O.    Consulting Physician  Atul Christopher M.D.    Reason for Consultation  Intracranial hemorrhage    History of Presenting Illness  88 y.o. female who presented 4/19/2023 with stroke.  Ms. Tanner has a previous history of a traumatic intracranial hemorrhage in June 2021 due to amyloid angiopathy. She subsequently made a complete recovery and has been quite high functioning living at home alone and driving. On 4/19 she was  found on the floor by family with slurred speech and decreased level of consciousness. She was brought to the ER at [facility] where a CT of the head revealed a 4.5 cm x 3.5 cm right parietal, intraparenchymal hemorrhage. She was transferred to [facility] for neurosurgical evaluation and ICU admission.   Dr. Hong neurosurgery was consulted and recommended medical management. A CTA of the head was negative for vascular abnormalities. Neurology was consulted. She has goal systolic blood pressure of 100 to 130 and goal Na 135-145. She has worked with PT/OT and ST. She is on a dysphagia diet. Her left arm and leg remain weak.     4/21: Ms. Tanner was seen and evaluated in the ICU and again on the neuro floor. She detests the thickened liquids she has been offered and states they \"are vile\". Her extensive family is at bedside. We discussed that she is not a candidate for an NG tube. I let her know that, if she elects to eat the diet of her choice, she certainly has the right to make that decision but it would need to be in the context of accepting the risks of possible aspiration and DNR/DNI status which she also finds unacceptable. IV fluids initiated at 75 mL/hour.     Review of Systems  Review of Systems   Constitutional:  Negative for chills and fever.   Gastrointestinal:         Tolerating a dysphagia diet though she really doesn't like it   Neurological:         Left arm and " leg weakness   All other systems reviewed and are negative.    Past Medical History  Prior ICH 2022    Surgical History   has a past surgical history that includes eye surgery (2015).    Family History  family history includes Cancer in her brother, father, sister, and son; Heart Disease in her father; Hypertension in her mother; Other in her sister.    Social History   reports that she quit smoking about 56 years ago. Her smoking use included cigarettes. She has a 6.00 pack-year smoking history. She has never used smokeless tobacco. She reports that she does not drink alcohol and does not use drugs.    Medications  Prior to Admission Medications   Prescriptions Last Dose Informant Patient Reported? Taking?   Lutein 20 MG Cap UNK at UNK Family Member Yes No   Sig: Take  by mouth.   MAGNESIUM PO UNK at UNK Family Member Yes No   Sig: Take  by mouth.   Omega-3 Fatty Acids (FISH OIL) 1000 MG Cap capsule UNK at UNK Family Member Yes No   Sig: Take 1,000 mg by mouth 3 times a day, with meals.   TURMERIC PO UNK at UNK Family Member Yes No   Sig: Take  by mouth.   cyanocobalamin (VITAMIN B-12) 100 MCG Tab UNK at UNK Family Member Yes No   Sig: Take  by mouth.   simvastatin (ZOCOR) 10 MG Tab 3 weeks ago at stopped Family Member No No   Sig: Take 1 Tablet by mouth every evening.      Facility-Administered Medications: None       Allergies  No Known Allergies    Physical Exam  Temp:  [36.8 °C (98.3 °F)] 36.8 °C (98.3 °F)  Pulse:  [61-79] 70  Resp:  [11-32] 28  BP: (105-136)/(56-71) 113/57  SpO2:  [92 %-97 %] 97 %    Physical Exam  Vitals and nursing note reviewed.   Constitutional:       General: She is not in acute distress.  Cardiovascular:      Rate and Rhythm: Normal rate and regular rhythm.   Pulmonary:      Effort: Pulmonary effort is normal.      Breath sounds: Normal breath sounds.   Abdominal:      General: There is no distension.      Tenderness: There is no abdominal tenderness.   Musculoskeletal:      Cervical  back: Neck supple.      Right lower leg: No edema.      Left lower leg: No edema.   Neurological:      Mental Status: She is alert.      Comments: Quite sleepy  3+ left arm  4+ left leg  Left facial droop   Psychiatric:         Mood and Affect: Mood normal.         Behavior: Behavior normal.      Comments: Speech is clear       Fluids  Date 04/21/23 0700 - 04/22/23 0659   Shift 4480-4180 3910-7102 7549-0737 24 Hour Total   INTAKE   I.V. 166   166   Shift Total 166   166   OUTPUT   Urine 75   75   Shift Total 75   75   Weight (kg) 61.2 61.2 61.2 61.2       Laboratory  Recent Labs     04/19/23 1943 04/20/23 0204 04/21/23  0349   WBC 7.2 6.2 6.0   RBC 4.31 4.32 4.24   HEMOGLOBIN 13.0 13.0 12.8   HEMATOCRIT 37.7 37.5 37.8   MCV 87.5 86.8 89.2   MCH 30.2 30.1 30.2   MCHC 34.5 34.7 33.9   RDW 43.9 43.3 43.8   PLATELETCT 170 171 179   MPV 9.6 9.6 9.2     Recent Labs     04/19/23 1943 04/20/23  0204 04/20/23  0810 04/20/23 2027 04/21/23 0349 04/21/23  0828   SODIUM 139 138   < > 137 137 138   POTASSIUM 4.0 3.8  --   --  3.9  --    CHLORIDE 107 105  --   --  107  --    CO2 22 21  --   --  19*  --    GLUCOSE 96 95  --   --  87  --    BUN 12 12  --   --  12  --    CREATININE 0.57 0.52  --   --  0.49*  --    CALCIUM 8.8 8.6  --   --  8.4*  --     < > = values in this interval not displayed.     Recent Labs     04/19/23 1943   APTT 25.6   INR 0.98                 Imaging  CT-CTA HEAD WITH & W/O-POST PROCESS   Final Result         1.  No large vessel occlusion or aneurysm identified.   2.  Right frontal parenchymal hemorrhage with surrounding vasogenic edema, stable.   3.  Left frontal parenchymal hemorrhage with surrounding vasogenic edema, stable.   4.  Right parietal subarachnoid hemorrhage, stable.      DX-FOREARM LEFT   Final Result      No acute osseous abnormality.      DX-CHEST-LIMITED (1 VIEW)   Final Result      No acute cardiopulmonary abnormality.      OUTSIDE IMAGES-CT CERVICAL SPINE   Final Result      OUTSIDE  IMAGES-CT HEAD   Final Result      MR-BRAIN-WITH & W/O    (Results Pending)   MR-MRA HEAD-W/O    (Results Pending)       Assessment/Plan  * Intraparenchymal hemorrhage of brain (HCC)- (present on admission)  Assessment & Plan  This was spontaneous non-traumatic likely amyloid angiopathy  Status post admission to the ICU   Neurosurgery has determined her to not be a surgical candidate  She has left upper and lower extremity weakness and a facial droop. Dysphagia diet.  Blood pressure control goal less than 160  CTA negative for vascular abnormalities  PT/OT/ST rehab consulted    She has a history of a previous ICH in 2022    Vasogenic brain edema (HCC)- (present on admission)  Assessment & Plan  Noted on CT of the head.

## 2023-04-21 NOTE — THERAPY
"Speech Language Pathology   Cognitive Evaluation     Patient Name: Farhan Tanner  AGE:  88 y.o., SEX:  female  Medical Record #: [id number]  Date of Service: 4/21/2023      General Information  Vitals  O2 Delivery Device: None - Room Air  Level of Consciousness: Alert  Orientation: Oriented x 4  Follows Directives: Yes      Prior Living Situation & Level of Function  Prior Services: Home-Independent  Lives with - Patient's Self Care Capacity: Alone and Able to Care For Self  Education: Some College or Trade School  Communication: WNL  Swallowing: WNL    Subjective  RN cleared patient for cognitive evaluation. Patient with somewhat flat affect, but pleasant and cooperative for evaluation.      Communication Domain(s)  Expressive Language: WFL  Receptive Language: WFL  Cognitive-Linguistic: Moderate  Reading: Mild  Social/Pragmatic: WFL    Assessment  The patient was seen this date for a Cognitive evaluation. Patient was administered the Cognistat with a combination of other non-standard assessments. Patient was oriented in all spheres with the exception of reason: \"I hit my head.\"     Patient presented with moderate-severe deficits in medication management, moderate deficits in writing (grammatical errors and left neglect), and moderate deficits in clock drawing (numerical errors). Patient presents with left visual inattention, as she frequently missed the first 2-3 letters on left side of page during visual tracking task and did not initially see children on the stool when looking at Cookie Theft Picture. During both tasks, patient was able to attend to left side of page when verbally and visually cued.     Patient presented WNL across all other domains tested. However, d/t moderate deficits across a few domains, patient would benefit from cognitive therapy to address deficits, as well as intermittent supervision during the day and direct supervision for IADLs upon d/c home. She reports her children live nearby " and are supportive.     Cognistat  Orientation: Average  Attention: Average  Comprehension: Average  Repetition: Average  Naming: Average  Memory: Average  Calculations: Average  Similarities: Average  Judgement: Average      Clinical Impressions  Patient presents with moderate cognitive deficits in various domains. She would benefit from cognitive therapy to address deficits, as well as intermittent supervision during the day and direct supervision for IADLs upon d/c home.    NOTE: It is not within the scope of practice of Speech-Language Pathologists to determine patient capacity. Please defer to the physician or psych to complete this assessment.     Recommendations  Supervision Needs Upons Discharge: Intermittent supervision throughout the day and direct assistance with IADLs (see below)  IADLs: Medication management, Appointment management, Cooking    SLP Treatment Plan  Treatment Plan: Dysphagia Treatment, Cognitive Treatment  SLP Frequency: 4x Per Week  Estimated Duration: Until Therapy Goals Met    Anticipated Discharge Needs  Discharge Recommendations: Recommend post-acute placement for additional speech therapy services prior to discharge home  Therapy Recommendations Upon DC: Dysphagia Training, Reading Training, Writing Training, Cognitive-Linguistic Training, Patient / Family / Caregiver Education, Tracheostomy Training     Short Term Goal # 1: Pt will consume an LQ3/MT2 diet with stratgies and no overt s/sx of aspiration  Short Term Goal # 2: Patient will complete medication management tasks with 80% accuracy in 2/3 trials with minimal cues from SLP.  Short Term Goal # 3: Patient will attend to left side of page in 3/4 attempts with minimal verbal cues from SLP.    Belen Sawyer, SLP

## 2023-04-21 NOTE — CARE PLAN
The patient is Watcher - Medium risk of patient condition declining or worsening    Shift Goals  Clinical Goals: Frequent Neuro assessments  Patient Goals: Sleep  Family Goals: No Family Present    Progress made toward(s) clinical / shift goals:  Pt is alert and oriented, vitals stable on room air. Pt denies pain. Neuro status remains unchanged at this time- Left facial droop and Left arm weakness noted. No acute events at this time, will continue with plan of care.      Problem: Knowledge Deficit - Standard  Goal: Patient and family/care givers will demonstrate understanding of plan of care, disease process/condition, diagnostic tests and medications  Outcome: Progressing     Problem: Skin Integrity  Goal: Skin integrity is maintained or improved  Outcome: Progressing     Problem: Fall Risk  Goal: Patient will remain free from falls  Outcome: Progressing     Problem: Optimal Care of the Stroke Patient  Goal: Optimal emergency care for the stroke patient  Outcome: Progressing  Goal: Optimal acute care for the stroke patient  Outcome: Progressing     Problem: Knowledge Deficit - Stroke Education  Goal: Patient's knowledge of stroke and risk factors will improve  Outcome: Progressing     Problem: Psychosocial - Patient Condition  Goal: Patient's ability to verbalize feelings about condition will improve  Outcome: Progressing  Goal: Patient's ability to re-evaluate and adapt role responsibilities will improve  Outcome: Progressing     Problem: Neuro Status  Goal: Neuro status will remain stable or improve  Outcome: Progressing     Problem: Hemodynamic Monitoring  Goal: Patient's hemodynamics, fluid balance and neurologic status will be stable or improve  Outcome: Progressing     Problem: Respiratory - Stroke Patient  Goal: Patient will achieve/maintain optimum respiratory rate/effort  Outcome: Progressing     Problem: Dysphagia  Goal: Dysphagia will improve  Outcome: Progressing     Problem: Risk for Aspiration  Goal:  Patient's risk for aspiration will be absent or decrease  Outcome: Progressing     Problem: Urinary Elimination  Goal: Establish and maintain regular urinary output  Outcome: Progressing     Problem: Bowel Elimination  Goal: Establish and maintain regular bowel function  Outcome: Progressing     Problem: Mobility - Stroke  Goal: Patient's capacity to carry out activities will improve  Outcome: Progressing  Goal: Spasticity will be prevented or improved  Outcome: Progressing     Problem: Self Care  Goal: Patient will have the ability to perform ADLs independently or with assistance (bathe, groom, dress, toilet and feed)  Outcome: Progressing

## 2023-04-21 NOTE — THERAPY
Physical Therapy   Initial Evaluation     Patient Name: Farhan Tanner  Age:  88 y.o., Sex:  female  Medical Record #: [id number]  Today's Date: 4/21/2023     Precautions  Precautions: Fall Risk;Swallow Precautions  Comments: Catheter, L neglect    Assessment    Patient is 88 y.o. female who is a transfer from [place] with a right intraparenchymal hemorrhage affecting L face, arm, and leg. Pt seen for PT eval in bed asleep but was agreeable to participate in session. Pt presents with L sided neglect and R gaze preference. Pt Aox4, able to follow 1 step commands, and required frequent prompting/ redirection throughout session regarding placement of L UE. Pt frequently attempting to bear weight on dorsal surface of hand. Pt strength WDL on R LE and L LE 4/5. Pt shows L sided weakness with mobility, decreased L sided proprioception, and L sensation. Pt required heavy sequencing w/ all mobility and ModA. L knee buckling noted with initial standing and fatigue increasing fall risk. Pt lives alone but her 3 adult children live close by and are supportive. Pt would benefit from further intensive rehab before return to home. PT will continue to follow and progress as able. Plan to trial FWW vs. unilateral device depending on progression of L  strength.     Plan    Physical Therapy Initial Treatment Plan   Treatment Plan : Bed Mobility, Equipment, Gait Training, Neuro Re-Education / Balance, Self Care / Home Evaluation, Stair Training, Therapeutic Activities, Therapeutic Exercise  Treatment Frequency: 5 Times per Week  Duration: Until Therapy Goals Met    DC Equipment Recommendations: Unable to determine.   Discharge Recommendations: Recommend post-acute placement for additional physical therapy services prior to discharge home     Objective     04/21/23 1130   Precautions   Precautions Fall Risk;Swallow Precautions   Comments Catheter; L neglect   Vitals   O2 (LPM) 0   O2 Delivery Device None - Room Air   Vitals  Comments SPB <160 throughout session   Pain   Pain Scales 0 to 10 Scale    Intervention Ambulation / Increased Activity   Pain 0 - 10 Group   Comfort Goal Comfort with Movement;Perform Activity   Therapist Pain Assessment Post Activity Pain Same as Prior to Activity;Nurse Notified   Cognition    Cognition / Consciousness X   Speech/ Communication Dysarthric;Delayed Responses   Orientation Level Oriented x 4   Level of Consciousness Alert   Ability To Follow Commands 1 Step   Safety Awareness Impaired;Impulsive   New Learning Impaired   Attention Impaired   Sequencing Impaired   Comments L sided neglect present, R gaze preference; req frequent prompting   Passive ROM Lower Body   Passive ROM Lower Body WDL   Active ROM Lower Body    Active ROM Lower Body  WDL   Strength Lower Body   Lower Body Strength  X   Lt Hip Flexion Strength 4 (G)   Lt Knee Flexion Strength 4 (G)   Lt Knee Extension Strength 4 (G)   Comments R side 5/5   Sensation Lower Body   Lower Extremity Sensation   X   Lt Lower Extremity Light Touch Impaired   Lt Lower Extremity Proprioception Impaired   Comments R side sensation intact   Lower Body Muscle Tone   Lower Body Muscle Tone  X   Lt Lower Extremity Muscle Tone Hypertonic   Vision   Visual Field Cut Comments L eye peripheral vision impacted; R eue peripheral vision intact   Balance   Sitting Balance (Static) Fair -   Sitting Balance (Dynamic) Poor   Standing Balance (Static) Poor -   Standing Balance (Dynamic) Poor -   Weight Shift Sitting Fair   Weight Shift Standing Poor   Bed Mobility    Supine to Sit Moderate Assist   Scooting Moderate Assist   Rolling Moderate Assist to Rt.   Comments HOB elevated   Gait Analysis   Gait Level Of Assist Moderate Assist   Assistive Device Hand Held Assist  (2 person assist)   Distance (Feet) 5   # of Times Distance was Traveled 1   Deviation Decreased Base Of Support;Bradykinetic;Shuffled Gait;Decreased Heel Strike;Decreased Toe Off   Weight Bearing Status No  precautions   Comments L knee blocked to prevent buckling   Functional Mobility   Sit to Stand Moderate Assist   Bed, Chair, Wheelchair Transfer Moderate Assist   Transfer Method Stand Step   Comments L knee blocked to prevent buckling   ICU Target Mobility Level   ICU Mobility - Targeted Level Level 2   How much difficulty does the patient currently have...   Turning over in bed (including adjusting bedclothes, sheets and blankets)? 4   Sitting down on and standing up from a chair with arms (e.g., wheelchair, bedside commode, etc.) 2   Moving from lying on back to sitting on the side of the bed? 3   How much help from another person does the patient currently need...   Moving to and from a bed to a chair (including a wheelchair)? 2   Need to walk in a hospital room? 2   Climbing 3-5 steps with a railing? 1   6 clicks Mobility Score 14   Activity Tolerance   Sitting in Chair post-session  (transferred to w/c)   Sitting Edge of Bed 10 min   Standing 5 min   Patient / Family Goals    Patient / Family Goal #1 do the things I always do   Short Term Goals    Short Term Goal # 1 In 6 visits, pt will perform supine <> sit EOB w/ HOB flat and no use of rails for access to home bed.   Short Term Goal # 2 In 6 visits, pt will perform bed <> chair transfer w/ FWW and Victor M for safe functional mobility   Short Term Goal # 3 In 6 visits, pt will amb 50 ft w/ FWW and Victor M for safe mobility   Education Group   Education Provided Role of Physical Therapist   Role of Physical Therapist Patient Response Patient;Acceptance;Explanation;Verbal Demonstration   Physical Therapy Treatment Plan   Treatment Plan  Bed Mobility;Equipment;Gait Training;Neuro Re-Education / Balance;Self Care / Home Evaluation;Stair Training;Therapeutic Activities;Therapeutic Exercise   Treatment Frequency 5 Times per Week   Duration Until Therapy Goals Met   Anticipated Discharge Equipment and Recommendations   DC Equipment Recommendations Unable to determine at  this time.    Discharge Recommendations Recommend post-acute placement for additional physical therapy services prior to discharge home   Interdisciplinary Plan of Care Collaboration   IDT Collaboration with  Nursing;Occupational Therapist;Family / Caregiver   Patient Position at End of Therapy Seated;Family / Friend in Room  (tansferred to w/c)   Collaboration Comments RN updated   Session Information   Date / Session Number  4/21- 1 (1/5, 4/27)

## 2023-04-21 NOTE — PROGRESS NOTES
Family educated re: thickened liquids, risk for aspiration and pna, no straws. Family seems resistant to dietary restrictions.

## 2023-04-21 NOTE — THERAPY
Occupational Therapy   Initial Evaluation     Patient Name: Farhan Tanner  Age:  88 y.o., Sex:  female  Medical Record #: [id number]  Today's Date: 4/21/2023     Precautions: Fall Risk, Swallow Precautions  Comments: L inattention    Assessment    Patient is 88 y.o. female with h/o IPH (no residual deficits), found down by son with L-sided weakness, L facial droop. Pt dx with R IPH. Seen now for OT eval and treatment. See grid below for details. Pt is limited by: severe L inattention, LUE incoordination and proprioceptive deficit, balance impairment. Daughter present at end of session. Treatment included: education on L inattention and facilitatory techniques to promote attention to L (including tactile input to LUE). Pt is below functional baseline and will benefit from ongoing acute OT. Recommend physiatry consult.     Plan    Occupational Therapy Initial Treatment Plan   Treatment Interventions: Self Care / Activities of Daily Living, Adaptive Equipment, Cognitive Skill Development, Neuro Re-Education / Balance, Therapeutic Exercises, Therapeutic Activity, Sensory Integration Techniques  Treatment Frequency: 4 Times per Week  Duration: Until Therapy Goals Met    DC Equipment Recommendations: Unable to determine at this time  Discharge Recommendations: Recommend post-acute placement for additional occupational therapy services prior to discharge home      Objective       04/21/23 1203   Prior Living Situation   Prior Services None;Home-Independent   Housing / Facility 1 Story House   Steps Into Home 4   Steps In Home 0   Rail None   Bathroom Set up Walk In Shower   Equipment Owned None   Lives with - Patient's Self Care Capacity Alone and Able to Care For Self   Comments Pt lives alone and was independent PTA; per chart adult kids can support pt as needed on DC   Prior Level of ADL Function   Comments Pt was independent with BADL PTA   Prior Level of IADL Function   Comments Pt was independent with I-ADL and  functional mobility PTA   Cognition    Speech/ Communication Dysarthric   Orientation Level Oriented x 4   Level of Consciousness Alert   Ability To Follow Commands 1 Step   Safety Awareness Impaired;Impulsive   New Learning Impaired   Attention Impaired   Sequencing Impaired   Comments Pt pleasant and cooperative with severe L inattention, somewhat low arousal   Active ROM Upper Body   Dominant Hand Right   Comments LUE limited by inattention   Strength Upper Body   Comments LUE grossly 4-/5, inconsistent; inattention limits   Sensation Upper Body   Comments LUE impaired to light touch; profoundly impaired proprioception   Coordination Upper Body   Comments RUE grossly WNL; LUE non-functional due to inattention   Balance Assessment   Sitting Balance (Static) Fair -   Sitting Balance (Dynamic) Poor  (L lateral lean)   Standing Balance (Static) Poor   Standing Balance (Dynamic) Poor -   Weight Shift Sitting Fair   Weight Shift Standing Poor   Comments 2 person assist   Bed Mobility    Supine to Sit Moderate Assist   Sit to Supine   (up to chair post)   Scooting Minimal Assist  (seated)   Comments HOB elevated   ADL Assessment   Grooming Minimal Assist  (Face wipe with R hand)   Lower Body Dressing Maximal Assist  (assist to start socks over feet, but able to help pull past heels)   Toileting   (NT, declined need)   Functional Mobility   Sit to Stand Minimal Assist   Bed, Chair, Wheelchair Transfer Moderate Assist   Transfer Method Stand Step  (2-person assist)   Mobility Supine > EOB, short gait and transfer to transport chair   Visual Perception   Neglect Severe Left   Short Term Goals   Short Term Goal # 1 Pt will complete ADL transfers with SBA   Short Term Goal # 2 Pt will complete gown change with min A using deepika technique   Short Term Goal # 3 Pt will complete seated grooming with supv incorporating LUE as functional stabilizer

## 2023-04-21 NOTE — DISCHARGE PLANNING
Would appreciate therapy notes when medically appropriate.  TCC will follow for updates and medical clearance

## 2023-04-21 NOTE — PROGRESS NOTES
[id number] Daughter at . Pt a/o x4 gcs 15 converses appropriately, BETSY francisco see charting, no  needs at this time call bell in reach.

## 2023-04-21 NOTE — ASSESSMENT & PLAN NOTE
This was spontaneous non-traumatic likely amyloid angiopathy  Status post admission to the ICU   Neurosurgery has determined her to not be a surgical candidate  She has left upper and lower extremity weakness and a facial droop. Dysphagia diet.  Blood pressure control goal less than 160  CTA negative for vascular abnormalities  PT/OT/ST rehab consulted    She has a history of a previous ICH in 2022

## 2023-04-22 ENCOUNTER — HOSPITAL ENCOUNTER (INPATIENT)
Facility: REHABILITATION | Age: 88
LOS: 12 days | DRG: 056 | End: 2023-05-04
Attending: PHYSICAL MEDICINE & REHABILITATION | Admitting: PHYSICAL MEDICINE & REHABILITATION
Payer: MEDICARE

## 2023-04-22 VITALS
RESPIRATION RATE: 17 BRPM | TEMPERATURE: 97.9 F | BODY MASS INDEX: 23.91 KG/M2 | DIASTOLIC BLOOD PRESSURE: 71 MMHG | WEIGHT: 134.92 LBS | HEART RATE: 61 BPM | OXYGEN SATURATION: 96 % | HEIGHT: 63 IN | SYSTOLIC BLOOD PRESSURE: 132 MMHG

## 2023-04-22 DIAGNOSIS — J34.89 RHINORRHEA: ICD-10-CM

## 2023-04-22 DIAGNOSIS — I61.9 INTRAPARENCHYMAL HEMORRHAGE OF BRAIN (HCC): ICD-10-CM

## 2023-04-22 PROBLEM — I10 HYPERTENSION: Status: ACTIVE | Noted: 2023-04-22

## 2023-04-22 LAB
FLUAV RNA SPEC QL NAA+PROBE: NEGATIVE
FLUBV RNA SPEC QL NAA+PROBE: NEGATIVE
RSV RNA SPEC QL NAA+PROBE: NEGATIVE
SARS-COV-2 RNA RESP QL NAA+PROBE: NOTDETECTED
SPECIMEN SOURCE: NORMAL

## 2023-04-22 PROCEDURE — A9270 NON-COVERED ITEM OR SERVICE: HCPCS | Performed by: PHYSICAL MEDICINE & REHABILITATION

## 2023-04-22 PROCEDURE — 770010 HCHG ROOM/CARE - REHAB SEMI PRIVAT*

## 2023-04-22 PROCEDURE — 0241U HCHG SARS-COV-2 COVID-19 NFCT DS RESP RNA 4 TRGT MIC: CPT

## 2023-04-22 PROCEDURE — 94760 N-INVAS EAR/PLS OXIMETRY 1: CPT

## 2023-04-22 PROCEDURE — 99239 HOSP IP/OBS DSCHRG MGMT >30: CPT | Performed by: HOSPITALIST

## 2023-04-22 PROCEDURE — 700102 HCHG RX REV CODE 250 W/ 637 OVERRIDE(OP): Performed by: PHYSICAL MEDICINE & REHABILITATION

## 2023-04-22 PROCEDURE — 99223 1ST HOSP IP/OBS HIGH 75: CPT | Performed by: PHYSICAL MEDICINE & REHABILITATION

## 2023-04-22 RX ORDER — ACETAMINOPHEN 650 MG/1
650 SUPPOSITORY RECTAL EVERY 4 HOURS PRN
Status: DISCONTINUED | OUTPATIENT
Start: 2023-04-22 | End: 2023-05-04 | Stop reason: HOSPADM

## 2023-04-22 RX ORDER — AMOXICILLIN 250 MG
2 CAPSULE ORAL 2 TIMES DAILY
Status: CANCELLED | OUTPATIENT
Start: 2023-04-22

## 2023-04-22 RX ORDER — OMEPRAZOLE 20 MG/1
20 CAPSULE, DELAYED RELEASE ORAL DAILY
Status: CANCELLED | OUTPATIENT
Start: 2023-04-22

## 2023-04-22 RX ORDER — POLYETHYLENE GLYCOL 3350 17 G/17G
1 POWDER, FOR SOLUTION ORAL
Status: CANCELLED | OUTPATIENT
Start: 2023-04-22

## 2023-04-22 RX ORDER — BISACODYL 10 MG
10 SUPPOSITORY, RECTAL RECTAL
Status: CANCELLED | OUTPATIENT
Start: 2023-04-22

## 2023-04-22 RX ORDER — ACETAMINOPHEN 650 MG/1
650 SUPPOSITORY RECTAL EVERY 4 HOURS PRN
Status: CANCELLED | OUTPATIENT
Start: 2023-04-22

## 2023-04-22 RX ORDER — HYDRALAZINE HYDROCHLORIDE 10 MG/1
10 TABLET, FILM COATED ORAL 3 TIMES DAILY PRN
Status: DISCONTINUED | OUTPATIENT
Start: 2023-04-22 | End: 2023-05-04 | Stop reason: HOSPADM

## 2023-04-22 RX ORDER — BACITRACIN ZINC 500 [USP'U]/G
OINTMENT TOPICAL 2 TIMES DAILY
Status: CANCELLED | OUTPATIENT
Start: 2023-04-22 | End: 2023-04-23

## 2023-04-22 RX ORDER — BACITRACIN ZINC 500 [USP'U]/G
OINTMENT TOPICAL 2 TIMES DAILY
Status: COMPLETED | OUTPATIENT
Start: 2023-04-22 | End: 2023-04-22

## 2023-04-22 RX ORDER — AMOXICILLIN 250 MG
2 CAPSULE ORAL 2 TIMES DAILY
Status: DISCONTINUED | OUTPATIENT
Start: 2023-04-22 | End: 2023-05-04 | Stop reason: HOSPADM

## 2023-04-22 RX ORDER — POLYETHYLENE GLYCOL 3350 17 G/17G
1 POWDER, FOR SOLUTION ORAL
Status: DISCONTINUED | OUTPATIENT
Start: 2023-04-22 | End: 2023-05-04 | Stop reason: HOSPADM

## 2023-04-22 RX ORDER — ACETAMINOPHEN 325 MG/1
650 TABLET ORAL EVERY 4 HOURS PRN
Status: DISCONTINUED | OUTPATIENT
Start: 2023-04-22 | End: 2023-05-04 | Stop reason: HOSPADM

## 2023-04-22 RX ORDER — ACETAMINOPHEN 325 MG/1
650 TABLET ORAL EVERY 4 HOURS PRN
Status: CANCELLED | OUTPATIENT
Start: 2023-04-22

## 2023-04-22 RX ORDER — BISACODYL 10 MG
10 SUPPOSITORY, RECTAL RECTAL
Status: DISCONTINUED | OUTPATIENT
Start: 2023-04-22 | End: 2023-05-04 | Stop reason: HOSPADM

## 2023-04-22 RX ORDER — OMEPRAZOLE 20 MG/1
20 CAPSULE, DELAYED RELEASE ORAL DAILY
Status: DISCONTINUED | OUTPATIENT
Start: 2023-04-22 | End: 2023-05-04 | Stop reason: HOSPADM

## 2023-04-22 RX ADMIN — OMEPRAZOLE 20 MG: 20 CAPSULE, DELAYED RELEASE ORAL at 14:01

## 2023-04-22 RX ADMIN — BACITRACIN ZINC: 500 OINTMENT TOPICAL at 21:09

## 2023-04-22 RX ADMIN — SENNOSIDES AND DOCUSATE SODIUM 2 TABLET: 50; 8.6 TABLET ORAL at 14:02

## 2023-04-22 RX ADMIN — BACITRACIN ZINC: 500 OINTMENT TOPICAL at 06:00

## 2023-04-22 RX ADMIN — SENNOSIDES AND DOCUSATE SODIUM 2 TABLET: 50; 8.6 TABLET ORAL at 21:09

## 2023-04-22 RX ADMIN — BACITRACIN ZINC: 500 OINTMENT TOPICAL at 14:02

## 2023-04-22 ASSESSMENT — COGNITIVE AND FUNCTIONAL STATUS - GENERAL
DAILY ACTIVITIY SCORE: 13
PERSONAL GROOMING: A LOT
MOVING TO AND FROM BED TO CHAIR: A LITTLE
HELP NEEDED FOR BATHING: A LOT
SUGGESTED CMS G CODE MODIFIER MOBILITY: CL
SUGGESTED CMS G CODE MODIFIER DAILY ACTIVITY: CL
TURNING FROM BACK TO SIDE WHILE IN FLAT BAD: A LITTLE
WALKING IN HOSPITAL ROOM: A LOT
DRESSING REGULAR UPPER BODY CLOTHING: A LOT
MOBILITY SCORE: 13
CLIMB 3 TO 5 STEPS WITH RAILING: TOTAL
STANDING UP FROM CHAIR USING ARMS: A LOT
DRESSING REGULAR LOWER BODY CLOTHING: A LOT
MOVING FROM LYING ON BACK TO SITTING ON SIDE OF FLAT BED: A LOT
EATING MEALS: A LITTLE
TOILETING: A LOT

## 2023-04-22 ASSESSMENT — LIFESTYLE VARIABLES
EVER HAD A DRINK FIRST THING IN THE MORNING TO STEADY YOUR NERVES TO GET RID OF A HANGOVER: NO
EVER_SMOKED: YES
TOTAL SCORE: 0
EVER FELT BAD OR GUILTY ABOUT YOUR DRINKING: NO
HAVE YOU EVER FELT YOU SHOULD CUT DOWN ON YOUR DRINKING: NO
TOTAL SCORE: 0
HOW MANY TIMES IN THE PAST YEAR HAVE YOU HAD 5 OR MORE DRINKS IN A DAY: 0
TOTAL SCORE: 0
CONSUMPTION TOTAL: NEGATIVE
AVERAGE NUMBER OF DAYS PER WEEK YOU HAVE A DRINK CONTAINING ALCOHOL: 0
ALCOHOL_USE: NO
HAVE PEOPLE ANNOYED YOU BY CRITICIZING YOUR DRINKING: NO
ON A TYPICAL DAY WHEN YOU DRINK ALCOHOL HOW MANY DRINKS DO YOU HAVE: 0

## 2023-04-22 ASSESSMENT — PATIENT HEALTH QUESTIONNAIRE - PHQ9
2. FEELING DOWN, DEPRESSED, IRRITABLE, OR HOPELESS: NOT AT ALL
1. LITTLE INTEREST OR PLEASURE IN DOING THINGS: NOT AT ALL
2. FEELING DOWN, DEPRESSED, IRRITABLE, OR HOPELESS: NOT AT ALL
SUM OF ALL RESPONSES TO PHQ9 QUESTIONS 1 AND 2: 0
SUM OF ALL RESPONSES TO PHQ9 QUESTIONS 1 AND 2: 0
1. LITTLE INTEREST OR PLEASURE IN DOING THINGS: NOT AT ALL
2. FEELING DOWN, DEPRESSED, IRRITABLE, OR HOPELESS: NOT AT ALL
SUM OF ALL RESPONSES TO PHQ9 QUESTIONS 1 AND 2: 0
1. LITTLE INTEREST OR PLEASURE IN DOING THINGS: NOT AT ALL

## 2023-04-22 ASSESSMENT — FIBROSIS 4 INDEX: FIB4 SCORE: 2.41

## 2023-04-22 NOTE — CARE PLAN
The patient is Stable - Low risk of patient condition declining or worsening    Shift Goals  Clinical Goals: rest, dc, void  Patient Goals: dc  Family Goals: kiet    Progress made toward(s) clinical / shift goals:    Problem: Knowledge Deficit - Standard  Goal: Patient and family/care givers will demonstrate understanding of plan of care, disease process/condition, diagnostic tests and medications  Outcome: Progressing     Problem: Skin Integrity  Goal: Skin integrity is maintained or improved  Outcome: Progressing     Problem: Fall Risk  Goal: Patient will remain free from falls  Outcome: Progressing     Problem: Optimal Care of the Stroke Patient  Goal: Optimal emergency care for the stroke patient  Outcome: Progressing     Problem: Discharge Planning - Stroke  Goal: Ensure Stroke Core Measures are met prior to discharge  Outcome: Progressing     Problem: Neuro Status  Goal: Neuro status will remain stable or improve  Outcome: Progressing     Problem: Hemodynamic Monitoring  Goal: Patient's hemodynamics, fluid balance and neurologic status will be stable or improve  Outcome: Progressing     Problem: Respiratory - Stroke Patient  Goal: Patient will achieve/maintain optimum respiratory rate/effort  Outcome: Progressing     Problem: Mobility - Stroke  Goal: Patient's capacity to carry out activities will improve  Outcome: Progressing     Problem: Self Care  Goal: Patient will have the ability to perform ADLs independently or with assistance (bathe, groom, dress, toilet and feed)  Outcome: Progressing       Patient is not progressing towards the following goals:

## 2023-04-22 NOTE — CARE PLAN
"  Problem: Knowledge Deficit - Standard  Goal: Patient and family/care givers will demonstrate understanding of plan of care, disease process/condition, diagnostic tests and medications  4/22/2023 1639 by Shilpa Wild R.N.  Outcome: Not Progressing  Patient admitted to rehab unit today and verbalized understanding safety education including call light usage. Patient became impulsive and self transferred to bathroom; alarms heard and staff was able to intervene and assist patient safely.            Problem: Fall Risk - Rehab  Goal: Patient will remain free from falls  Outcome: Progressing   Siria Concepcion Fall risk Assessment Score: 18    High fall risk Interventions   - Alarming seatbelt  - Wander guard  - Bed and strip alarm   - Yellow sign by the door   - Yellow wrist band \"Fall risk\"  - Room near to the nurse station  - Do not leave patient unattended in the bathroom  - Fall risk education provided                "

## 2023-04-22 NOTE — CARE PLAN
4 Eyes Skin Assessment Completed by this RN and KELSIE Peres.    Head WDL  Ears WDL  Nose WDL  Mouth WDL  Neck WDL  Breast/Chest WDL  Shoulder Blades WDL  Spine WDL  (R) Arm/Elbow/Hand Bruising  (L) Arm/Elbow/Hand Abrasion and bruising.  Abdomen WDL  Groin WDL  Scrotum/Coccyx/Buttocks WDL  (R) Leg WDL  (L) Leg WDL  (R) Heel/Foot/Toe WDL  (L) Heel/Foot/Toe WDL          Devices In Places Blood Pressure Cuff      Interventions In Place Pillows    Possible Skin Injury No    Pictures Uploaded Into Epic Yes  Wound Consult Placed N/A  RN Wound Prevention Protocol Ordered No

## 2023-04-22 NOTE — H&P
Physical Medicine & Rehabilitation  History and Physical (H&P)  &     Post Admission Physician Evaluation (LONNIE)       Date of Admission: 4/22/2023  Date of Service: 4/22/2023   Farhan Tanner  [id number]/02    [facility] Code to Support Admission: 0001.1 - Stroke: Left Body Involvement (Right Brain)  Etiologic Diagnosis: Intraparenchymal hemorrhage of brain (HCC)    _______________________________________________    Chief Complaint: Right intraparenchymal hemorrhage    History of Present Illness:  The patient is a 88 y.o. right hand dominant female with a past medical history of left sided hemorrhage in 2021;  who presented on 4/19/2023  7:07 PM as a transfer from [facility] with advanced imaging showing 5 x 4 cm right intraparenchymal hematoma.  Patient was found on the floor by her son with left facial droop.  Patient came to check on her after he called and patient reported that she cannot move her left side.  Patient was initially brought to [facility] and then transferred to [facility] for neurosurgical evaluation.  Patient was seen by neurology and found to have NIH score of 5.  CTA head today shows large right frontal parenchymal hemorrhage and left frontal parenchymal hemorrhage, both with surrounding vasogenic edema.  Patient has left visual field cut    On arrival to [facility] rehab, patient reports the right ovary was fine.  Patient is able to orient to [facility] hospital.  Patient continues to have left side neglect.  Patient is impulsive per nursing.  Patient has a skin tear on her left arm.      Review of Systems:     Pertinent positives are mentioned in the HPI, all others reviewed and are negative.       Past Medical History:  No past medical history on file.    Past Surgical History:  Past Surgical History:   Procedure Laterality Date    EYE SURGERY  2015    cataracts       Family History:  Family History   Problem Relation Age of Onset    Hypertension Mother     Cancer Father     Heart Disease  Father     Other Sister         myasthenia gravis    Cancer Brother     Cancer Sister         pancreatic    Cancer Son         melanoma       Medications:  Current Facility-Administered Medications   Medication Dose    acetaminophen (Tylenol) tablet 650 mg  650 mg    Or    acetaminophen (TYLENOL) suppository 650 mg  650 mg    bacitracin ointment      Respiratory Therapy Consult      Pharmacy Consult Request ...Pain Management Review 1 Each  1 Each    omeprazole (PRILOSEC) capsule 20 mg  20 mg    senna-docusate (PERICOLACE or SENOKOT S) 8.6-50 MG per tablet 2 Tablet  2 Tablet    And    polyethylene glycol/lytes (MIRALAX) PACKET 1 Packet  1 Packet    And    magnesium hydroxide (MILK OF MAGNESIA) suspension 30 mL  30 mL    And    bisacodyl (DULCOLAX) suppository 10 mg  10 mg    hydrALAZINE (APRESOLINE) tablet 10 mg  10 mg       Allergies:  Patient has no known allergies.    Psychosocial History:  Living Site:  Home  Living With:  self  Caregiver's availability: Patient will have family support from her children  Number of stairs:  1  Substance use history: None    Level of Function Prior to Disability:  Previously independent    Level of Function Prior to Admission to [facility]:  Physical Therapy: Prior Services: Home-Independent  Housing / Facility: [address] [place] [address] House  Steps Into Home: 4  Steps In Home: 0  Rail: None  Bathroom Set up: Walk In Shower  Equipment Owned: None  Lives with - Patient's Self Care Capacity: Alone and Able to Care For Self  Bed Mobility: Independent  Transfer Status: Independent  Ambulation: Independent  Assistive Devices Used: None  Stairs: Independent  Current Level of Function:   Gait Level Of Assist: Moderate Assist  Assistive Device: Hand Held Assist (2 person assist)  Distance (Feet): 5  Deviation: Decreased Base Of Support, Bradykinetic, Shuffled Gait, Decreased Heel Strike, Decreased Toe Off  Weight Bearing Status: No precautions  Supine to Sit: Moderate Assist  Sit to  "Supine:  (up to chair post)  Scooting: Minimal Assist (seated)  Rolling: Moderate Assist to Rt.  Comments: HOB elevated  Sit to Stand: Minimal Assist  Bed, Chair, Wheelchair Transfer: Moderate Assist  Transfer Method: Stand Step (2-person assist)  Sitting in Chair: >10 min; up post  Sitting Edge of Bed: 8 min  Standing: 3-4 min  Occupational Therapy:   Self Feeding: Independent  Grooming / Hygiene: Independent  Bathing: Independent  Dressing: Independent  Toileting: Independent  Medication Management: Independent  Laundry: Independent  Kitchen Mobility: Independent  Finances: Independent  Home Management: Independent  Shopping: Independent  Prior Level Of Mobility: Independent Without Device in Community, Independent Without Device in Home  Driving / Transportation: Driving Independent  Prior Services: Home-Independent  Housing / Facility: [address]  Current Level of Function:   Lower Body Dressing: Maximal Assist (assist to start socks over feet, but able to help pull past heels)  Toileting:  (NT, declined need)  Speech Language Pathology:      Rehabilitation Prognosis/Potential: Good  Estimated Length of Stay: 14-21 days       CURRENT LEVEL OF FUNCTION:   Same as level of function prior to admission to [facility]    Physical Examination:     VITAL SIGNS:   height is 1.6 m (5' 3\") and weight is 63 kg (138 lb 14.2 oz). Her oral temperature is 36.8 °C (98.3 °F). Her blood pressure is 114/66 and her pulse is 78. Her respiration is 16 and oxygen saturation is 94%.   Gen: NAD  Head: NC/AT  Eyes/ Nose/ Mouth: moist mucous membranes  Cardio: RRR, good distal perfusion, warm extremities  Pulm: normal respiratory effort, no cyanosis   Abd: Soft NTND, negative borborygmi   Ext: No peripheral edema. No calf tenderness. No clubbing.     Mental status: follows commands  Speech: mild dysarthria      CRANIAL NERVES:  2,3: Left VF cut   3,4,6: EOMI bilaterally, no nystagmus or diplopia  5: sensation intact " to light touch bilaterally and symmetric  7: left facial droop   8: hearing grossly intact        Motor:                            Upper Extremity  Myotome R L   Shoulder flexion C5 5 4/5   Elbow flexion C5 5 4/5   Wrist extension C6 5 4/5   Elbow extension C7 5 4/5   Finger flexion C8 5 3/5   Finger abduction T1 5 2/5      Lower Extremity Myotome R L   Hip flexion L2 5 4/5   Knee extension L3 5 4/5   Ankle dorsiflexion L4 5 5   Toe extension L5 5 5   Ankle plantarflexion S1 5 5            Sensory:   intact to light touch through out    Radiology:  CTA head 4/20/2023  1.  No large vessel occlusion or aneurysm identified.  2.  Right frontal parenchymal hemorrhage with surrounding vasogenic edema, stable.  3.  Left frontal parenchymal hemorrhage with surrounding vasogenic edema, stable.  4.  Right parietal subarachnoid hemorrhage, stable.    Laboratory Values:  Recent Labs     04/19/23 1943 04/20/23 0204 04/20/23 0810 04/20/23 2027 04/21/23 0349 04/21/23  0828   SODIUM 139 138   < > 137 137 138   POTASSIUM 4.0 3.8  --   --  3.9  --    CHLORIDE 107 105  --   --  107  --    CO2 22 21  --   --  19*  --    GLUCOSE 96 95  --   --  87  --    BUN 12 12  --   --  12  --    CREATININE 0.57 0.52  --   --  0.49*  --    CALCIUM 8.8 8.6  --   --  8.4*  --     < > = values in this interval not displayed.     Recent Labs     04/19/23 1943 04/20/23 0204 04/21/23 0349   WBC 7.2 6.2 6.0   RBC 4.31 4.32 4.24   HEMOGLOBIN 13.0 13.0 12.8   HEMATOCRIT 37.7 37.5 37.8   MCV 87.5 86.8 89.2   MCH 30.2 30.1 30.2   MCHC 34.5 34.7 33.9   RDW 43.9 43.3 43.8   PLATELETCT 170 171 179   MPV 9.6 9.6 9.2     Recent Labs     04/19/23 1943   APTT 25.6   INR 0.98       Primary Rehabilitation Diagnosis:    This patient is a 88 y.o. female admitted for acute inpatient rehabilitation with   Intraparenchymal hemorrhage of brain (HCC).    Impairments:   Cognitive  ADLs/IADLs  Mobility  Speech    Secondary Diagnosis/Medical Co-morbidities Affecting  Function  Lethargy, stroke, hypertension    Relevant Changes Since Preadmission Evaluation:    Status unchanged    The patient's rehabilitation potential is Good  The patient's medical prognosis is good    Rehabilitation Plan:   Discussion and Recommendations:   1. The patient requires an acute inpatient rehabilitation program with a coordinated program of care at an intensity and frequency not available at a lower level of care. This recommendation is substantiated by the patient's medical physicians who recommend that the patient's intervention and assessment of medical issues needs to be done at an acute level of care for patient's safety and maximum outcome.   2. A coordinated program of care will be supplied by an interdisciplinary team of physical therapy, occupational therapy, rehab physician, rehab nursing, and, if needed, speech therapy and rehab psychology. Rehab team presents a patient-specific rehabilitation and education program concentrating on prevention of future problems related to accessibility, mobility, skin, bowel, bladder, sexuality, and psychosocial and medical/surgical problems.   3. Need for Rehabilitation Physician: The rehab physician will be evaluating the patient on a multi-weekly basis to help coordinate the program of care. The rehab physician communicates between medical physicians, therapists, and nurses to maximize the patient's potential outcome. Specific areas in which the rehab physician will be providing daily assessment include the following:   A. Assessing the patient's heart rate and blood pressure response (vitals monitoring) to activity and making adjustments in medications or conservative measures as needed.   B. The rehab physician will be assessing the frequency at which the program can be increased to allow the patient to reach optimal functional outcome.   C. The rehab physician will also provide assessments in daily skin care, especially in light of patient's  impairments in mobility.   D. The rehab physician will provide special expertise in understanding how to work with functional impairment and recommend appropriate interventions, compensatory techniques, and education that will facilitate the patient's outcome.   4. Rehab R.N.   The rehab RN will be working with patient to carry over in room mobility and activities of daily living when the patient is not in 3 hours of skilled therapy. Rehab nursing will be working in conjunction with rehab physician to address all the medical issues above and continue to assess laboratory work and discuss abnormalities with the treating physicians, assess vitals, and response to activity, and discuss and report abnormalities with the rehab physician. Rehab RN will also continue daily skin care, supervise bladder/bowel program, instruct in medication administration, and ensure patient safety.   5. Rehab Therapy: Therapies to treat at intensity and frequency of (may change after completion of evaluation by all therapeutic disciplines):       PT:  Physical therapy to address mobility, transfer, gait training and evaluation for adaptive equipment needs 1 hour/day at least 5 days/week for the duration of the ELOS (see below)       OT:  Occupational therapy to address ADLs, self-care, home management training, functional mobility/transfers and assistive device evaluation, and community re-integration 1  hour/day at least 5 days/week for the duration of the ELOS (see below).        ST/Dysphagia:  Speech therapy to address speech, language, and cognitive deficits as well as swallowing difficulties with retraining/dysphagia management and community re-integration with comprehension, expression, cognitive training 1  hour/day at least 5 days/week for the duration of the ELOS (see below).     Medical management / Rehabilitation Issues/ Adverse Potential as part of rehabilitation plan     Rehabilitation Issues/Adverse Potential  1.  CVA  (Cerebrovascular Accident): Cont SCDs for secondary prophylaxis as well as lipid and blood pressure management. Patient demonstrates functional deficits in strength, balance, coordination, and ADL's. Patient is admitted to [facility] for comprehensive rehabilitation therapy as described below.   Rehabilitation nursing monitors bowel and bladder control, educates on medication administration, co-morbidities and monitors patient safety.    2.  Neurostimulants: None at this time but continue to assess daily for need to initiate should status change.    3.  DVT prophylaxis:  Patient is on SCDs for anticoagulation upon transfer. Encourage OOB. Monitor daily for signs and symptoms of DVT including but not limited to swelling and pain to prevent the development of DVT that may interfere with therapies.    4.  GI prophylaxis:  On prilosec to prevent gastritis/dyspepsia which may interfere with therapies.    5.  Pain: No issues with pain currently / Controlled with Tylenol    6.  Nutrition/Dysphagia: Dietician monitors nutrient intake, recommend supplements prn and provide nutrition education to pt/family to promote optimal nutrition for wound healing/recovery.     7.  Bladder/bowel:  Start bowel and bladder program as described below, to prevent constipation, urinary retention (which may lead to UTI), and urinary incontinence (which will impact upon pt's functional independence).   - TV Q3h while awake with post void bladder scans, I&O cath for PVRs >400  - up to commode after meal     8.  Skin/dermal ulcer prophylaxis: Monitor for new skin conditions with q.2 h. turns as required to prevent the development of skin breakdown.     9.  Cognition/Behavior: As needed psychologist provides adjustment counseling to illness and psychosocial barriers that may be potential barriers to rehabilitation.     10. Respiratory therapy: RT performs O2 management prn, breathing retraining, pulmonary hygiene and  bronchospasm management prn to optimize participation in therapies.     Medical Co-Morbidities/Adverse Potential Affecting Function:    Right intraparenchymal hemorrhage   -Etiology of stroke is cerebral amyloid angiopathy  -Patient has been followed by neurology, neurosurgery and hospital medicine at the acute hospital  -Strict BP goal 100-130 systolic long-term  -SCDs only for DVT Ppx  -MRI brain pending  -Admit to [facility] for aggressive rehabilitation with PT and OT for mobility and ADLs.  SLP for cog, speech and swallow deficits.  Per guidelines, 15 hours per week between PT, OT and SLP.  -Follow-up with stroke neurology    Seizure prophylaxis  -Keppra 500 mg twice daily for 7 days    Pain  -Tylenol 650 mg every 4 hours as needed    GI Ppx  -Omeprazole 20 mg daily    Hypertension   -Strict BP goal 100-130 systolic  - Hydralazine 3 times daily as needed for pressures over 140 systolic     DVT PPx: SCDs only    I personally performed a complete drug regimen review and no potential clinically significant medication issues were identified.     Goals/Expected Level of Function Based on Current Medical and Functional Status:  (may change based on patient's medical status and rate of impairment recovery)  Transfers:   Modified Independent  Mobility/Gait:   Modified Independent  ADL's:   Modified Independent    DISPOSITION: Discharge to pre-morbid independent living setting with the supportive care of patient's family.    ELOS: 10-14 days  ____________________________________    José Miguel Davis DO MS  [facility] - Physical Medicine & Rehabilitation   ____________________________________    Pt was seen today for 75 min, and entire time spent in face-to-face contact was >50% in counseling and coordination of care as detailed in A/P above.

## 2023-04-22 NOTE — PROGRESS NOTES
Patient admitted to facility at 1210 via medical transport; accompanied by hospital transport.  Patient assisted to room and positioned in bed for comfort and safety; call light within reach.  Patient assisted with stowing belongings and oriented to room and facility.  Admission assessment performed and documented in computer.  Admission paperwork completed; signed copies placed in chart.  Will continue to monitor.

## 2023-04-22 NOTE — CARE PLAN
The patient is Stable - Low risk of patient condition declining or worsening    Shift Goals  Clinical Goals: Monitor neuro status  Patient Goals: Sleep  Family Goals: kiet    Progress made toward(s) clinical / shift goals:      Problem: Neuro Status  Goal: Neuro status will remain stable or improve  Outcome: Progressing    Q4H neuro checks in place. Pt's neurological status (A/Ox4) remains unchanged throughout shift. Bed alarm is on, call light within reach.     Patient is not progressing towards the following goals:

## 2023-04-22 NOTE — DISCHARGE SUMMARY
"Discharge Summary    CHIEF COMPLAINT ON ADMISSION  Chief Complaint   Patient presents with    Trauma Green       Reason for Admission  trauma green transfer 88F [facility]      Admission Date  4/19/2023    CODE STATUS  Full Code    HPI & HOSPITAL COURSE  This is a 88 y.o. female here with Intracranial hemorrhage  Ms. Tanner has a previous history of a traumatic intracranial hemorrhage in June 2021 due to amyloid angiopathy. She subsequently made a complete recovery and has been quite high functioning living at home alone and driving. On 4/19 she was  found on the floor by family with slurred speech and decreased level of consciousness. She was brought to the ER at [facility] where a CT of the head revealed a 4.5 cm x 3.5 cm right parietal, intraparenchymal hemorrhage. She was transferred to [facility] for neurosurgical evaluation and ICU admission.   Dr. Hong neurosurgery was consulted and recommended medical management. A CTA of the head was negative for vascular abnormalities. Neurology was consulted. She has goal systolic blood pressure of 100 to 130 and goal Na 135-145. She has worked with PT/OT and ST. She is on a dysphagia diet. Her left arm and leg remain weak.      4/21: Ms. Tanner was seen and evaluated in the ICU and again on the neuro floor. She detests the thickened liquids she has been offered and states they \"are vile\". Her extensive family is at bedside. We discussed that she is not a candidate for an NG tube. I let her know that, if she elects to eat the diet of her choice, she certainly has the right to make that decision but it would need to be in the context of accepting the risks of possible aspiration and DNR/DNI status which she also finds unacceptable. IV fluids initiated at 75 mL/hour.    4/22: Ms. Tanner had an uneventful night. I spoke with her night nurse and she confirms no problems. She has been accepted to [facility]. Her blood pressure remains in the normal range. Wound care to her " arms due to bruising and abrasions.    Therefore, she is discharged in good and stable condition to an inpatient rehabilitation hospital.    The patient met 2-midnight criteria for an inpatient stay at the time of discharge.    Discharge Date  4/22    FOLLOW UP ITEMS POST DISCHARGE  Inpatient Rehab    DISCHARGE DIAGNOSES  Principal Problem:    Intraparenchymal hemorrhage of brain (HCC) POA: Yes  Active Problems:    Vasogenic brain edema (HCC) POA: Yes    Trauma POA: Yes  Resolved Problems:    * No resolved hospital problems. *      FOLLOW UP  Future Appointments   Date Time Provider Department Center   6/28/2023  9:20 AM Pretty Kirk D.O. [facility]     No follow-up provider specified.    MEDICATIONS ON DISCHARGE     Medication List        CONTINUE taking these medications        Instructions   simvastatin 10 MG Tabs  Commonly known as: ZOCOR   Take 1 Tablet by mouth every evening.  Dose: 10 mg            STOP taking these medications      cyanocobalamin 100 MCG Tabs  Commonly known as: VITAMIN B-12     fish oil 1000 MG Caps capsule     Lutein 20 MG Caps     MAGNESIUM PO     TURMERIC PO              Allergies  No Known Allergies    DIET  Orders Placed This Encounter   Procedures    Diet Order Diet: Level 3 - Liquidised; Liquid level: Level 2 - Mildly Thick; Tray Modifications (optional): No Straws, SLP - Deliver to Nursing Station, SLP - 1:1 Supervision by Nursing     Standing Status:   Standing     Number of Occurrences:   1     Order Specific Question:   Diet:     Answer:   Level 3 - Liquidised [26]     Order Specific Question:   Liquid level     Answer:   Level 2 - Mildly Thick     Order Specific Question:   Tray Modifications (optional)     Answer:   No Straws     Order Specific Question:   Tray Modifications (optional)     Answer:   SLP - Deliver to Nursing Station     Order Specific Question:   Tray Modifications (optional)     Answer:   SLP - 1:1 Supervision by Nursing       ACTIVITY  As  tolerated.      CONSULTATIONS  Neurosurgery  Neuro  Critical care    PROCEDURES  FEES:     Clinical Impressions  The pt presents with a moderate oropharyngeal dysphagia. This is acute change from baseline s/p R hemorrhage. Recommend careful initiation of a liquidized/MTL diet with compensatory strategies.         Recommendations  Diet Consistency: liquidized/MTL  Medication: Crush with applesauce, as appropriate  Supervision: 1:1 feeding with constant supervision  Positioning: Fully upright and midline during oral intake  Strategies: Small bites/sips, Alternate bites and sips, Multiple swallows (2-3) per bite/sips  Oral Care: Q2h  Additional Instrumentation: Repeat diagnostic study when clinically appropriate          LABORATORY  Lab Results   Component Value Date    SODIUM 138 04/21/2023    POTASSIUM 3.9 04/21/2023    CHLORIDE 107 04/21/2023    CO2 19 (L) 04/21/2023    GLUCOSE 87 04/21/2023    BUN 12 04/21/2023    CREATININE 0.49 (L) 04/21/2023    GLOMRATE 93 12/24/2021        Lab Results   Component Value Date    WBC 6.0 04/21/2023    HEMOGLOBIN 12.8 04/21/2023    HEMATOCRIT 37.8 04/21/2023    PLATELETCT 179 04/21/2023    CT head:  IMPRESSION:        4.7 x 3.3 right frontoparietal hematoma without significant midline shift.     Left frontal lobe encephalomalacia with focal areas of hyperdensity likely represent overlying blood products.     Small left frontoparietal convexity subdural blood collection    Total time of the discharge process exceeds 35 minutes.

## 2023-04-22 NOTE — FLOWSHEET NOTE
04/22/23 1208   Events/Summary/Plan   Events/Summary/Plan RT Consult   Vital Signs   Pulse 80   Respiration 16   Pulse Oximetry 94 %   $ Pulse Oximetry (Spot Check) Yes   Respiratory Assessment   Level of Consciousness Alert   Chest Exam   Work Of Breathing / Effort Within Normal Limits   Breath Sounds   RUL Breath Sounds Clear   RML Breath Sounds Clear   RLL Breath Sounds Diminished   MIKAYLA Breath Sounds Clear   LLL Breath Sounds Diminished   Oxygen   O2 Delivery Device Room air w/o2 available

## 2023-04-22 NOTE — PROGRESS NOTES
4 Eyes Skin Assessment Completed by KELSIE Santillan and KELSIE Silva.    Head WDL  Ears WDL  Nose WDL  Mouth WDL  Neck WDL  Breast/Chest WDL  Shoulder Blades WDL  Spine WDL  (R) Arm/Elbow/Hand scattered bruising, much less than L  (L) Arm/Elbow/Hand Bruising abrasion small lac w redness  Abdomen WDL  Groin WDL  Scrotum/Coccyx/Buttocks Redness and Blanching  (R) Leg WDL  (L) Leg WDL  (R) Heel/Foot/Toe WDL  (L) Heel/Foot/Toe WDL          Devices In Places Oro and SCD's      Interventions In Place Pillows    Possible Skin Injury No    Pictures Uploaded Into Epic N/A  Wound Consult Placed N/A  RN Wound Prevention Protocol Ordered No

## 2023-04-22 NOTE — DISCHARGE PLANNING
[facility] Transitional Care Coordination    GMT transport to [facility] today.  Nursing to call report to [phone].  Volate message to bedside RN Roberta.  Chart reflects COBRA complete.    Attempted to reach son Miguel by phone to confirm pending transfer.  No answer.  Left voice message to include [facility] contact information and [facility] Nurses station number for updates to inform visitation.      [facility] will follow to assist as need with transition to [facility].

## 2023-04-22 NOTE — PREADMISSION SCREENING NOTE
Pre-Admission Screening Form    Patient Information:   Name: Farhan Tanner     MRN: [id number]       : 1935      Age: 88 y.o.   Gender: female      Race: White [7]       Marital Status:  [5]  Family Contact: Devin Colindres Melinda        Relationship: Son [15]  Daughter [2]  Home Phone: [phone]           Cell Phone:     Advanced Directives:  Yes  Code Status:  FULL  Current Attending Provider: Atul Christopher M.D.  Referring Physician: Dr. Roberta Hernandez      Physiatrist Consult: Dr. José Miguel Davis        Referral Date: 2023  Primary Payor Source:  MEDICARE  Secondary Payor Source:  [facility]    Medical Information:   Date of Admission to [facility]  Room Number: S197/02  Rehabilitation Diagnosis: 0001.1 - Stroke: Left Body Involvement (Right Brain)    There is no immunization history on file for this patient.  No Known Allergies  History reviewed. No pertinent past medical history.  Past Surgical History:   Procedure Laterality Date    EYE SURGERY      cataracts       History Leading to Admission, Conditions that Caused the Need for Rehab (CMS):       Roberta Hernandez D.O.  Physician  Critical Care  Consults     Signed  Date of Service:  2023  9:42 PM    Critical Care Consultation     Date of consult: 2023     Referring Physician  Justino Webb M.D.     Reason for Consultation  Right sided intraparenchymal hemorrhage     History of Presenting Illness  88 y.o. female who presented 2023 after being found down by family. Her son reports that he called her today and he could tell something was wrong and he went to see her. She had a left sided facial droop and her left upper extremity was weak. He said that she was able to speak, but her speech was slurred. He called EMS and she was brought to [facility] and CT head showed a right intraparenchymal hemorrhage and she was transferred to [facility] for neurosurgical care.  Neurosurgery, Dr Hong, was called by the ERP who recommended keppra, BP<160, and CTA in 6 hours.      Patient was admitted from 6/21/21 to 6/23/21 with a left sided hemorrhagic lesion which appeared to be a mass, but after her MRI with stealth protocol, the mass was more likely to be a hematoma. She was followed with MRIs which showed resolution of the area.      In the ICU, patient was very sleepy, but arousable. She denied pain and reports that she does not remember what happened today.      Code Status  Prior     Review of Systems   Constitutional:  Negative for chills and fever.   Respiratory:  Negative for cough.    Cardiovascular:  Negative for chest pain.   Gastrointestinal:  Negative for nausea and vomiting.   Neurological:  Positive for focal weakness and weakness.      Past Medical History  Left sided intraparenchymal hemorrhage in 2021     Surgical History   has a past surgical history that includes eye surgery (2015).     Family History  family history includes Cancer in her brother, father, sister, and son; Heart Disease in her father; Hypertension in her mother; Other in her sister.     Social History   reports that she quit smoking about 56 years ago. Her smoking use included cigarettes. She has a 6.00 pack-year smoking history. She has never used smokeless tobacco. She reports that she does not drink alcohol and does not use drugs.     * Intraparenchymal hemorrhage of brain (HCC)- (present on admission)  Assessment & Plan  Right sided intraparenchymal hemorrhage, no sign of head trauma, but was found on the floor   Concern for amyloid angiopathy as this is her second, non-hypertensive bleed      Admit to ICU   Neurosurgery consulted and recs keppra and neurochecks  Repeat CT with angio around 0100   Follow up MRI   BP goal <160   Neurology consulted      Trauma- (present on admission)  Assessment & Plan  Reported pain in her left arm XR negative         Discussed patient condition and risk of  morbidity and/or mortality with Family, RN, and Patient.       The patient remains critically ill.  Critical care time = 42 minutes in directly providing and coordinating critical care and extensive data review.  No time overlap and excludes procedures.     Roberta Hernandez DO   Pulmonary and Critical Care              Miguel Angel San M.D.  Physician  Neurology  Consults     Signed  Date of Service:  4/20/2023  7:17 AM    Neurology initial consultation note  Neurohospitalist Service, [facility]     Referring Physician: David Irwin M.D.     STROKE:     Chief Complaint  Patient presents with   Trauma Green     HPI: Farhan Tanner is a 88 y.o. right-handed female with past medical history significant for intraparenchymal hemorrhage 2 years ago with no significant residual symptom who was transferred from [facility] after she was found to have intraparenchymal hemorrhage and CT.  Her son tells me, he talk to her on Tuesday night and she was fine and subsequently the next day on Wednesday she called and reported she does not feel well and she cannot move her left side.  Her son found her on the floor with profound left-sided weakness and left facial droop and called 911.  She is not on any blood thinner.  She denies having any headache and she was not profoundly hypertensive.     Review of systems: In addition to what is detailed in the HPI above, all other systems reviewed and are negative.     Past Medical History:    has no past medical history on file.     FHx:  family history includes Cancer in her brother, father, sister, and son; Heart Disease in her father; Hypertension in her mother; Other in her sister.     SHx:   reports that she quit smoking about 56 years ago. Her smoking use included cigarettes. She has a 6.00 pack-year smoking history. She has never used smokeless tobacco. She reports that she does not drink alcohol and does not use drugs.     Allergies:  No  Known Allergies    Assessment:  Farhan Tanner is a 88 y.o. who was transferred from [facility] after she was found to have intraparenchymal hemorrhage in right frontal parietal head region as well as left frontal head region.  The etiology of her hemorrhage is likely amyloid angiopathy and less likely hypertensive.  She has had previous intraparenchymal hemorrhage 2 years ago as well which again is more consistent with amyloid angiopathy.  There is a small left frontoparietal subdural hematoma as well that is likely traumatic from her fall.  Her repeat brain CT is a stable with evidence of right parietal subarachnoid hemorrhage.     Plan:  -Admit to ICU for close neuro monitoring  -q1h and PRN neuro assessment. VS per nursing/unit protocol.   -No known underlying coagulopathy, maintain INR < 1.5, platelets > 100 K  -Maintain systolic blood pressure less than 140, antihypertensives per primary team.  Cardene drip is preferred.  -Repeat brain CT in 6 hours is stable.  -Patient was seen by neurosurgery and no neurosurgical intervention deemed necessary at this time.  -Obtain MRI Brain w/wo contrast to rule out underlying lesion. MRA head without, ordered.  -No antiplatelet or anticoagulant.  -Keep head of bed elevated at 30 degrees.  -Maintain bowel regimen to avoid Valsalva and increase intracranial pressure.  -Recommend aggressive BG management per primary team. Avoid IVF with Dextrose. -BG goal .   -Maintain normothermia, eunatremia and normoglycemia.  -Given multiple cortical hemorrhages and increased risk of seizure, I think is reasonable to continue with Keppra 500 twice daily for seizure prevention.  -PT/OT/SLP eval and treat for early mobilization if blood pressure remains a stable.  -All other medical management per primary team.   -DVT PPX: SCDs.      The plan of care above has been discussed with Dr. Serjio White in emergency room.        Please note that this dictation was created  using voice recognition software. I have made every reasonable attempt to correct obvious errors, but I expect that there are errors of grammar and possibly content that I did not discover before finalizing the note.        Miguel Angel San MD  Acute Care Neurology Services               Slick Hong III, M.D.  Physician  Surgery Neurosurgery  Consults     Unsigned Transcription  Date of Service:  4/20/2023  7:25 AM   suggestion   Draft: Not electronically signed.    DATE OF SERVICE:  04/20/2023      INPATIENT CONSULTATION     CHIEF COMPLAINT:  Intracranial hemorrhages.     HISTORY OF PRESENT ILLNESS:  This is an 88-year-old right-handed woman who was   found down.  She states she is unclear on the overall events.  She had   weakness on the left side.  She was taken to [facility] and a CAT   scan there showed a 5x4 cm right intraparenchymal hematoma on the periphery as   well as a small left frontal lesion and hyperdensity much smaller.  There is   mild midline shift regarding that.  She was transferred to [facility].  A CTA was   negative for any further vascular etiology identification and also stable   scans.  She has been stable since arrival.  Sleepy, but arousable.     PAST MEDICAL HISTORY:  Significant for previous left sided hemorrhage in 2021.     PAST SURGICAL HISTORY:  She denies any significant surgery, but the chart says   she had eye surgery.     SOCIAL HISTORY:  She is a former smoker.  She does not drink.     FAMILY HISTORY:  There is no family at her bedside.     PHYSICAL EXAMINATION:  She opens her eyes to voice.  She does seem to have a   right gaze preference.  Her pupils are symmetric.  Extraocular motions are   intact.  She has a mild left lower facial droop.  She has some movement of the   left upper extremity to volition and pain.  She follows commands briskly in   the right upper extremity and bilateral lower extremities symmetrically.  She   has intact sensation to face, arms and  legs.  She is oriented to person, place   and time.     ASSESSMENT AND PLAN:  The patient has a stable intraparenchymal hemorrhage?   amyloid versus hypertensive.  Because of the areas of other hyperdensity and   hypodensity, I recommend an MRI of the brain with and without contrast to make   sure we do not have an occult lesion.  She was admitted to the ICU for q. 2   hour neuro checks, blood pressure less than 160, Keppra 500 b.i.d. x7 days.  I   will defer the rest of care to the hospitalist and Stroke Neurology.     Thanks for allowing me to participate in her care.     _____________________________  MD José Miguel Vaca III, D.O.  Physician  Physical Medicine & Rehab  Consults     Signed  Date of Service:  4/20/2023  1:52 PM  Consult Orders  IP Consult For Physiatry [[id number]] ordered by Roberta Hernandez D.O. at 04/20/23 1027                                                    Physical Medicine and Rehabilitation Consultation                                                                               Date of initial consultation: 4/20/2023  Consulting provider: Roberta Hernandez D.O.  Reason for consultation: assess for acute inpatient rehab appropriateness  LOS: 1 Day(s)     Chief complaint: Right intraparenchymal hemorrhage     HPI: The patient is a 88 y.o. right hand dominant female with a past medical history of left sided hemorrhage in 2021;  who presented on 4/19/2023  7:07 PM as a transfer from [facility] with advanced imaging showing 5 x 4 cm right intraparenchymal hematoma.  Patient was found on the floor by her son with left facial droop.  Patient came to check on her after he called and patient reported that she cannot move her left side.  Patient was initially brought to [facility] and then transferred to [facility] for neurosurgical evaluation.  Patient was seen by neurology and found to have NIH score of 5.  CTA head today shows large right frontal  parenchymal hemorrhage and left frontal parenchymal hemorrhage, both with surrounding vasogenic edema     The patient currently reports lethargy. She is napping and keeps her eyes closed while following commands. When vision was tested during physical exam she appears to have a left VF cut. Patient denies most ROS including headache, numbness and tingling. She is later joined in the room by her daughter who helps answer questions.      ROS  Pertinent positives are mentioned in the HPI, all others reviewed and are negative.     Social Hx:  1 SH  1 MARBIN  With: Alone, in [place].  Patient has 2 sons and a daughter who are able to provide 24/7 support on discharge     THERAPY:  Restrictions: Bedrest  PT: Functional mobility   Pending     OT: ADLs  Pending     SLP:   Pending     IMAGING:  CTA head 4/20/2023  1.  No large vessel occlusion or aneurysm identified.  2.  Right frontal parenchymal hemorrhage with surrounding vasogenic edema, stable.  3.  Left frontal parenchymal hemorrhage with surrounding vasogenic edema, stable.  4.  Right parietal subarachnoid hemorrhage, stable.     PROCEDURES:  None    ASSESSMENT:  Patient is a 88 y.o. female admitted with right intraparenchymal hematoma and left sided deficits in strength and vision      [facility] Code / Diagnosis to Support: 0001.1 - Stroke: Left Body Involvement (Right Brain)     Rehabilitation: Impaired ADLs and mobility  Patient is expected to be a good candidate for inpatient rehab based on needs for PT, OT, and speech therapy.  Patient will also benefit from family training.  Patient has a good discharge situation which will be home with children.      Barriers to transfer include: Insurance authorization, TCCs to verify disposition, medical clearance and bed availability      All cases are subject to administrative review and recommendations may change     Disposition recommendations:  - Good candidate for IPR. Patient is expected to have deficits with mobility and  "ADLs secondary to her stroke. Patient has good support from her family and an appropriate DC environment.   - TCC to confirm DC support  - Awaiting PT/OT notes   -PMR to follow in the periphery for rehab appropriateness, please reach out with questions or request for medical management     Medical Complexity:     Right intraparenchymal hemorrhage   - close observation in ICU. Appreciate neurosurgical and neurocritical care management  - SBP <160   - Keppra 500mg x 7 days   - MR brain pending   - Awaiting PT/OT and SLP   - Patient is expected to require IPR when medically cleared      Hypertension   - Labetalol PRN   - Hydralazine PRN      DVT PPX: SCDs     Thank you for allowing us to participate in the care of this patient.      Patient was seen for >60 minutes on unit/floor of which > 50% of time was spent on counseling and coordination of care regarding the above, including prognosis, risk reduction, benefits of treatment, and options for next stage of care.     José Miguel Davis, DO   Physical Medicine and Rehabilitation     Co-morbidities:  See above  Potential Risk - Complications: Cognitive Impairment, Contractures, Deep Vein Thrombosis, Dysphagia, Incontinence, Malnutrition, Pain, Perceptual Impairment, Pneumonia, Pressure Ulcer, Seizures, Urinary Tract Infection, and Infection  Level of Risk: High    Ongoing Medical Management Needed (Medical/Nursing Needs):   Patient Active Problem List    Diagnosis Date Noted    Trauma 04/19/2023    Intraparenchymal hemorrhage of brain (HCC) 04/19/2023    Hyperglycemia 06/22/2021    Vasogenic cerebral edema (HCC) 06/22/2021    Confusion 06/22/2021    Dyslipidemia 02/07/2018    Bilateral hearing loss 09/09/2016     Current Vital Signs:   Temperature: 35.9 °C (96.6 °F) Pulse: 73 Respiration: 16 Blood Pressure : 121/72  Weight: 61.2 kg (134 lb 14.7 oz) Height: 160 cm (5' 2.99\")  Pulse Oximetry: 98 % O2 (LPM): 0      Completed Laboratory Reports:  Recent Labs     04/19/23  1641 " 04/19/23  1943 04/20/23  0204 04/20/23  0810 04/20/23  1410 04/20/23 2027 04/21/23  0349 04/21/23  0828   WBC 7.1 7.2 6.2  --   --   --  6.0  --    HEMOGLOBIN 13.1 13.0 13.0  --   --   --  12.8  --    HEMATOCRIT 39.4 37.7 37.5  --   --   --  37.8  --    PLATELETCT 171 170 171  --   --   --  179  --    SODIUM 135* 139 138 137 135 137 137 138   POTASSIUM 4.3 4.0 3.8  --   --   --  3.9  --    BUN 13 12 12  --   --   --  12  --    CREATININE 0.7 0.57 0.52  --   --   --  0.49*  --    ALBUMIN 3.3* 3.6 3.6  --   --   --  3.5  --    GLUCOSE 106* 96 95  --   --   --  87  --    INR  --  0.98  --   --   --   --   --   --      Additional Labs: Not Applicable    Prior Living Situation:   Housing / Facility: 1 Story House  Steps Into Home: 4  Steps In Home: 0  Lives with - Patient's Self Care Capacity: Alone and Able to Care For Self  Equipment Owned: None    Prior Level of Function / Living Situation:   Physical Therapy: Prior Services: Home-Independent  Housing / Facility: 1 Story House  Steps Into Home: 4  Steps In Home: 0  Rail: None  Bathroom Set up: Walk In Shower  Equipment Owned: None  Lives with - Patient's Self Care Capacity: Alone and Able to Care For Self  Bed Mobility: Independent  Transfer Status: Independent  Ambulation: Independent  Assistive Devices Used: None  Stairs: Independent  Current Level of Function:   Gait Level Of Assist: Moderate Assist  Assistive Device: Hand Held Assist (2 person assist)  Distance (Feet): 5  Deviation: Decreased Base Of Support, Bradykinetic, Shuffled Gait, Decreased Heel Strike, Decreased Toe Off  Weight Bearing Status: No precautions  Supine to Sit: Moderate Assist  Sit to Supine:  (up to chair post)  Scooting: Minimal Assist (seated)  Rolling: Moderate Assist to Rt.  Comments: HOB elevated  Sit to Stand: Minimal Assist  Bed, Chair, Wheelchair Transfer: Moderate Assist  Transfer Method: Stand Step (2-person assist)  Sitting in Chair: >10 min; up post  Sitting Edge of Bed: 8  min  Standing: 3-4 min  Occupational Therapy:   Self Feeding: Independent  Grooming / Hygiene: Independent  Bathing: Independent  Dressing: Independent  Toileting: Independent  Medication Management: Independent  Laundry: Independent  Kitchen Mobility: Independent  Finances: Independent  Home Management: Independent  Shopping: Independent  Prior Level Of Mobility: Independent Without Device in Community, Independent Without Device in Home  Driving / Transportation: Driving Independent  Prior Services: Home-Independent  Housing / Facility: [address]  Current Level of Function:   Lower Body Dressing: Maximal Assist (assist to start socks over feet, but able to help pull past heels)  Toileting:  (NT, declined need)  Speech Language Pathology:      Rehabilitation Prognosis/Potential: Good  Estimated Length of Stay: 14-21 days    Nursing:      Oro in Place    Scope/Intensity of Services Recommended:  Physical Therapy: 1 hr / day  5 days / week. Therapeutic Interventions Required: Maximize Endurance, Mobility, Strength, and Safety  Occupational Therapy: 1 hr / day 5 days / week. Therapeutic Interventions Required: Maximize Self Care, ADLs, IADLs, and Energy Conservation  Speech & Language Pathology: 1 hr / day 5 days / week. Therapeutic Interventions Required: Maximize Cognition, Swallowing, and Safety  Rehabilitation Nursin/7. Therapeutic Interventions Required: Monitor Pain, Skin, Vital Signs, Intake and Output, Labs, Safety, Aspiration Risk, Family Training, and Oro catheter care/maintenance; DVT prophylaxis; Bowel regimen; Infection control; ADL's.  Rehabilitation Physician: 3 - 5 days / week. Therapeutic Interventions Required: Medical Management  Respiratory Care: Consult. Therapeutic Interventions Required: Pulmonary Toileting, Aspiration Risk, and Respiratory care per protocol.   Dietician: Consult. Therapeutic Interventions Required: Nutritional evaluation with recommendations to promote optimal  health and healing.     She requires 24-hour rehabilitation nursing to manage bowel and bladder function, skin care, nutrition and fluid intake, pulmonary hygiene, pain control, safety, medication management, and patient/family goals. In addition, rehabilitation nursing will reiterate and reinforce therapy skills and equipment use, including ADLs, as well as provide education to the patient and family. Farhan Tanner is willing to participate in and is able to tolerate the proposed plan of care.    Rehabilitation Goals and Plan (Expected frequency & duration of treatment in the IRF):   Return to the Community, Modified Independent Level of Care, Outpatient Support, and Family Able to Provide 24/7 Assistance  Anticipated Date of Rehabilitation Admission: 4/22/2023  Patient/Family oriented IRF level of care/facility/plan: Yes  Patient/Family willing to participate in IRF care/facility/plan: Yes  Patient able to tolerate IRF level of care proposed: Yes  Patient has potential to benefit IRF level of care proposed: Yes  Comments: Not Applicable    Special Needs or Precautions - Medical Necessity:  Safety Concerns/Precautions:  Fall Risk / High Risk for Falls, Balance, Cognition, and Bed / Chair Alarm  Pain Management  Special Equipment: Oro Catheter    Diet:   DIET ORDERS (From admission to next 24h)       Start     Ordered    04/20/23 1405  Diet Order Diet: Level 3 - Liquidised; Liquid level: Level 2 - Mildly Thick; Tray Modifications (optional): No Straws, SLP - Deliver to Nursing Station, SLP - 1:1 Supervision by Nursing  ALL MEALS        Question Answer Comment   Diet: Level 3 - Liquidised    Liquid level Level 2 - Mildly Thick    Tray Modifications (optional) No Straws    Tray Modifications (optional) SLP - Deliver to Nursing Station    Tray Modifications (optional) SLP - 1:1 Supervision by Nursing        04/20/23 1405                    Anticipated Discharge Destination / Patient/Family Goal:  Destination:  Home with Assistance Support System: Family   Anticipated home health services: OT, PT, SLP, Nursing, Social Work, and Aide  Previously used HH service/ provider: Not Applicable  Anticipated DME Needs:  To be determined  Outpatient Services:  To be determined  Alternative resources to address additional identified needs:   Future Appointments   Date Time Provider Department Center   6/28/2023  9:20 AM Pretty Kirk D.O. [facility]       Pre-Screen Completed: 4/21/2023 5:10 PM Kelsea Mathews R.N.

## 2023-04-23 ENCOUNTER — APPOINTMENT (OUTPATIENT)
Dept: OCCUPATIONAL THERAPY | Facility: REHABILITATION | Age: 88
DRG: 056 | End: 2023-04-23
Attending: PHYSICAL MEDICINE & REHABILITATION
Payer: MEDICARE

## 2023-04-23 ENCOUNTER — APPOINTMENT (OUTPATIENT)
Dept: PHYSICAL THERAPY | Facility: REHABILITATION | Age: 88
DRG: 056 | End: 2023-04-23
Attending: PHYSICAL MEDICINE & REHABILITATION
Payer: MEDICARE

## 2023-04-23 LAB
ALBUMIN SERPL BCP-MCNC: 3.3 G/DL (ref 3.2–4.9)
ALBUMIN/GLOB SERPL: 1.3 G/DL
ALP SERPL-CCNC: 51 U/L (ref 30–99)
ALT SERPL-CCNC: 14 U/L (ref 2–50)
ANION GAP SERPL CALC-SCNC: 10 MMOL/L (ref 7–16)
AST SERPL-CCNC: 18 U/L (ref 12–45)
BASOPHILS # BLD AUTO: 0.4 % (ref 0–1.8)
BASOPHILS # BLD: 0.03 K/UL (ref 0–0.12)
BILIRUB SERPL-MCNC: 1.3 MG/DL (ref 0.1–1.5)
BUN SERPL-MCNC: 8 MG/DL (ref 8–22)
CALCIUM ALBUM COR SERPL-MCNC: 9.2 MG/DL (ref 8.5–10.5)
CALCIUM SERPL-MCNC: 8.6 MG/DL (ref 8.5–10.5)
CHLORIDE SERPL-SCNC: 103 MMOL/L (ref 96–112)
CO2 SERPL-SCNC: 23 MMOL/L (ref 20–33)
CREAT SERPL-MCNC: 0.41 MG/DL (ref 0.5–1.4)
EOSINOPHIL # BLD AUTO: 0.14 K/UL (ref 0–0.51)
EOSINOPHIL NFR BLD: 1.9 % (ref 0–6.9)
ERYTHROCYTE [DISTWIDTH] IN BLOOD BY AUTOMATED COUNT: 41.5 FL (ref 35.9–50)
GFR SERPLBLD CREATININE-BSD FMLA CKD-EPI: 94 ML/MIN/1.73 M 2
GLOBULIN SER CALC-MCNC: 2.6 G/DL (ref 1.9–3.5)
GLUCOSE SERPL-MCNC: 101 MG/DL (ref 65–99)
HCT VFR BLD AUTO: 36.3 % (ref 37–47)
HGB BLD-MCNC: 12.6 G/DL (ref 12–16)
IMM GRANULOCYTES # BLD AUTO: 0.02 K/UL (ref 0–0.11)
IMM GRANULOCYTES NFR BLD AUTO: 0.3 % (ref 0–0.9)
LYMPHOCYTES # BLD AUTO: 1.24 K/UL (ref 1–4.8)
LYMPHOCYTES NFR BLD: 16.5 % (ref 22–41)
MCH RBC QN AUTO: 30.2 PG (ref 27–33)
MCHC RBC AUTO-ENTMCNC: 34.7 G/DL (ref 33.6–35)
MCV RBC AUTO: 87.1 FL (ref 81.4–97.8)
MONOCYTES # BLD AUTO: 0.65 K/UL (ref 0–0.85)
MONOCYTES NFR BLD AUTO: 8.7 % (ref 0–13.4)
NEUTROPHILS # BLD AUTO: 5.43 K/UL (ref 2–7.15)
NEUTROPHILS NFR BLD: 72.2 % (ref 44–72)
NRBC # BLD AUTO: 0 K/UL
NRBC BLD-RTO: 0 /100 WBC
PLATELET # BLD AUTO: 176 K/UL (ref 164–446)
PMV BLD AUTO: 9.7 FL (ref 9–12.9)
POTASSIUM SERPL-SCNC: 3.7 MMOL/L (ref 3.6–5.5)
PREALB SERPL-MCNC: 12.6 MG/DL (ref 18–38)
PROT SERPL-MCNC: 5.9 G/DL (ref 6–8.2)
RBC # BLD AUTO: 4.17 M/UL (ref 4.2–5.4)
SODIUM SERPL-SCNC: 136 MMOL/L (ref 135–145)
WBC # BLD AUTO: 7.5 K/UL (ref 4.8–10.8)

## 2023-04-23 PROCEDURE — 97162 PT EVAL MOD COMPLEX 30 MIN: CPT

## 2023-04-23 PROCEDURE — 92610 EVALUATE SWALLOWING FUNCTION: CPT

## 2023-04-23 PROCEDURE — 97112 NEUROMUSCULAR REEDUCATION: CPT

## 2023-04-23 PROCEDURE — 97535 SELF CARE MNGMENT TRAINING: CPT

## 2023-04-23 PROCEDURE — 99231 SBSQ HOSP IP/OBS SF/LOW 25: CPT | Performed by: PHYSICAL MEDICINE & REHABILITATION

## 2023-04-23 PROCEDURE — 36415 COLL VENOUS BLD VENIPUNCTURE: CPT

## 2023-04-23 PROCEDURE — 770010 HCHG ROOM/CARE - REHAB SEMI PRIVAT*

## 2023-04-23 PROCEDURE — 84134 ASSAY OF PREALBUMIN: CPT

## 2023-04-23 PROCEDURE — 700102 HCHG RX REV CODE 250 W/ 637 OVERRIDE(OP): Performed by: PHYSICAL MEDICINE & REHABILITATION

## 2023-04-23 PROCEDURE — A9270 NON-COVERED ITEM OR SERVICE: HCPCS | Performed by: PHYSICAL MEDICINE & REHABILITATION

## 2023-04-23 PROCEDURE — 85025 COMPLETE CBC W/AUTO DIFF WBC: CPT

## 2023-04-23 PROCEDURE — 92523 SPEECH SOUND LANG COMPREHEN: CPT

## 2023-04-23 PROCEDURE — 97166 OT EVAL MOD COMPLEX 45 MIN: CPT

## 2023-04-23 PROCEDURE — 80053 COMPREHEN METABOLIC PANEL: CPT

## 2023-04-23 RX ADMIN — ACETAMINOPHEN 650 MG: 325 TABLET ORAL at 20:08

## 2023-04-23 RX ADMIN — SENNOSIDES AND DOCUSATE SODIUM 2 TABLET: 50; 8.6 TABLET ORAL at 08:08

## 2023-04-23 RX ADMIN — OMEPRAZOLE 20 MG: 20 CAPSULE, DELAYED RELEASE ORAL at 08:08

## 2023-04-23 ASSESSMENT — GAIT ASSESSMENTS
DISTANCE (FEET): 120
DEVIATION: DECREASED BASE OF SUPPORT;BRADYKINETIC;DECREASED HEEL STRIKE;DECREASED TOE OFF
GAIT LEVEL OF ASSIST: MINIMAL ASSIST

## 2023-04-23 ASSESSMENT — BALANCE ASSESSMENTS
STANDING UNSUPPORTED WITH FEET TOGETHER: 3
STANDING TO SITTING: 1
PLACE ALTERNATE FOOT ON STEP OR STOOL WHILE STANDING UNSUPPORTED: 1
LOOK OVER LEFT AND RIGHT SHOULDERS WHILE STANDING: 1
REACHING FORWARD WITH OUTSTRETCHED ARM WHILE STANDING: 2
LONG VERSION TOTAL SCORE (MAX 56): 27
TRANSFERS: 1
STANDING UNSUPPORTED ONE FOOT IN FRONT: 2
PICK UP OBJECT FROM THE FLOOR FROM A STANDING POSITION: 3
STANDING UNSUPPORTED: 3
SITTING TO STANDING: 3
TURN 360 DEGREES: 0
LONG VERSION TOTAL SCORE (MAX 56): 27
STANDING ON ONE LEG: 1
STANDING UNSUPPORTED WITH EYES CLOSED: 3
SITTING UNSUPPORTED: 3

## 2023-04-23 ASSESSMENT — BRIEF INTERVIEW FOR MENTAL STATUS (BIMS)
INITIAL REPETITION OF BED BLUE SOCK - FIRST ATTEMPT: 3
BIMS SUMMARY SCORE: 11
ASKED TO RECALL BLUE: YES, AFTER CUEING (A COLOR")"
WHAT DAY OF THE WEEK IS IT: CORRECT
ASKED TO RECALL BED: NO, COULD NOT RECALL
WHAT YEAR IS IT: CORRECT
ASKED TO RECALL SOCK: YES, AFTER CUEING (SOMETHING TO WEAR")"
WHAT MONTH IS IT: ACCURATE WITHIN 5 DAYS

## 2023-04-23 ASSESSMENT — ACTIVITIES OF DAILY LIVING (ADL)
TOILET_TRANSFER_DESCRIPTION: INCREASED TIME;INITIAL PREPARATION FOR TASK;SUPERVISION FOR SAFETY;VERBAL CUEING
BED_CHAIR_WHEELCHAIR_TRANSFER_DESCRIPTION: INCREASED TIME;SET-UP OF EQUIPMENT;SUPERVISION FOR SAFETY;VERBAL CUEING
BED_CHAIR_WHEELCHAIR_TRANSFER_DESCRIPTION: INCREASED TIME;SUPERVISION FOR SAFETY;VERBAL CUEING
TUB_SHOWER_TRANSFER_DESCRIPTION: GRAB BAR;SHOWER BENCH;INCREASED TIME;SUPERVISION FOR SAFETY;VERBAL CUEING
TOILETING: INDEPENDENT

## 2023-04-23 ASSESSMENT — FIBROSIS 4 INDEX: FIB4 SCORE: 2.405351177211819462

## 2023-04-23 ASSESSMENT — PAIN DESCRIPTION - PAIN TYPE: TYPE: ACUTE PAIN

## 2023-04-23 NOTE — THERAPY
"Speech Language Pathology   Initial Assessment     Patient Name: Farhan Tanner  AGE:  88 y.o., SEX:  female  Medical Record #: [id number]  Today's Date: 4/23/2023     Subjective    Pt pleasant and cooperative during speech language evaluation and oral pharyngeal evaluations.   Per chart review:  \"The patient is a 88 y.o. right hand dominant female with a past medical history of left sided hemorrhage in 2021;  who presented on 4/19/2023  7:07 PM as a transfer from [facility] with advanced imaging showing 5 x 4 cm right intraparenchymal hematoma.  Patient was found on the floor by her son with left facial droop.  Patient came to check on her after he called and patient reported that she cannot move her left side.  Patient was initially brought to [facility] and then transferred to [facility] for neurosurgical evaluation.  Patient was seen by neurology and found to have NIH score of 5.  CTA head today shows large right frontal parenchymal hemorrhage and left frontal parenchymal hemorrhage, both with surrounding vasogenic edema.  Patient has left visual field cut\"     Objective       04/23/23 0801   Evaluation Charges   SLP Speech Language Evaluation Speech Sound Language Comprehension   SLP Oral Pharyngeal Evaluation Oral Pharyngeal Evaluation   SLP Total Time Spent   SLP Individual Total Time Spent (Mins) 90   Precautions   Precautions Fall Risk   Comments aspiration risk   Prior Living Situation   Prior Services Home-Independent   Housing / Facility 1 Story House   Lives with - Patient's Self Care Capacity Alone and Able to Care For Self   Prior Level Of Function   Communication Within Functional Limits   Swallow Within Functional Limits   Dentition Intact   Dentures None   Hearing Within Functional Limits for Evaluation   Hearing Aid None   Vision Wears Corrective Lenses   Patient's Primary Language English   Occupation (Pre-Hospital Vocational) Retired Due To Age   Receptive Language / Auditory " "Comprehension   Receptive Language / Auditory Comprehension WDL   Expressive Language   Expressive Language (WDL) WDL   Cognition   Cognitive-Linguistic (WDL) X   Attention to Task Minimal (4)   Simple Attention Minimal (4)   Orientation  Supervision (5)   Verbal Short Term Memory 5 Minutes   Clock Drawing Left Neglect   ABS (Agitated Behavior Scale)   Agitated Behavior Scale Performed No   Cognitive Pattern Assessment   Cognitive Pattern Assessment Used BIMS   Brief Interview for Mental Status (BIMS)   Repetition of Three Words (First Attempt) 3   Temporal Orientation: Year Correct   Temporal Orientation: Month Accurate within 5 days   Temporal Orientation: Day Correct   Recall: \"Sock\" Yes, after cueing (\"something to wear\")   Recall: \"Blue\" Yes, after cueing (\"a color\")   Recall: \"Bed\" No, could not recall   BIMS Summary Score 11   Confusion Assessment Method (CAM)   Inattention Behavior present, fluctuates (comes and goes, changes in severity)   Disorganized thinking Behavior present, fluctuates (comes and goes, changes in severity)   Altered level of consciousness Behavior not present   Social / Pragmatic Communication   Social / Pragmatic Communication WDL   Labial Function   Labial Function (WDL) X   Labial Structure At Rest Minimal   Lingual Function   Lingual Function (WDL) WDL   Jaw Function   Jaw Function (WDL) X   Velar Function   Velar Function (WDL) WDL   Laryngeal Function   Laryngeal Function (WDL) X   Voice Quality Minimal   Volutional Cough Minimal   Excursion Upon Swallow Weak    Swallowing   Swallowing (WDL) X   Thick Nectar / Honey / Liquid Minimal   Pureed (4) Minimal   Single Swallow Thin / Nectar (Cup) Moderate   Dysphagia Strategies / Recommendations   Strategies / Interventions Recommended (Yes / No) Yes   Compensatory Strategies Reduce Bolus Size to 1/2 Teaspoon;Multiple Swallows;Single Sips / Bites   Diet / Liquid Recommendation Mildly Thick (2) - (Nectar Thick);Puree (4)   Medication " Administration  Crush all Medications in Puree   Therapy Interventions Dysphagia Therapy By Speech Language Pathologist;Therapeutic Dining For Meals;MBSS Evaluation   Swallowing/Nutritional Status   Swallowing/Nutritional Status Modified food consistency   Eating   Assistance Needed Supervision;Verbal cues   CARE Score - Eating 4   Functional Level of Assist   Eating Moderate Assist   Eating Description Checking for pocketing of food;Increased time;Modified diet;Supervision for safety;Set-up of equipment or meal/tube feeding;Verbal cueing   Comprehension Minimal Assist   Comprehension Description Glasses;Verbal cues   Expression Modified Independent   Social Interaction Independent   Problem Solving Moderate Assist   Problem Solving Description Bed/chair alarm;Increased time;Enclosure bed;Therapy schedule;Verbal cueing;Seat belt   Memory Maximal Assist   Memory Description Bed/chair alarm;Enclosure bed;Increased time;Seat belt;Supervision;Therapy schedule;Verbal cueing   Outcome Measures   Outcome Measures Utilized MASA;SCCAN   MASA ([place] Assessment of Swallowing Ability)   Alertness 10   Cooperation 10   Auditory Comprehension 8   Respiration 8   Respiratory Rate for Swallow 5   Dysphasia 4   Dyspraxia 5   Dysarthria 4   Saliva 5   Lip Seal 3   Tongue Movement 10   Tongue Strength 8   Tongue Coordination 8   Oral Preparation 8   Gag 1   Palate 10   Bolus Clearance 10   Oral Transit 10   Cough Reflex 3   Voluntary Cough 8   Voice 6   Trache 10   Pharygneal Phase 8   Pharyngeal Response 5   Diet Recommendations Puree   Fluid Recommendations Nectar thick liquids   Swallow Integrity Probable dysphagia/aspiration   Total Score 167   Dysphagia Severity Moderate dysphagia   Aspiration Severity Mild aspiration   SCCAN (Scales of Cognitive and Communicative Ability for Neurorehabilitation)   Oral Expression - Raw Score 19   Oral Expression - Scale Performance Score 100   Orientation - Raw Score 10   Orientation - Scale  Performance Score 83   Memory - Raw Score 7   Memory - Scale Performance Score 37   Speech Comprehension - Raw Score 12   Speech Comprehension - Scale Performance Score 92   Problem Solving - Raw Score 18   Problem Solving - Scale Performance Score 78   Problem List   Problem List Cognitive-Linguistic Deficits;Dysphagia;Memory Deficit;Visual Perception Deficit   Current Discharge Plan   Current Discharge Plan Temporarily go to Family /  Friend's Home   Benefit   Therapy Benefit Patient would benefit from Inpatient Rehab Speech-Language Pathology to address above identified deficits.   Interdisciplinary Plan of Care Collaboration   IDT Collaboration with  Nursing   Patient Position at End of Therapy Seated;Chair Alarm On;Self Releasing Lap Belt Applied  (At nurses station)   Collaboration Comments Pureed diet, meds crushed in puree, NTL, t-dine   Strengths & Barriers   Strengths Able to follow instructions;Effective communication skills;Willingly participates in therapeutic activities   Barriers Aspiration risk;Impaired functional cognition   Speech Language Pathologist Assigned   Assigned SLP / Extension TBD/ 60 cog/sw       Assessment    Patient is 88 y.o. female with a diagnosis of intraparenchymal hemorrhage of brain.  Additional factors influencing patient status/progress (ie: cognitive factors, co-morbidities, social support, etc): dysphagia, aspiration risk, memory impairment, left field cut.      Plan  Recommend Speech Therapy  minutes per day 5-6 days per week for 2 weeks for the following treatments:  SLP Swallowing Dysfunction Treatment, SLP Video Swallow Evaluation, SLP Self Care / ADL Training , SLP Cognitive Skill Development, and SLP Group Treatment.    Oral Pharyngeal: Oral mech examination remarkable for min L labial droop. Pt assessed with level 2 mildly thickened liquids, liquidized, and pureed textures. Pt presented with intermittent wet vocal quality with thickened liquids as well as  immediate cough with liquidized textures. Pt then trialed pureed textures with intermittent wet vocal quality, however, no overt coughing episodes. Pt tolerated medications crushed in puree.   Rec: Upgrade diet to level 4 pureed, continue level 2 mildly thickened liquids, enrollment in [facility].   Cognitive Linguistic:  Pt administered the SCCAN (Scales of Cognitive and Communicative Ability for Neurorehabilitation) with performance as follows:     Oral Expression - Raw Score:19  Oral Expression - Scale Performance Score: 100  Orientation - Raw Score: 10  Orientation - Scale Performance Score: 83  Memory - Raw Score: 7  Memory - Scale Performance Score: 37  Speech Comprehension - Raw Score: 12  Speech Comprehension - Scale Performance Score: 92  Reading Comprehension - Raw Score:   Reading Comprehension - Scale Performance Score:   Writing - Raw Score:   Writing - Scale Performance Score:   Attention - Raw Score:   Attention - Scale Performance Score:   Problem Solving - Raw Score: 18  Problem Solving - Scale Performance Score: 78  SCCAN Total Raw Score:  SCCAN Degree of Severity:      Recommend skilled ST to address deficits in memory, visual attention and problem solving for a safe D/C to PLOF.       Passport items to be completed:  Express basic needs, understand food/liquid recommendations, consistently follow swallow precautions, manage finances, manage medications, arrive to therapy appointments on time, complete daily memory log entries, solve problems related to safety situations, review education related to hospitalization, complete caregiver training     Goals:  Long term and short term goals have been discussed with patient and they are in agreement.    Speech Therapy Problems (Active)       Problem: Memory STGs       Dates: Start: 04/23/23         Goal: STG-Within one week, patient will       Dates: Start: 04/23/23       Description: 1) Individualized goal:  recall novel information r/t  hospitalization with external memory aids with 80% accuracy and MIN A.   2) Interventions:  SLP Self Care / ADL Training , SLP Cognitive Skill Development, and SLP Group Treatment                Problem: Problem Solving STGs       Dates: Start: 04/23/23         Goal: STG-Within one week, patient will       Dates: Start: 04/23/23       Description: 1) Individualized goal:  complete administration of the SCCAN with further goals developed as appropriate.  2) Interventions:  SLP Self Care / ADL Training , SLP Cognitive Skill Development, and SLP Group Treatment                  Problem: Social Interaction STGs       Dates: Start: 04/23/23         Goal: STG-Within one week, patient will       Dates: Start: 04/23/23               Problem: Speech/Swallowing LTGs       Dates: Start: 04/23/23         Goal: LTG-By discharge, patient will safely swallow       Dates: Start: 04/23/23       Description: 1) Individualized goal:  the least restrictive diet texture and thin liquids with no overt clinical s/sx of aspiration implementing safe swallow strategies with 90% accuracy and MOD I for a safe D/C home w/ family.   2) Interventions:  SLP Swallowing Dysfunction Treatment, SLP Video Swallow Evaluation, SLP Self Care / ADL Training , and SLP Group Treatment             Goal: LTG-By discharge, patient will solve complex problem       Dates: Start: 04/23/23       Description: 1) Individualized goal:  related to health and safety with 80% accuracy and MOD I for a safe D/C home with family.   2) Interventions:  SLP Self Care / ADL Training , SLP Cognitive Skill Development, and SLP Group Treatment                Problem: Swallowing STGs       Dates: Start: 04/23/23         Goal: STG-Within one week, patient will safely swallow       Dates: Start: 04/23/23       Description: 1) Individualized goal:  pureed textures and mildly thickened liquids w/ no overt clinical s/sx of aspiration in 3/3 meals.   2) Interventions:  SLP Swallowing  Dysfunction Treatment, SLP Self Care / ADL Training , and SLP Group Treatment             Goal: STG-Within one week, patient will       Dates: Start: 04/23/23       Description: 1) Individualized goal:  participate in an instrumental swallow study with goals/diet changes as appropriate.  2) Interventions:  SLP Swallowing Dysfunction Treatment, SLP Video Swallow Evaluation, and SLP Self Care / ADL Training

## 2023-04-23 NOTE — THERAPY
Occupational Therapy   Initial Evaluation     Patient Name: Farhan Tanner  Age:  88 y.o., Sex:  female  Medical Record #: [id number]  Today's Date: 4/23/2023     Subjective    Pt in posey bed upon arrival, pt agreeable to OT session.     Objective       04/23/23 0701   OT Charge Group   OT Self Care / ADL (Units) 1   OT Evaluation OT Evaluation Mod   OT Total Time Spent   OT Individual Total Time Spent (Mins) 60   Prior Living Situation   Prior Services Home-Independent   Housing / Facility 1 Story House   Steps Into Home 4   Bathroom Set up Walk In Shower   Lives with - Patient's Self Care Capacity Alone and Able to Care For Self   Prior Level of ADL Function   Self Feeding Independent   Grooming / Hygiene Independent   Bathing Independent   Dressing Independent   Toileting Independent   Prior Level of IADL Function   Medication Management Independent   Laundry Independent   Kitchen Mobility Independent   Home Management   (Pt reports daughter A with vaccuming)   Prior Level Of Mobility Independent Without Device in Home   Occupation (Pre-Hospital Vocational) Retired Due To Age   Comments may need to verify this information, as this is from pt report   Prior Functioning: Everyday Activities   Self Care Independent   Indoor Mobility (Ambulation) Independent   Stairs Independent   Functional Cognition Independent   Prior Device Use None of the given options   Cognition    Orientation Level   (oriented to name, place, day of week)   Level of Consciousness Alert   Ability To Follow Commands 2 Step   Attention Impaired   Confusion Assessment Method (CAM)   Is there evidence of an acute change in mental status from the patient's baseline? Yes   Inattention Behavior present, fluctuates (comes and goes, changes in severity)   Disorganized thinking Behavior present, fluctuates (comes and goes, changes in severity)   Altered level of consciousness Behavior not present   Vision Screen   Vision   (pt with L neglect and  possible L field cut)   Active ROM Upper Body   Active ROM Upper Body  X   Comments Pt with limited AROM of LUE this date   Strength Upper Body   Upper Body Strength  X   Comments Needs formal testing, pt with limited AROM of LUE   Sensation Upper Body   Upper Extremity Sensation    (unable to determine at this time, will need further testing)   Balance Assessment   Sitting Balance (Static) Fair -   Sitting Balance (Dynamic) Fair -   Standing Balance (Static) Poor +   Standing Balance (Dynamic) Poor   Weight Shift Sitting Fair   Bed Mobility    Supine to Sit Maximal Assist   Eating   Assistance Needed Supervision   Physical Assistance Level No physical assistance   CARE Score - Eating 4   Eating Discharge Goal   Discharge Goal 6   Oral Hygiene   Assistance Needed Physical assistance   Physical Assistance Level 25% or less   CARE Score - Oral Hygiene 3   Oral Hygiene Discharge Goal   Discharge Goal 6   Shower/Bathe Self   Assistance Needed Physical assistance   Physical Assistance Level 76% or more   CARE Score - Shower/Bathe Self 2   Shower/Bathe Self Discharge Goal   Discharge Goal 6   Upper Body Dressing   Assistance Needed Physical assistance   Physical Assistance Level 76% or more   CARE Score - Upper Body Dressing 2   Upper Body Dressing Discharge Goal   Discharge Goal 6   Lower Body Dressing   Assistance Needed Physical assistance   Physical Assistance Level 76% or more   CARE Score - Lower Body Dressing 2   Lower Body Dressing Discharge Goal   Discharge Goal 6   Putting On/Taking Off Footwear   Assistance Needed Physical assistance   Physical Assistance Level 26%-50%   CARE Score - Putting On/Taking Off Footwear 3   Putting On/Taking Off Footwear Discharge Goal   Discharge Goal 6   Toileting Hygiene   Assistance Needed Physical assistance   Physical Assistance Level 76% or more   CARE Score - Toileting Hygiene 2   Toileting Hygiene Discharge Goal   Discharge Goal 6   Toilet Transfer   Assistance Needed  Physical assistance   Physical Assistance Level 25% or less   CARE Score - Toilet Transfer 3   Toilet Transfer Discharge Goal   Discharge Goal 6   Hearing, Speech, and Vision   Ability to Hear Adequate   Ability to See in Adequate Light Adequate   Expression of Ideas and Wants Some difficulty   Understanding Verbal and Non-Verbal Content Usually understands   Functional Level of Assist   Grooming Minimal Assist   Grooming Description Seated in wheelchair at sink;Set-up of equipment;Supervision for safety;Verbal cueing;Increased time   Bathing Maximal Assist   Bathing Description Grab bar;Hand held shower;Tub bench;Assit with back;Assit wtih lower extremities;Increased time;Initial preparation for task;Supervision for safety   Upper Body Dressing Maximal Assist   Upper Body Dressing Description Assit with threading arms through sleeves;Assist with pulling shirt over head;Increased time;Initial preparation for task;Supervision for safety;Verbal cueing   Lower Body Dressing Maximal Assist   Lower Body Dressing Description Assist with threading into pant leg;Increased time;Initial preparation for task;Supervision for safety;Verbal cueing;Grab bar   Toileting Maximal Assist   Toileting Description Assist to pull pants up;Assist to pull pants down;Assist for standing balance;Grab bar;Increased time;Initial preparation for task;Supervision for safety;Verbal cueing   Bed, Chair, Wheelchair Transfer Minimal Assist   Bed Chair Wheelchair Transfer Description Increased time;Supervision for safety;Verbal cueing   Toilet Transfers Minimal Assist   Toilet Transfer Description Increased time;Initial preparation for task;Supervision for safety;Verbal cueing   Tub / Shower Transfers Minimal Assist   Tub Shower Transfer Description Grab bar;Shower bench;Increased time;Supervision for safety;Verbal cueing   Precautions   Precautions Fall Risk   Comments L neglect, L deepika, aspiration risk   Current Discharge Plan   Current Discharge  Plan Unknown at this Time   Benefit    Therapy Benefit Patient Would Benefit from Inpatient Rehab Occupational Therapy to Maximize [place] with ADLs, IADLs and Functional Mobility.   Interdisciplinary Plan of Care Collaboration   IDT Collaboration with  Nursing;Certified Nursing Assistant   Patient Position at End of Therapy Seated;Chair Alarm On;Self Releasing Lap Belt Applied;Other (Comments)  (at nursing station)   Strengths & Barriers   Strengths Independent prior level of function;Making steady progress towards goals;Manages pain appropriately;Motivated for self care and independence;Pleasant and cooperative;Willingly participates in therapeutic activities   Barriers Confused;Decreased endurance;Difficulty following instructions;Fatigue;Generalized weakness;Hemiplegia;Impaired activity tolerance;Impaired balance;Impulsive;Visual impairment       Assessment  Patient is 88 y.o. female with a diagnosis of Intraparenchymal hemorrhage of brain. Pt with a past medical history of left sided hemorrhage in 2021.    Pt lives in a 1 story home with 4 MARBIN. Pt reports bathroom set-up includes: Walk-in Shower. Pt reports independence with ADLs and unable to determine how many IADLs pt completes alone. Unable to get complete home set-up.    At time of evaluation patient presents below functional baseline w/ primary barriers being L neglect and hemiplegia, activity tolerance, fatigue, impaired balance, and decreased endurance. They will benefit from daily skilled OT to maximize independence w/ ADLs, IADLs, and functional mobility.      Plan  Recommend Occupational Therapy  minutes per day 5-7 days per week for 3 weeks for the following treatments:  OT Group Therapy, OT Self Care/ADL, OT Cognitive Skill Dev, OT Community Reintegration, OT Neuro Re-Ed/Balance, OT Therapeutic Activity, OT Evaluation, and OT Therapeutic Exercise.    Passport items to be completed:  Perform bathroom transfers, complete dressing, complete  feeding, get ready for the day, prepare a simple meal, participate in household tasks, adapt home for safety needs, demonstrate home exercise program, complete caregiver training     Goals:  Long term and short term goals have been discussed with patient and they are in agreement.    Occupational Therapy Goals (Active)       Problem: Bathing       Dates: Start: 04/23/23         Goal: STG-Within one week, patient will bathe with mod A overall using AE/DME as needed.       Dates: Start: 04/23/23               Problem: Dressing       Dates: Start: 04/23/23         Goal: STG-Within one week, patient will dress UB with mod A overall using AE/DME as needed.       Dates: Start: 04/23/23            Goal: STG-Within one week, patient will dress LB with mod A overall using AE/DME as needed.       Dates: Start: 04/23/23               Problem: Grooming       Dates: Start: 04/23/23         Goal: STG-Within one week, patient will complete grooming with CGA for standing at sink using AE/DME as needed.       Dates: Start: 04/23/23               Problem: OT Long Term Goals       Dates: Start: 04/23/23         Goal: LTG-By discharge, patient will complete basic self care tasks with supervision-mod I overall using AE/DME as needed.       Dates: Start: 04/23/23            Goal: LTG-By discharge, patient will perform bathroom transfers with supervision-mod I overall using AE/DME as needed.       Dates: Start: 04/23/23               Problem: Toileting       Dates: Start: 04/23/23         Goal: STG-Within one week, patient will complete toileting tasks with mod A overall using AE/DME as needed.       Dates: Start: 04/23/23

## 2023-04-23 NOTE — CARE PLAN
The patient is Stable - Low risk of patient condition declining or worsening    Shift Goals  Clinical Goals: Safety  Patient Goals: Safety    Progress made toward(s) clinical / shift goals:      Problem: Fall Risk - Rehab  Goal: Patient will remain free from falls  Outcome: Progressing  Patient is high fall risk. Impulsive at times. Enclosure bed in used for safety. Room is close to nursing station.      Problem: Pain - Standard  Goal: Alleviation of pain or a reduction in pain to the patient’s comfort goal  Outcome: Progressing  Patient denied any pain or discomfort during shift.        Patient is not progressing towards the following goals:

## 2023-04-23 NOTE — PROGRESS NOTES
Patient noted to have several attempts to get out of bed. Placed patient on chair in front of the nursing station however still tried to get up and walk without assistance even with seatbelt and chair alarms. Dr. Davis contacted and ordered to be placed on an enclosure bed to prevent her from falling. Daughter informed.

## 2023-04-23 NOTE — PROGRESS NOTES
"  Physical Medicine & Rehabilitation Progress Note    Encounter Date: 4/23/2023    Chief Complaint: Right brain stroke    Interval Events (Subjective):  Patient doing well this morning, she is surrounded by family.  Patient was altered last night, required soma bed and closure to keep her safe.  Patient denies new issues, all questions answered for family.  Discussed patient's mobility of her left hand is a good sign for a significant functional recovery.    Objective:  VITAL SIGNS: /63   Pulse 88   Temp 37 °C (98.6 °F) (Oral)   Resp 16   Ht 1.6 m (5' 3\")   Wt 63 kg (138 lb 14.2 oz)   SpO2 93%   BMI 24.60 kg/m²   Gen: NAD  Head:  NC/AT  Eyes/ Nose/ Mouth: PERRLA, moist mucous membranes  Cardio: RRR, good distal perfusion, warm extremities  Pulm: normal respiratory effort, no cyanosis   Abd: Soft NTND, negative borborygmi   Ext: No peripheral edema. No calf tenderness. No clubbing.      Laboratory Values:  Recent Results (from the past 72 hour(s))   POCT glucose device results    Collection Time: 04/20/23  2:09 PM   Result Value Ref Range    POC Glucose, Blood 89 65 - 99 mg/dL   Sodium Serum (NA)    Collection Time: 04/20/23  2:10 PM   Result Value Ref Range    Sodium 135 135 - 145 mmol/L   Sodium Serum (NA)    Collection Time: 04/20/23  8:27 PM   Result Value Ref Range    Sodium 137 135 - 145 mmol/L   CBC with Differential    Collection Time: 04/21/23  3:49 AM   Result Value Ref Range    WBC 6.0 4.8 - 10.8 K/uL    RBC 4.24 4.20 - 5.40 M/uL    Hemoglobin 12.8 12.0 - 16.0 g/dL    Hematocrit 37.8 37.0 - 47.0 %    MCV 89.2 81.4 - 97.8 fL    MCH 30.2 27.0 - 33.0 pg    MCHC 33.9 33.6 - 35.0 g/dL    RDW 43.8 35.9 - 50.0 fL    Platelet Count 179 164 - 446 K/uL    MPV 9.2 9.0 - 12.9 fL    Neutrophils-Polys 67.60 44.00 - 72.00 %    Lymphocytes 20.30 (L) 22.00 - 41.00 %    Monocytes 11.00 0.00 - 13.40 %    Eosinophils 0.50 0.00 - 6.90 %    Basophils 0.30 0.00 - 1.80 %    Immature Granulocytes 0.30 0.00 - 0.90 %    " Nucleated RBC 0.00 /100 WBC    Neutrophils (Absolute) 4.06 2.00 - 7.15 K/uL    Lymphs (Absolute) 1.22 1.00 - 4.80 K/uL    Monos (Absolute) 0.66 0.00 - 0.85 K/uL    Eos (Absolute) 0.03 0.00 - 0.51 K/uL    Baso (Absolute) 0.02 0.00 - 0.12 K/uL    Immature Granulocytes (abs) 0.02 0.00 - 0.11 K/uL    NRBC (Absolute) 0.00 K/uL   Complete Metabolic Panel    Collection Time: 04/21/23  3:49 AM   Result Value Ref Range    Sodium 137 135 - 145 mmol/L    Potassium 3.9 3.6 - 5.5 mmol/L    Chloride 107 96 - 112 mmol/L    Co2 19 (L) 20 - 33 mmol/L    Anion Gap 11.0 7.0 - 16.0    Glucose 87 65 - 99 mg/dL    Bun 12 8 - 22 mg/dL    Creatinine 0.49 (L) 0.50 - 1.40 mg/dL    Calcium 8.4 (L) 8.5 - 10.5 mg/dL    AST(SGOT) 17 12 - 45 U/L    ALT(SGPT) 12 2 - 50 U/L    Alkaline Phosphatase 52 30 - 99 U/L    Total Bilirubin 1.3 0.1 - 1.5 mg/dL    Albumin 3.5 3.2 - 4.9 g/dL    Total Protein 6.0 6.0 - 8.2 g/dL    Globulin 2.5 1.9 - 3.5 g/dL    A-G Ratio 1.4 g/dL   CORRECTED CALCIUM    Collection Time: 04/21/23  3:49 AM   Result Value Ref Range    Correct Calcium 8.8 8.5 - 10.5 mg/dL   ESTIMATED GFR    Collection Time: 04/21/23  3:49 AM   Result Value Ref Range    GFR (CKD-EPI) 90 >60 mL/min/1.73 m 2   POCT glucose device results    Collection Time: 04/21/23  8:27 AM   Result Value Ref Range    POC Glucose, Blood 76 65 - 99 mg/dL   Sodium Serum (NA)    Collection Time: 04/21/23  8:28 AM   Result Value Ref Range    Sodium 138 135 - 145 mmol/L   CoV-2, Flu A/B, And RSV by PCR ([employer])    Collection Time: 04/22/23 12:35 PM    Specimen: Respirate   Result Value Ref Range    Influenza virus A RNA Negative Negative    Influenza virus B, PCR Negative Negative    RSV, PCR Negative Negative    SARS-CoV-2 by PCR NotDetected     SARS-CoV-2 Source NP Swab    CBC with Differential    Collection Time: 04/23/23  5:49 AM   Result Value Ref Range    WBC 7.5 4.8 - 10.8 K/uL    RBC 4.17 (L) 4.20 - 5.40 M/uL    Hemoglobin 12.6 12.0 - 16.0 g/dL    Hematocrit 36.3  (L) 37.0 - 47.0 %    MCV 87.1 81.4 - 97.8 fL    MCH 30.2 27.0 - 33.0 pg    MCHC 34.7 33.6 - 35.0 g/dL    RDW 41.5 35.9 - 50.0 fL    Platelet Count 176 164 - 446 K/uL    MPV 9.7 9.0 - 12.9 fL    Neutrophils-Polys 72.20 (H) 44.00 - 72.00 %    Lymphocytes 16.50 (L) 22.00 - 41.00 %    Monocytes 8.70 0.00 - 13.40 %    Eosinophils 1.90 0.00 - 6.90 %    Basophils 0.40 0.00 - 1.80 %    Immature Granulocytes 0.30 0.00 - 0.90 %    Nucleated RBC 0.00 /100 WBC    Neutrophils (Absolute) 5.43 2.00 - 7.15 K/uL    Lymphs (Absolute) 1.24 1.00 - 4.80 K/uL    Monos (Absolute) 0.65 0.00 - 0.85 K/uL    Eos (Absolute) 0.14 0.00 - 0.51 K/uL    Baso (Absolute) 0.03 0.00 - 0.12 K/uL    Immature Granulocytes (abs) 0.02 0.00 - 0.11 K/uL    NRBC (Absolute) 0.00 K/uL   Comp Metabolic Panel (CMP)    Collection Time: 04/23/23  5:49 AM   Result Value Ref Range    Sodium 136 135 - 145 mmol/L    Potassium 3.7 3.6 - 5.5 mmol/L    Chloride 103 96 - 112 mmol/L    Co2 23 20 - 33 mmol/L    Anion Gap 10.0 7.0 - 16.0    Glucose 101 (H) 65 - 99 mg/dL    Bun 8 8 - 22 mg/dL    Creatinine 0.41 (L) 0.50 - 1.40 mg/dL    Calcium 8.6 8.5 - 10.5 mg/dL    AST(SGOT) 18 12 - 45 U/L    ALT(SGPT) 14 2 - 50 U/L    Alkaline Phosphatase 51 30 - 99 U/L    Total Bilirubin 1.3 0.1 - 1.5 mg/dL    Albumin 3.3 3.2 - 4.9 g/dL    Total Protein 5.9 (L) 6.0 - 8.2 g/dL    Globulin 2.6 1.9 - 3.5 g/dL    A-G Ratio 1.3 g/dL   Prealbumin    Collection Time: 04/23/23  5:49 AM   Result Value Ref Range    Pre-Albumin 12.6 (L) 18.0 - 38.0 mg/dL   CORRECTED CALCIUM    Collection Time: 04/23/23  5:49 AM   Result Value Ref Range    Correct Calcium 9.2 8.5 - 10.5 mg/dL   ESTIMATED GFR    Collection Time: 04/23/23  5:49 AM   Result Value Ref Range    GFR (CKD-EPI) 94 >60 mL/min/1.73 m 2       Medications:  Scheduled Medications   Medication Dose Frequency    Pharmacy Consult Request  1 Each PHARMACY TO DOSE    omeprazole  20 mg DAILY    senna-docusate  2 Tablet BID     PRN medications:  acetaminophen **OR** acetaminophen, Respiratory Therapy Consult, senna-docusate **AND** polyethylene glycol/lytes **AND** magnesium hydroxide **AND** bisacodyl, hydrALAZINE    Diet:  Current Diet Order   Procedures    Diet Order Diet: Level 4 - Pureed (1:1 Feed, meds crushed in puree, no straws); Liquid level: Level 2 - Mildly Thick       Assessment:  Active Hospital Problems    Diagnosis     *Intraparenchymal hemorrhage of brain (HCC)     Hypertension     Vasogenic brain edema (HCC)        Medical Decision Making and Plan:  Right intraparenchymal hemorrhage   -Etiology of stroke is cerebral amyloid angiopathy  -Patient has been followed by neurology, neurosurgery and hospital medicine at the acute hospital  -Strict BP goal 100-130 systolic long-term  -SCDs only for DVT Ppx  -MRI brain pending  -Admit to [facility] for aggressive rehabilitation with PT and OT for mobility and ADLs.  SLP for cog, speech and swallow deficits.  Per guidelines, 15 hours per week between PT, OT and SLP.  -Follow-up with stroke neurology     Altered mental status, confusion, restlessness, impulsiveness  -Soma bed  -Frequent reorientation  -Family supervision during daytime hours    Seizure prophylaxis  -Keppra 500 mg twice daily for 7 days     Pain  -Tylenol 650 mg every 4 hours as needed     GI Ppx  -Omeprazole 20 mg daily     Hypertension   -Strict BP goal 100-130 systolic  - Hydralazine 3 times daily as needed for pressures over 140 systolic     DVT PPx: SCDs only    ____________________________________    José Miguel Davis DO MS  [facility] - Physical Medicine & Rehabilitation   ____________________________________

## 2023-04-23 NOTE — THERAPY
"Physical Therapy   Initial Evaluation     Patient Name: Farhan Tanner  Age:  88 y.o., Sex:  female  Medical Record #: [id number]  Today's Date: 4/23/2023     Subjective    Pt received up in , agreeable to participate. \"What's that?\" multiple times during session as pt got distracted by objects/ environment on her R side. Pleasantly confused but easily redirected to task.      Objective       04/23/23 1331   PT Charge Group   PT Neuromuscular Re-Education / Balance (Units) 1   PT Evaluation PT Evaluation Mod   PT Total Time Spent   PT Individual Total Time Spent (Mins) 60   Prior Living Situation   Prior Services Home-Independent   Housing / Facility 1 Story House   Steps Into Home   (2-3, pt unsure)   Steps In Home 0   Rail None   Equipment Owned None   Lives with - Patient's Self Care Capacity Alone and Able to Care For Self   Comments Lives alone in [place]. Pt reports that son and dtr live nearby. Son manages pt's finances and dtr comes 1x/month to vacuum. Pt was poor historian, recommend verifying home setup and possible support upon d/c with family.   Prior Level of Functional Mobility   Bed Mobility Independent   Transfer Status Independent   Ambulation Independent   Distance Ambulation (Feet)   (community (pt reports going to [employer] and grocery shopping))   Assistive Devices Used None   Stairs Independent   Prior Functioning: Everyday Activities   Self Care Independent   Indoor Mobility (Ambulation) Independent   Stairs Independent   Functional Cognition Needed some help   Prior Device Use None of the given options   Cognition    Safety Awareness Impaired;Impulsive   New Learning Impaired   Attention Impaired   Passive ROM Lower Body   Passive ROM Lower Body WDL   Active ROM Lower Body    Active ROM Lower Body  WDL   Strength Lower Body   Lower Body Strength  X   Lt Hip Flexion Strength 3+ (F+)   Lt Knee Extension Strength 3+ (F+)   Comments BLE grossly 4-/5 unless otherwise noted   Sensation Lower " Body   Lower Extremity Sensation   X   Comments grossly impaired LLE LT, unable to formerly assess d/t pt's poor understanding of instructions   Lower Body Muscle Tone   Lower Body Muscle Tone  WDL   Comments normal MAS L ankle DF and knee flexion/ ext   Balance Assessment   Sitting Balance (Static) Fair -   Sitting Balance (Dynamic) Fair -   Standing Balance (Static) Fair -   Standing Balance (Dynamic) Poor   Weight Shift Sitting Fair   Weight Shift Standing Fair   Comments no UE support in standing   Bed Mobility    Supine to Sit Standby Assist   Sit to Supine Standby Assist  (inc time)   Sit to Stand Contact Guard Assist   Scooting Standby Assist   Rolling Standby Assist   Neurological Concerns   Neurological Concerns Yes   Comments L neglect; normal B Babinski and clonus upon testing   Coordination Lower Body    Coordination Lower Body  X   Comments intact B slow alternating toe taps, impaired L fast toe taps; intact R finger to nose, impaired L finger to nose with dysmetria   Roll Left and Right   Assistance Needed Supervision   CARE Score - Roll Left and Right 4   Roll Left and Right Discharge Goal   Discharge Goal 6   Sit to Lying   Assistance Needed Supervision   CARE Score - Sit to Lying 4   Sit to Lying Discharge Goal   Discharge Goal 6   Lying to Sitting on Side of Bed   Assistance Needed Supervision   CARE Score - Lying to Sitting on Side of Bed 4   Lying to Sitting on Side of Bed Discharge Goal   Discharge Goal 6   Sit to Stand   Assistance Needed Incidental touching   CARE Score - Sit to Stand 4   Sit to Stand Discharge Goal   Discharge Goal 6   Chair/Bed-to-Chair Transfer   Assistance Needed Physical assistance   Physical Assistance Level 25% or less   CARE Score - Chair/Bed-to-Chair Transfer 3   Chair/Bed-to-Chair Transfer Discharge Goal   Discharge Goal 6   Car Transfer   Reason if not Attempted Environmental limitations   CARE Score - Car Transfer 10   Car Transfer Discharge Goal   Discharge Goal 5    Walk 10 Feet   Assistance Needed Physical assistance   Physical Assistance Level 25% or less   CARE Score - Walk 10 Feet 3   Walk 10 Feet Discharge Goal   Discharge Goal 6   Walk 50 Feet with Two Turns   Assistance Needed Physical assistance   Physical Assistance Level 25% or less   CARE Score - Walk 50 Feet with Two Turns 3   Walk 50 Feet with Two Turns Discharge Goal   Discharge Goal 6   Walk 150 Feet   Reason if not Attempted Safety concerns   CARE Score - Walk 150 Feet 88   Walk 150 Feet Discharge Goal   Discharge Goal 6   Walking 10 Feet on Uneven Surfaces   Reason if not Attempted Safety concerns   CARE Score - Walking 10 Feet on Uneven Surfaces 88   Walking 10 Feet on Uneven Surfaces Discharge Goal   Discharge Goal 4   1 Step (Curb)   Reason if not Attempted Safety concerns   CARE Score - 1 Step (Curb) 88   1 Step (Curb) Discharge Goal   Discharge Goal 6   4 Steps   Reason if not Attempted Safety concerns   CARE Score - 4 Steps 88   4 Steps Discharge Goal   Discharge Goal 4   12 Steps   Reason if not Attempted Safety concerns   CARE Score - 12 Steps 88   12 Steps Discharge Goal   Discharge Goal 4   Picking Up Object   Assistance Needed Incidental touching   CARE Score - Picking Up Object 4   Picking Up Object Discharge Goal   Discharge Goal 6   Wheel 50 Feet with Two Turns   Reason if not Attempted Activity not applicable   CARE Score - Wheel 50 Feet with Two Turns 9   Wheel 50 Feet with Two Turns Discharge Goal   Discharge Goal 9   Wheel 150 Feet   Reason if not Attempted Activity not applicable   CARE Score - Wheel 150 Feet 9   Wheel 150 Feet Discharge Goal   Discharge Goal 9   Gait Functional Level of Assist    Gait Level Of Assist Minimal Assist   Assistive Device None   Distance (Feet) 120   # of Times Distance was Traveled 1   Deviation Decreased Base Of Support;Bradykinetic;Decreased Heel Strike;Decreased Toe Off  (path deviation to the L)   Transfer Functional Level of Assist   Bed, Chair,  Wheelchair Transfer Minimal Assist   Bed Chair Wheelchair Transfer Description Increased time;Set-up of equipment;Supervision for safety;Verbal cueing  (stand step no AD)   Problem List    Problems Impaired Bed Mobility;Impaired Transfers;Impaired Ambulation;Functional Strength Deficit;Impaired Balance;Impaired Coordination;Impaired Vision;Decreased Activity Tolerance;Safety Awareness Deficits / Cognition   Precautions   Precautions Fall Risk   Comments L neglect, L deepika, aspiration risk   Current Discharge Plan   Current Discharge Plan Unknown at this Time   Interdisciplinary Plan of Care Collaboration   IDT Collaboration with  Nursing;Occupational Therapist   Patient Position at End of Therapy Seated;Chair Alarm On;Self Releasing Lap Belt Applied;Call Light within Reach;Tray Table within Reach;Phone within Reach   Collaboration Comments handoff from RN with RN changing dressings to LUE abrasions; CLOF with OT   Benefit   Therapy Benefit Patient Would Benefit from Inpatient Rehabilitation Physical Therapy to Maximize Functional [place] with ADLs, IADLs and Mobility.   Strengths & Barriers   Strengths Able to follow instructions;Effective communication skills;Independent prior level of function;Pleasant and cooperative;Willingly participates in therapeutic activities   Barriers Confused;Decreased endurance;Fatigue;Hemiparesis;Home accessibility;Impaired activity tolerance;Impaired balance;Impaired carryover of learning;Impaired insight/denial of deficits;Impaired functional cognition;Impulsive;Limited mobility;Visual impairment     Pt was oriented to role of PT and expectations of POC.     Visual field cut testing:  R side - approx 30 deg of peripheral vision (normal)  L side - approx 0 deg of peripheral vision (abnormal)    Edu pt re: basics of neglect after CVA and its impact on functional mobility, however retention of this information is unlikely.    Scored 27/56 on Bond Balance Scale (see Epic flowsheet  "for details).     Assessment  Patient is 88 y.o. female with a diagnosis of right frontal parenchymal hemorrhage and left frontal parenchymal hemorrhage.  Additional factors influencing patient status / progress (ie: cognitive factors, co-morbidities, social support, etc): PMH of left sided hemorrhage in 2021; independent PLOF with no AD and still driving; lives alone but has adult children who live nearby; current confusion which rendered pt a poor historian at this time. Recommend verifying home setup and possible support with family prior to d/c.      Pt presents with impaired cognition and L neglect that impairs her txfers, ambulation, and standing balance. Pt with poor insight into current deficits noting \"swallowing\" as her primary impairment. \"What if I don't need that side\" when informed that she presents with L neglect. Pt also presented with impulsivity, ie suddenly walking away during Bond Balance Scale test items. Pt is performing below her baseline level of function and should benefit from inpatient rehabilitation PT services to address the above listed deficits and maximize functional independence.     Plan  Recommend Physical Therapy  minutes per day 5-7 days per week for 14-21 days for the following treatments:  PT Gait Training, PT Therapeutic Exercises, PT Neuro Re-Ed/Balance, PT Therapeutic Activity, PT Manual Therapy, and PT Evaluation.    Passport items to be completed:  Get in/out of bed safely, in/out of a vehicle, safely use mobility device, walk or wheel around home/community, navigate up and down stairs, show how to get up/down from the ground, ensure home is accessible, demonstrate HEP, complete caregiver training    Goals:  Long term and short term goals have been discussed with patient and they are in agreement.    Physical Therapy Problems (Active)       Problem: Mobility       Dates: Start: 04/23/23         Goal: STG-Within one week, patient will ambulate household distance 150 " ft with no AD vs LRAD and CGA.       Dates: Start: 04/23/23            Goal: STG-Within one week, patient will ambulate up/down a curb with no AD vs LRAD and CGA to facilitate return to community ambulation.       Dates: Start: 04/23/23            Goal: STG-Within one week, patient will ascend and descend 2-3 stairs with CGA and BHR prn.       Dates: Start: 04/23/23               Problem: Mobility Transfers       Dates: Start: 04/23/23         Goal: STG-Within one week, patient will transfer bed to chair with no AD vs LRAD and SBA.       Dates: Start: 04/23/23               Problem: PT-Long Term Goals       Dates: Start: 04/23/23         Goal: LTG-By discharge, patient will ambulate 200 ft x3 indoors/ outdoors with no AD vs LRAD and SPV.       Dates: Start: 04/23/23            Goal: LTG-By discharge, patient will transfer one surface to another with no AD vs LRAD and SPV.       Dates: Start: 04/23/23            Goal: LTG-By discharge, patient will ambulate up/down 2-3 stairs with SPV, no AD vs LRAD, and no HR to simulate home entrance.       Dates: Start: 04/23/23            Goal: LTG-By discharge, patient will transfer in/out of a car with no AD vs LRAD and SPV after set up.       Dates: Start: 04/23/23

## 2023-04-23 NOTE — CARE PLAN
The patient is Stable - Low risk of patient condition declining or worsening    Problem: Knowledge Deficit - Standard  Goal: Patient and family/care givers will demonstrate understanding of plan of care, disease process/condition, diagnostic tests and medications  Outcome: Reviewed POC.     Problem: Fall Risk - Rehab  Goal: Patient will remain free from falls  Outcome: Pt has been very impulsive and place in soma bed for safety.      Shift Goals  Clinical Goals: Safety  Patient Goals: Safety

## 2023-04-24 ENCOUNTER — APPOINTMENT (OUTPATIENT)
Dept: PAIN MANAGEMENT | Facility: REHABILITATION | Age: 88
DRG: 056 | End: 2023-04-24
Attending: PHYSICAL MEDICINE & REHABILITATION
Payer: MEDICARE

## 2023-04-24 ENCOUNTER — APPOINTMENT (OUTPATIENT)
Dept: SPEECH THERAPY | Facility: REHABILITATION | Age: 88
DRG: 056 | End: 2023-04-24
Attending: PHYSICAL MEDICINE & REHABILITATION
Payer: MEDICARE

## 2023-04-24 ENCOUNTER — APPOINTMENT (OUTPATIENT)
Dept: RADIOLOGY | Facility: REHABILITATION | Age: 88
DRG: 056 | End: 2023-04-24
Attending: PHYSICAL MEDICINE & REHABILITATION
Payer: MEDICARE

## 2023-04-24 ENCOUNTER — APPOINTMENT (OUTPATIENT)
Dept: PHYSICAL THERAPY | Facility: REHABILITATION | Age: 88
DRG: 056 | End: 2023-04-24
Attending: PHYSICAL MEDICINE & REHABILITATION
Payer: MEDICARE

## 2023-04-24 ENCOUNTER — APPOINTMENT (OUTPATIENT)
Dept: OCCUPATIONAL THERAPY | Facility: REHABILITATION | Age: 88
DRG: 056 | End: 2023-04-24
Attending: PHYSICAL MEDICINE & REHABILITATION
Payer: MEDICARE

## 2023-04-24 DIAGNOSIS — I61.9 INTRAPARENCHYMAL HEMORRHAGE OF BRAIN (HCC): ICD-10-CM

## 2023-04-24 PROCEDURE — 74230 X-RAY XM SWLNG FUNCJ C+: CPT

## 2023-04-24 PROCEDURE — 99232 SBSQ HOSP IP/OBS MODERATE 35: CPT | Performed by: PHYSICAL MEDICINE & REHABILITATION

## 2023-04-24 PROCEDURE — 700102 HCHG RX REV CODE 250 W/ 637 OVERRIDE(OP): Performed by: PHYSICAL MEDICINE & REHABILITATION

## 2023-04-24 PROCEDURE — 97530 THERAPEUTIC ACTIVITIES: CPT

## 2023-04-24 PROCEDURE — 97129 THER IVNTJ 1ST 15 MIN: CPT

## 2023-04-24 PROCEDURE — 97130 THER IVNTJ EA ADDL 15 MIN: CPT

## 2023-04-24 PROCEDURE — A9270 NON-COVERED ITEM OR SERVICE: HCPCS | Performed by: PHYSICAL MEDICINE & REHABILITATION

## 2023-04-24 PROCEDURE — 97116 GAIT TRAINING THERAPY: CPT

## 2023-04-24 PROCEDURE — 770010 HCHG ROOM/CARE - REHAB SEMI PRIVAT*

## 2023-04-24 PROCEDURE — 92611 MOTION FLUOROSCOPY/SWALLOW: CPT

## 2023-04-24 PROCEDURE — 97535 SELF CARE MNGMENT TRAINING: CPT

## 2023-04-24 PROCEDURE — 97112 NEUROMUSCULAR REEDUCATION: CPT

## 2023-04-24 RX ADMIN — SENNOSIDES AND DOCUSATE SODIUM 2 TABLET: 50; 8.6 TABLET ORAL at 09:06

## 2023-04-24 RX ADMIN — OMEPRAZOLE 20 MG: 20 CAPSULE, DELAYED RELEASE ORAL at 09:06

## 2023-04-24 SDOH — ECONOMIC STABILITY: TRANSPORTATION INSECURITY
IN THE PAST 12 MONTHS, HAS LACK OF RELIABLE TRANSPORTATION KEPT YOU FROM MEDICAL APPOINTMENTS, MEETINGS, WORK OR FROM GETTING THINGS NEEDED FOR DAILY LIVING?: NO

## 2023-04-24 SDOH — ECONOMIC STABILITY: TRANSPORTATION INSECURITY
IN THE PAST 12 MONTHS, HAS THE LACK OF TRANSPORTATION KEPT YOU FROM MEDICAL APPOINTMENTS OR FROM GETTING MEDICATIONS?: NO

## 2023-04-24 ASSESSMENT — GAIT ASSESSMENTS
ASSISTIVE DEVICE: PARALLEL BARS
GAIT LEVEL OF ASSIST: CONTACT GUARD ASSIST
DEVIATION: BRADYKINETIC;DECREASED TOE OFF;DECREASED HEEL STRIKE
DEVIATION: DECREASED BASE OF SUPPORT;BRADYKINETIC
DISTANCE (FEET): 12
DISTANCE (FEET): 250
GAIT LEVEL OF ASSIST: CONTACT GUARD ASSIST

## 2023-04-24 NOTE — THERAPY
Speech Language Pathology  Video Swallow     Patient Name:  Farhan Tanner  AGE:  88 y.o., SEX:  female  Medical Record #:  [id number]  Today's Date: 4/24/2023     Objective       04/24/23 1031   Charge Group   SLP Video Swallow / FEES Videofluoroscopic Evaluation   SLP Total Time Spent   SLP Individual Total Time Spent (Mins) 30   History / Background Information   Prior Level of Function for Eating / Swallowing Unknown   Diagnosis Right sided Intraparenchymal hemorrage   Onset Date Of Dysphagia 4/19/2023   Dysphagia Symptoms Warranting Video Swallow Patient currently on modified diet of (4) puree and (2) mildly thick   Dentition Poor Quality    Procedure   Patient Seated in  Wheel chair   Seated at (Degrees) 90   Views Completed Lateral   Fluoroscopy Time 145 seconds   Oral Phase   Oral Phase X   Ice Chips Oral Residue After the Swallow;Ineffective Mastication   Mildly Thick (2) - (Nectar Thick) Oral Residue After the Swallow   Thin (0) Oral Residue After the Swallow;Premature Spillage Into Valleculae;Premature Spillage Into Pyriform Sinus;Other (See Comments)  (Cup sip- spilled to the pyriform. Straw sip- spilled to the valleculae)   Pureed (4) Oral Residue After the Swallow   Soft & Bite-Sized (6) - (Dysphagia III) Anterior Spillage;Ineffective Mastication;Premature Spillage Into Valleculae;Oral Residue After the Swallow;Other (See Comments)  (piece mealed soft solids)   Regular (7) Ineffective Mastication;Oral Residue After the Swallow;Premature Spillage Into Valleculae   Barium Tablet   (Within functional limits with floated in puree.)   Additional Comments Patient with mild oral dysphagia characterized by oral resdiue after the swallow, anterior spillage, and ineffective chewing (munching with front teeth opposed to rotary motion of chewing with limited molars present). Patient also withfacial droop and reduced sensation on the left side of her face. Pocketing of food noted in the left buccal cavity  "following evaluation.   Pharyngeal Phase   Pharyngeal Phase X   Ice Chips Residue In Valleculae;Reduced Tongue Base Retraction   Mildly Thick (2) - (Nectar Thick) Residue In Valleculae;Reduced Tongue Base Retraction   Thin (0) Residue In Valleculae;Reduced Tongue Base Retraction   Pureed (4)   (Swallow within functional limits)   Soft & Bite-Sized (6) - (Dysphagia III) Residue In Valleculae;Reduced Tongue Base Retraction   Regular (7) Residue In Valleculae;Reduced Tongue Base Retraction   Barium Tablet Residue In Valleculae;Reduced Tongue Base Retraction   Additional Comments Patient with mild pharyngeal dysphagia charaxterized by the residue in the vallecula following swallows due to mildly imapired tongue based retraction. Patient able to clear residue with cleanse swallows.   Compensatory Strategies Attempted   Compensatory Strategies Attempted Yes   Multiple Swallows Effective in clearing oral and pharyngeal residue   Alternate Liquids and Solids Effective in clearing pharyngeal residue   Impression   Dysphagia Present Yes   Oral - Pharyngeal Mild Impairment   Prognosis   Prognosis for Improvement Good   Barriers to Improvement Left sided facial droop and reduced sensation. Pocketing of food.   Positive Indicators for Improvement Motivated to return to eating \"normal food\"   Recommendations   Diet / Liquid Recommendation Other (Comments)  (Trial a tray of (6) soft and bite sized with (0) thin liquids)   Medication Administration  Float Whole with Puree   Strategies / Precautions Small Bites;Small Sips;Multiple Swallows;Finger / Tongue Sweep to Clear Pocketing on the LEFT;Alternate Solids / Liquids;Place Food on RIGHT Side of Mouth;Sitting Upright at 90 Degrees while Eating   Interventions Dysphagia Therapy by SLP;Compensatory Safe Swallow Strategy Training   Additional Recommendations Patient to be observed with trial tray of upgraded textures during T-Dine tomorrow at lunch   SLP Contact Information (Name / " Extension) Nay Cary CCC-SLP         Assessment    Patient seen for Modified Barium Swallow Study on this date to objectively assess her current swallowing function.     Oral phase- mild impairment  Patient with limited molars, left sided facial droop, and reduced sensation to the left side. Orally patient with residue in the oral cavity following swallows. Patient cue to re swallow, effectively clearing residue. She demonstrated a munching pattern of chewing anteriorly compared to the typical posterior rotary motion due to limited molar present. Patient demonstrated inefficient mastication and piecemealed solids, even when presented with small bites. Patient with one incidence of anterior spillage of soft solids after the swallow. Patient to benefit from compensatory strategies given facial droop and reduced sensation. Patient able to safely swallow whole pill floated in puree when placed in the right side of the mouth.     Pharyngeal phase- mild impairment  Patient with mild deficit in tongue based retraction indicated by residue in the vallecula following swallows of all consistencies. Patient cued to swallow, effectively clearing pharyngeal residue.     Overall, patient with good airway protection, but with mild deficit in swallow efficiency. Patient to be seen with therapeutic trial tray of (6) soft and bite sized and (0) thin liquids in T-dine tomorrow to assess swallow over a meal.     Plan    Patient to be seen in T-dine with trial tray of (6) soft and bite sized and (0) thin liquids with ST tomorrow to assess swallowing function over a meal. Use compensatory strategies of alternating liquids and solids, multiple swallows, lingual/finger sweep to the left side, place food the the right side, and attend to the left side of the tray.

## 2023-04-24 NOTE — THERAPY
Occupational Therapy  Daily Treatment     Patient Name: Farhan Tanner  Age:  88 y.o., Sex:  female  Medical Record #: [id number]  Today's Date: 4/24/2023     Precautions  Precautions: Fall Risk  Comments: L neglect, L deepika, aspiration risk    Subjective    Patient in bed upon arrival, agreeable to participate in OT.      Objective     04/24/23 0701   OT Charge Group   OT Self Care / ADL (Units) 2   OT Therapy Activity (Units) 2   OT Total Time Spent   OT Individual Total Time Spent (Mins) 60   Sleep/Wake Cycle   Sleep Observations Alert upon awakening   Functional Level of Assist   Upper Body Dressing Maximal Assist  (to don hospital gown while seated)   Lower Body Dressing Total Assist  (max-total assist to don scrub bottoms, patient able to partially assist with 3/3 tasks however required assist for clothing orientation, thoroughmess, attention to task and cues to attend to L side)   Bed, Chair, Wheelchair Transfer Minimal Assist  (posey bed>w/c, stand pivot)   Outcome Measures   Outcome Measures Utilized Box and Blocks Test   Box and Blocks Test   Right hand blocks moved in 60 seconds 23   Left hand blocks moved in 60 seconds 0   Interdisciplinary Plan of Care Collaboration   Patient Position at End of Therapy Seated;Chair Alarm On  (in T-dine for  breakfast w/ therapy tech supervision)     *** Clock drawing L arm trough provided , letter cancellation task (only missed one, most L), biosected lines correctly, reference clock drawings     Assessment    *** R gaze pref, L field cut? Decresed attention to L side   Strengths: Independent prior level of function, Making steady progress towards goals, Manages pain appropriately, Motivated for self care and independence, Pleasant and cooperative, Willingly participates in therapeutic activities  Barriers: Confused, Decreased endurance, Difficulty following instructions, Fatigue, Generalized weakness, Hemiplegia, Impaired activity tolerance, Impaired balance,  Impulsive, Visual impairment    Plan    ***    Passport items to be completed:  {REHAB PASSPORT:07357}    Occupational Therapy Goals (Active)       Problem: Bathing       Dates: Start: 04/23/23         Goal: STG-Within one week, patient will bathe with mod A overall using AE/DME as needed.       Dates: Start: 04/23/23               Problem: Dressing       Dates: Start: 04/23/23         Goal: STG-Within one week, patient will dress UB with mod A overall using AE/DME as needed.       Dates: Start: 04/23/23            Goal: STG-Within one week, patient will dress LB with mod A overall using AE/DME as needed.       Dates: Start: 04/23/23               Problem: Grooming       Dates: Start: 04/23/23         Goal: STG-Within one week, patient will complete grooming with CGA for standing at sink using AE/DME as needed.       Dates: Start: 04/23/23               Problem: OT Long Term Goals       Dates: Start: 04/23/23         Goal: LTG-By discharge, patient will complete basic self care tasks with supervision-mod I overall using AE/DME as needed.       Dates: Start: 04/23/23            Goal: LTG-By discharge, patient will perform bathroom transfers with supervision-mod I overall using AE/DME as needed.       Dates: Start: 04/23/23               Problem: Toileting       Dates: Start: 04/23/23         Goal: STG-Within one week, patient will complete toileting tasks with mod A overall using AE/DME as needed.       Dates: Start: 04/23/23

## 2023-04-24 NOTE — CARE PLAN
"The patient is Watcher - Medium risk of patient condition declining or worsening    Shift Goals  Clinical Goals: safety    Problem: Bladder / Voiding  Goal: Patient will establish and maintain regular urinary output  Outcome: Progressing     Problem: Fall Risk - Rehab  Goal: Patient will remain free from falls  Note: Siria Concepcion Fall risk Assessment Score: 22    High fall risk Interventions   - Alarming seatbelt  - Bed and strip alarm   - Yellow sign by the door   - Yellow wrist band \"Fall risk\"  - Room near to the nurse station  - Do not leave patient unattended in the bathroom  - Fall risk education provided     "

## 2023-04-24 NOTE — THERAPY
"Physical Therapy   Daily Treatment     Patient Name: Farhan Tanner  Age:  88 y.o., Sex:  female  Medical Record #: [id number]  Today's Date: 4/24/2023     Precautions  Precautions: Fall Risk  Comments: L neglect, L deepika, aspiration risk    Subjective    Pt was seated in w/c at nurse's station upon arrival. Pt was pleasant and agreeable for therapy session.     Objective       04/24/23 0931   Precautions   Precautions Fall Risk   Comments L neglect, L deepika, aspiration risk   Gait Functional Level of Assist    Gait Level Of Assist Contact Guard Assist   Assistive Device Parallel Bars   Distance (Feet) 12   # of Times Distance was Traveled 3   Deviation Decreased Base Of Support;Bradykinetic   Stairs Functional Level of Assist   Level of Assist with Stairs Contact Guard Assist   # of Stairs Climbed 1  (4\" step; 1x4 trials; in // bars)   Stairs Description Extra time;Supervision for safety;Verbal cueing   Standing Lower Body Exercises   Step Up 1 set of 10;Right  (up c/ R; Down c/ L)   Interdisciplinary Plan of Care Collaboration   Patient Position at End of Therapy Seated;Chair Alarm On;Self Releasing Lap Belt Applied  (Left at nurses station)   Strengths & Barriers   Strengths Able to follow instructions;Effective communication skills;Independent prior level of function;Pleasant and cooperative;Willingly participates in therapeutic activities   Barriers Confused;Decreased endurance;Fatigue;Hemiparesis;Home accessibility;Impaired activity tolerance;Impaired balance;Impaired carryover of learning;Impaired insight/denial of deficits;Impaired functional cognition;Impulsive;Limited mobility;Visual impairment     All standing activities included forced use for LUE/ WB.     Assessment    Pt is currently limited by L side inattention, L deepika, and generalized weakness. Pt required consistent VC for L UE sequencing during gait and step training in // bars. Pt prefers a R sided gaze and requires VC for correction. Pt easily " distracted and requires minimal redirection to task.    Strengths: Able to follow instructions, Effective communication skills, Independent prior level of function, Pleasant and cooperative, Willingly participates in therapeutic activities  Barriers: Confused, Decreased endurance, Fatigue, Hemiparesis, Home accessibility, Impaired activity tolerance, Impaired balance, Impaired carryover of learning, Impaired insight/denial of deficits, Impaired functional cognition, Impulsive, Limited mobility, Visual impairment    Plan    Progress gait training using FWW, BLE strength training, static/dynamic balance training, stair training, transfer training, L side attention training    Passport items to be completed:  Get in/out of bed safely, in/out of a vehicle, safely use mobility device, walk or wheel around home/community, navigate up and down stairs, show how to get up/down from the ground, ensure home is accessible, demonstrate HEP, complete caregiver training    Physical Therapy Problems (Active)       Problem: Mobility       Dates: Start: 04/23/23         Goal: STG-Within one week, patient will ambulate household distance 150 ft with no AD vs LRAD and CGA.       Dates: Start: 04/23/23            Goal: STG-Within one week, patient will ambulate up/down a curb with no AD vs LRAD and CGA to facilitate return to community ambulation.       Dates: Start: 04/23/23            Goal: STG-Within one week, patient will ascend and descend 2-3 stairs with CGA and BHR prn.       Dates: Start: 04/23/23               Problem: Mobility Transfers       Dates: Start: 04/23/23         Goal: STG-Within one week, patient will transfer bed to chair with no AD vs LRAD and SBA.       Dates: Start: 04/23/23               Problem: PT-Long Term Goals       Dates: Start: 04/23/23         Goal: LTG-By discharge, patient will ambulate 200 ft x3 indoors/ outdoors with no AD vs LRAD and SPV.       Dates: Start: 04/23/23            Goal: LTG-By  discharge, patient will transfer one surface to another with no AD vs LRAD and SPV.       Dates: Start: 04/23/23            Goal: LTG-By discharge, patient will ambulate up/down 2-3 stairs with SPV, no AD vs LRAD, and no HR to simulate home entrance.       Dates: Start: 04/23/23            Goal: LTG-By discharge, patient will transfer in/out of a car with no AD vs LRAD and SPV after set up.       Dates: Start: 04/23/23

## 2023-04-24 NOTE — PROGRESS NOTES
"Rehab Progress Note     Date of Service: 4/24/2023  Chief Complaint: Follow-up hemorrhagic stroke    Interval Events (Subjective)    Patient seen and examined today in the hallway.  She is sitting in her wheelchair outside the nursing station.  Her daughter Solange is present.  She is aware she has had a stroke causing left-sided weakness.  She denies any changes in her vision.  She denies any headache.  She denies any numbness or tingling.  She required an enclosure bed over the weekend at night due to trying to get out of bed on her own.  Daughter confirms patient was previously healthy.  CODE STATUS discussion with patient and daughter confirms full code but daughter would like to fill out a POLST form prior to discharge.    Per review of records, patient has a history of a left frontal hemorrhagic stroke back in 2021, as well as further left and right frontal hemorrhagic stroke in 2022.    Patient has no other new questions, concerns, or complaints today.     Objective:  VITAL SIGNS: /58   Pulse 81   Temp 36.6 °C (97.9 °F) (Temporal)   Resp 18   Ht 1.6 m (5' 3\")   Wt 63 kg (138 lb 14.2 oz)   SpO2 96%   BMI 24.60 kg/m²   Gen: alert, no apparent distress  CV: Regular rate, regular rhythm  Resp: Clear to auscultation bilaterally  Neuro: Left neglect, left visual field cut, 5/5 MMT on Bl UE and LE    Recent Results (from the past 72 hour(s))   CoV-2, Flu A/B, And RSV by PCR ([employer])    Collection Time: 04/22/23 12:35 PM    Specimen: Respirate   Result Value Ref Range    Influenza virus A RNA Negative Negative    Influenza virus B, PCR Negative Negative    RSV, PCR Negative Negative    SARS-CoV-2 by PCR NotDetected     SARS-CoV-2 Source NP Swab    CBC with Differential    Collection Time: 04/23/23  5:49 AM   Result Value Ref Range    WBC 7.5 4.8 - 10.8 K/uL    RBC 4.17 (L) 4.20 - 5.40 M/uL    Hemoglobin 12.6 12.0 - 16.0 g/dL    Hematocrit 36.3 (L) 37.0 - 47.0 %    MCV 87.1 81.4 - 97.8 fL    MCH 30.2 " 27.0 - 33.0 pg    MCHC 34.7 33.6 - 35.0 g/dL    RDW 41.5 35.9 - 50.0 fL    Platelet Count 176 164 - 446 K/uL    MPV 9.7 9.0 - 12.9 fL    Neutrophils-Polys 72.20 (H) 44.00 - 72.00 %    Lymphocytes 16.50 (L) 22.00 - 41.00 %    Monocytes 8.70 0.00 - 13.40 %    Eosinophils 1.90 0.00 - 6.90 %    Basophils 0.40 0.00 - 1.80 %    Immature Granulocytes 0.30 0.00 - 0.90 %    Nucleated RBC 0.00 /100 WBC    Neutrophils (Absolute) 5.43 2.00 - 7.15 K/uL    Lymphs (Absolute) 1.24 1.00 - 4.80 K/uL    Monos (Absolute) 0.65 0.00 - 0.85 K/uL    Eos (Absolute) 0.14 0.00 - 0.51 K/uL    Baso (Absolute) 0.03 0.00 - 0.12 K/uL    Immature Granulocytes (abs) 0.02 0.00 - 0.11 K/uL    NRBC (Absolute) 0.00 K/uL   Comp Metabolic Panel (CMP)    Collection Time: 04/23/23  5:49 AM   Result Value Ref Range    Sodium 136 135 - 145 mmol/L    Potassium 3.7 3.6 - 5.5 mmol/L    Chloride 103 96 - 112 mmol/L    Co2 23 20 - 33 mmol/L    Anion Gap 10.0 7.0 - 16.0    Glucose 101 (H) 65 - 99 mg/dL    Bun 8 8 - 22 mg/dL    Creatinine 0.41 (L) 0.50 - 1.40 mg/dL    Calcium 8.6 8.5 - 10.5 mg/dL    AST(SGOT) 18 12 - 45 U/L    ALT(SGPT) 14 2 - 50 U/L    Alkaline Phosphatase 51 30 - 99 U/L    Total Bilirubin 1.3 0.1 - 1.5 mg/dL    Albumin 3.3 3.2 - 4.9 g/dL    Total Protein 5.9 (L) 6.0 - 8.2 g/dL    Globulin 2.6 1.9 - 3.5 g/dL    A-G Ratio 1.3 g/dL   Prealbumin    Collection Time: 04/23/23  5:49 AM   Result Value Ref Range    Pre-Albumin 12.6 (L) 18.0 - 38.0 mg/dL   CORRECTED CALCIUM    Collection Time: 04/23/23  5:49 AM   Result Value Ref Range    Correct Calcium 9.2 8.5 - 10.5 mg/dL   ESTIMATED GFR    Collection Time: 04/23/23  5:49 AM   Result Value Ref Range    GFR (CKD-EPI) 94 >60 mL/min/1.73 m 2       Scheduled Medications   Medication Dose Frequency    Pharmacy Consult Request  1 Each PHARMACY TO DOSE    omeprazole  20 mg DAILY    senna-docusate  2 Tablet BID       Current Diet Order   Procedures    Diet Order Diet: Level 4 - Pureed (1:1 Feed, meds crushed in  puree, no straws); Liquid level: Level 2 - Mildly Thick       Radiology  Imaging personally reviewed and interpreted by me.    HISTORY/REASON FOR EXAM:  Altered level of consciousness..     TECHNIQUE/EXAM DESCRIPTION: CT scan of the brain without contrast. Both automated exposure control and Automated adjustment of tube current based on patient size are utilized. 4/19/2023 5:15 PM.     CT scan of the brain was carried out without contrast in the normal manner.     Total DLP: 849 mGy*cm     COMPARISON: MRI brain dated 12/29/2021.     FINDINGS:     There is a 4.7 x 3.3 right frontoparietal hematoma without significant midline shift. Within the left frontal lobe is an area of encephalomalacia with focal areas of hyperdensity likely representing overlying blood products. Small subdural collection of   blood products related the left frontoparietal convexity. There is decreased attenuation in the right frontal lobe was not present on the comparison MR and likely represents infarct.        Visualized paranasal sinuses: There are no air fluid levels seen.     IMPRESSION:        4.7 x 3.3 right frontoparietal hematoma without significant midline shift.     Left frontal lobe encephalomalacia with focal areas of hyperdensity likely represent overlying blood products.     Small left frontoparietal convexity subdural blood collection.     Decreased attenuation in the right frontal lobe was not present on the comparison MRI and likely represents infarct.        Assessment:    This patient is a 88 y.o. female admitted for acute inpatient rehabilitation with Intraparenchymal hemorrhage of brain (HCC).    Problem List/Medical Decision Making and Plan:    Right frontal hemorrhagic stroke  (History of left frontal hemorrhagic stroke in 2021 and 2022)  Etiology secondary to amyloid angiopathy  Left neglect  Left visual field cut  Functional left hemiparesis  Dysphagia  Nondominant  Moderate cognitive impairment  Continue full rehab  program  PT/OT/SLP, 1 hr each discipline, 5 days per week    [facility]patient follow[facility]up with [facility] and , referrals made    Hyperlipidemia    No evidence for older patients that statins prevent heart disease, strokes, or death      Encephalopathy  Continue enclosure bed for safety at night    I certify that the patient currently requires non-pharmacologic restraints. Non-pharmacologic restraints are required to reduce the potential for the patient to harm themselves or others, to limit violent behaviors, and to allow proper assessment and care. I have completed a face to face assessment of this patient on 4/24/2023. Alternative methods including but not limited to de-escalation techniques, redirection, and pharmacologic treatment have been attempted but patient currently remains at risk without restraints. Our staff has been trained on restraints and patient is currently placed in video monitored room.  The need for these restraints is assessed by a rehabilitation physician every 24 hours and use of restraints is minimized when appropriate.  Current diagnosis which requires restraints: Encephalopathy. Current restraints required: Enclosure bed.     GI prophylaxis  Continue omeprazole    DVT prophylaxis  Contraindicated given hemorrhage.   Compression stockings on in am, off in pm.  Increase mobility. Monitor for swelling.  Patient currently walking 250 feet.        Christine Ji M.D.  Physical Medicine and Rehabilitation

## 2023-04-24 NOTE — CARE PLAN
The patient is Stable - Low risk of patient condition declining or worsening    Problem: Knowledge Deficit - Standard  Goal: Patient and family/care givers will demonstrate understanding of plan of care, disease process/condition, diagnostic tests and medications  Outcome: Reviewed POC, all questions answered with pt and family member.      Problem: Fall Risk - Rehab  Goal: Patient will remain free from falls  Outcome: Call light within reach, patient encouraged to use for assistance for any needs and for assistance for safe transferring. Pt in Soma bed for safety.        Shift Goals  Clinical Goals: safety  Patient Goals: safety

## 2023-04-24 NOTE — THERAPY
Physical Therapy   Daily Treatment     Patient Name: Farhan Tanner  Age:  88 y.o., Sex:  female  Medical Record #: [id number]  Today's Date: 4/24/2023     Precautions  Precautions: (P) Fall Risk  Comments: (P) L neglect, L deepika, aspiration risk    Subjective    Pt was very pleasant and agreeable to therapy this afternoon. Pt's daughter was present for most of treatment session.     Objective       04/24/23 1301   Precautions   Precautions Fall Risk   Comments L neglect, L deepika, aspiration risk   Gait Functional Level of Assist    Gait Level Of Assist Contact Guard Assist   Assistive Device None   Distance (Feet) 250   # of Times Distance was Traveled 2  (see note)   Deviation Bradykinetic;Decreased Toe Off;Decreased Heel Strike   Interdisciplinary Plan of Care Collaboration   IDT Collaboration with  Family / Caregiver   Patient Position at End of Therapy Seated;Chair Alarm On;Self Releasing Lap Belt Applied;Family / Friend in Room  (Nursing station)   Collaboration Comments CLOF   Strengths & Barriers   Strengths Able to follow instructions;Effective communication skills;Independent prior level of function;Pleasant and cooperative;Willingly participates in therapeutic activities   Barriers Confused;Decreased endurance;Fatigue;Hemiparesis;Home accessibility;Impaired activity tolerance;Impaired balance;Impaired carryover of learning;Impaired insight/denial of deficits;Impaired functional cognition;Impulsive;Limited mobility;Visual impairment     Gait Training:     250x2 trials of overground and uneven surfaces; Pt required seated rest break; Pt was CGA; PT supported L wrist d/t pt's tendency to disregard; Req'd VC for foot clearance d/t toe drag increasing fall risk; Pt req'd VC for L side neglect and path finding;     125' x 3 trials of flat surfaces around west gym; Pt CGA; Pt req'd VC for path finding and L side attention;     10x1 trial in // bars; SBA; Req'd VC for L hand placement and L side neglect;    Pt  and daughter educated on forced use of L UE and LE;    Assessment    Pt continued to demonstrate good progress with therapy. Pt continues to require consistent VC for path finding and general L side neglect. Pt demonstrates mild L drift during gait, however, when prompted was able to correct and maintain for short periods. Pt required several seated rest breaks and requires VC for sequencing during sitting. Pt continues to primarily be limited by L Kelvin and decreased activity endurance.    Strengths: (P) Able to follow instructions, Effective communication skills, Independent prior level of function, Pleasant and cooperative, Willingly participates in therapeutic activities  Barriers: (P) Confused, Decreased endurance, Fatigue, Hemiparesis, Home accessibility, Impaired activity tolerance, Impaired balance, Impaired carryover of learning, Impaired insight/denial of deficits, Impaired functional cognition, Impulsive, Limited mobility, Visual impairment    Plan    Progress gait training using FWW, BLE strength training, static/dynamic balance training, stair training, transfer training, L side attention training    Passport items to be completed:  Get in/out of bed safely, in/out of a vehicle, safely use mobility device, walk or wheel around home/community, navigate up and down stairs, show how to get up/down from the ground, ensure home is accessible, demonstrate HEP, complete caregiver training    Physical Therapy Problems (Active)       Problem: Mobility       Dates: Start: 04/23/23         Goal: STG-Within one week, patient will ambulate household distance 150 ft with no AD vs LRAD and CGA.       Dates: Start: 04/23/23            Goal: STG-Within one week, patient will ambulate up/down a curb with no AD vs LRAD and CGA to facilitate return to community ambulation.       Dates: Start: 04/23/23            Goal: STG-Within one week, patient will ascend and descend 2-3 stairs with CGA and BHR prn.       Dates: Start:  04/23/23               Problem: Mobility Transfers       Dates: Start: 04/23/23         Goal: STG-Within one week, patient will transfer bed to chair with no AD vs LRAD and SBA.       Dates: Start: 04/23/23               Problem: PT-Long Term Goals       Dates: Start: 04/23/23         Goal: LTG-By discharge, patient will ambulate 200 ft x3 indoors/ outdoors with no AD vs LRAD and SPV.       Dates: Start: 04/23/23            Goal: LTG-By discharge, patient will transfer one surface to another with no AD vs LRAD and SPV.       Dates: Start: 04/23/23            Goal: LTG-By discharge, patient will ambulate up/down 2-3 stairs with SPV, no AD vs LRAD, and no HR to simulate home entrance.       Dates: Start: 04/23/23            Goal: LTG-By discharge, patient will transfer in/out of a car with no AD vs LRAD and SPV after set up.       Dates: Start: 04/23/23

## 2023-04-24 NOTE — THERAPY
Speech Language Pathology  Daily Treatment     Patient Name: Farhan Tanner  Age:  88 y.o., Sex:  female  Medical Record #: [id number]  Today's Date: 4/24/2023     Precautions  Precautions: Fall Risk  Comments: L neglect, L deepika, aspiration risk    Subjective    Assumed care of patient from nurse's station. Pt's daughter accompanied pt to session.      Objective       04/24/23 1434   Treatment Charges   SLP Cognitive Skill Development First 15 Minutes 1   SLP Cognitive Skill Development Additional 15 Minutes 1   SLP Total Time Spent   SLP Individual Total Time Spent (Mins) 30   SCCAN (Scales of Cognitive and Communicative Ability for Neurorehabilitation)   Oral Expression - Raw Score 19   Oral Expression - Scale Performance Score 100   Orientation - Raw Score 10   Orientation - Scale Performance Score 83   Memory - Raw Score 7   Memory - Scale Performance Score 37   Speech Comprehension - Raw Score 12   Speech Comprehension - Scale Performance Score 92   Reading Comprehension - Raw Score 7   Reading Comprehension - Scale Performance Score 58   Writing - Raw Score 5   Writing - Scale Performance Score 71   Attention - Raw Score 9   Attention - Scale Performance Score 56   Problem Solving - Raw Score 18   Problem Solving - Scale Performance Score 78   SCCAN Total Raw Score 68   SCCAN Degree of Severity Moderate Impairment   Interdisciplinary Plan of Care Collaboration   IDT Collaboration with  Family / Caregiver;Other (See Comments)  (unit clerk)   Patient Position at End of Therapy Seated;Self Releasing Lap Belt Applied;Chair Alarm On;Family / Friend in Room   Collaboration Comments pt's dtr accompanied to    Speech Language Pathologist Assigned   Assigned SLP / Extension [id number]/60 cog/sw TDINE         Assessment    Completion of outcome assessment this date with full reporting of pt scores as follows:   SCCAN (Scales of Cognitive and Communicative Ability for Neurorehabilitation)  Oral Expression - Raw Score:  19  Oral Expression - Scale Performance Score: 100  Orientation - Raw Score: 10  Orientation - Scale Performance Score: 83  Memory - Raw Score: 7  Memory - Scale Performance Score: 37  Speech Comprehension - Raw Score: 12  Speech Comprehension - Scale Performance Score: 92  Reading Comprehension - Raw Score: 7  Reading Comprehension - Scale Performance Score: 58  Writing - Raw Score: 5  Writing - Scale Performance Score: 71  Attention - Raw Score: 9  Attention - Scale Performance Score: 56  Problem Solving - Raw Score: 18  Problem Solving - Scale Performance Score: 78  SCCAN Total Raw Score: 68  SCCAN Degree of Severity: Moderate Impairment    Pt presents with severe deficits in memory, attention and reading comprehension. Visual neglect and possible field cut impacting performance on attention and reading comprehension tasks. Pt required MAX cues to attend to SLP on left, tactile and verbal cues to shift gaze, only able to sustain for a few brief seconds before shifting to midline or right.       Strengths: Able to follow instructions, Effective communication skills, Willingly participates in therapeutic activities  Barriers: Aspiration risk, Impaired functional cognition    Plan    Continue to target visual scanning, attention and memory    Passport items to be completed:  Express basic needs, understand food/liquid recommendations, consistently follow swallow precautions, manage finances, manage medications, arrive to therapy appointments on time, complete daily memory log entries, solve problems related to safety situations, review education related to hospitalization, complete caregiver training     Speech Therapy Problems (Active)       Problem: Memory STGs       Dates: Start: 04/23/23         Goal: STG-Within one week, patient will       Dates: Start: 04/23/23       Description: 1) Individualized goal:  recall novel information r/t hospitalization with external memory aids with 80% accuracy and MIN A.   2)  Interventions:  SLP Self Care / ADL Training , SLP Cognitive Skill Development, and SLP Group Treatment                Problem: Problem Solving STGs       Dates: Start: 04/23/23         Goal: STG-Within one week, patient will       Dates: Start: 04/23/23       Description: 1) Individualized goal:  complete administration of the SCCAN with further goals developed as appropriate.  2) Interventions:  SLP Self Care / ADL Training , SLP Cognitive Skill Development, and SLP Group Treatment                  Problem: Social Interaction STGs       Dates: Start: 04/23/23         Goal: STG-Within one week, patient will       Dates: Start: 04/23/23               Problem: Speech/Swallowing LTGs       Dates: Start: 04/23/23         Goal: LTG-By discharge, patient will safely swallow       Dates: Start: 04/23/23       Description: 1) Individualized goal:  the least restrictive diet texture and thin liquids with no overt clinical s/sx of aspiration implementing safe swallow strategies with 90% accuracy and MOD I for a safe D/C home w/ family.   2) Interventions:  SLP Swallowing Dysfunction Treatment, SLP Video Swallow Evaluation, SLP Self Care / ADL Training , and SLP Group Treatment             Goal: LTG-By discharge, patient will solve complex problem       Dates: Start: 04/23/23       Description: 1) Individualized goal:  related to health and safety with 80% accuracy and MOD I for a safe D/C home with family.   2) Interventions:  SLP Self Care / ADL Training , SLP Cognitive Skill Development, and SLP Group Treatment                Problem: Swallowing STGs       Dates: Start: 04/23/23         Goal: STG-Within one week, patient will safely swallow       Dates: Start: 04/23/23       Description: 1) Individualized goal:  pureed textures and mildly thickened liquids w/ no overt clinical s/sx of aspiration in 3/3 meals.   2) Interventions:  SLP Swallowing Dysfunction Treatment, SLP Self Care / ADL Training , and SLP Group  Treatment             Goal: STG-Within one week, patient will       Dates: Start: 04/23/23       Description: 1) Individualized goal:  participate in an instrumental swallow study with goals/diet changes as appropriate.  2) Interventions:  SLP Swallowing Dysfunction Treatment, SLP Video Swallow Evaluation, and SLP Self Care / ADL Training

## 2023-04-24 NOTE — DISCHARGE PLANNING
CASE MANAGEMENT INITIAL ASSESSMENT    Admit Date:  4/22/2023     Case Management has reviewed the medical chart and will meet with patient and family to discuss role of case management / discharge planning / team conference.     Patient is a  88 y.o. female transferred from [facility].    Attending physician: Dr. Ji   PCP: Pretty Kirk     Diagnosis: Trauma [T14.90XA]  Intraparenchymal hemorrhage of brain (HCC) [I61.9]  Intracranial hemorrhage (HCC) [I62.9]    Co-morbidities:   Patient Active Problem List    Diagnosis Date Noted    Hypertension 04/22/2023    Trauma 04/19/2023    Intraparenchymal hemorrhage of brain (HCC) 04/19/2023    Hyperglycemia 06/22/2021    Vasogenic brain edema (HCC) 06/22/2021    Confusion 06/22/2021    Dyslipidemia 02/07/2018    Bilateral hearing loss 09/09/2016     Prior Living Situation:  Housing / Facility: [address] [facility]  Lives with - Patient's Self Care Capacity: Alone and Able to Care For Self    Prior Level of Function:  Medication Management: Independent  Home Management:  (Pt reports daughter A with vaccuming)  Prior Level Of Mobility: Independent Without Device in Home    Support Systems:  Primary : Devin Colindres    Previous Services Utilized:   Equipment Owned: None  Prior Services: Home-Independent    Other Information:  Occupation (Pre-Hospital Vocational): Retired Due To Age  Primary Payor Source: Medicare A, Medicare B  Secondary Payor Source: Other (Comments) (anthem)  Primary Care Practitioner : Pretty Kirk DO    Patient / Family Goal:  Patient / Family Goal:  will meet with patient to discuss goals    Plan:  1. Continue to follow patient through hospitalization and provide discharge planning in collaboration with patient, family, physicians and ancillary services.     2. Utilize community resources to ensure a safe discharge.

## 2023-04-25 ENCOUNTER — APPOINTMENT (OUTPATIENT)
Dept: SPEECH THERAPY | Facility: REHABILITATION | Age: 88
DRG: 056 | End: 2023-04-25
Attending: PHYSICAL MEDICINE & REHABILITATION
Payer: MEDICARE

## 2023-04-25 ENCOUNTER — APPOINTMENT (OUTPATIENT)
Dept: OCCUPATIONAL THERAPY | Facility: REHABILITATION | Age: 88
End: 2023-04-25
Attending: PHYSICAL MEDICINE & REHABILITATION
Payer: MEDICARE

## 2023-04-25 ENCOUNTER — APPOINTMENT (OUTPATIENT)
Dept: PHYSICAL THERAPY | Facility: REHABILITATION | Age: 88
DRG: 056 | End: 2023-04-25
Attending: PHYSICAL MEDICINE & REHABILITATION
Payer: MEDICARE

## 2023-04-25 LAB
T4 FREE SERPL-MCNC: 0.95 NG/DL (ref 0.93–1.7)
TSH SERPL DL<=0.005 MIU/L-ACNC: 5.7 UIU/ML (ref 0.38–5.33)

## 2023-04-25 PROCEDURE — 99232 SBSQ HOSP IP/OBS MODERATE 35: CPT | Performed by: PHYSICAL MEDICINE & REHABILITATION

## 2023-04-25 PROCEDURE — 84439 ASSAY OF FREE THYROXINE: CPT

## 2023-04-25 PROCEDURE — 770010 HCHG ROOM/CARE - REHAB SEMI PRIVAT*

## 2023-04-25 PROCEDURE — 36415 COLL VENOUS BLD VENIPUNCTURE: CPT

## 2023-04-25 PROCEDURE — 700102 HCHG RX REV CODE 250 W/ 637 OVERRIDE(OP): Performed by: PHYSICAL MEDICINE & REHABILITATION

## 2023-04-25 PROCEDURE — 97535 SELF CARE MNGMENT TRAINING: CPT

## 2023-04-25 PROCEDURE — 97116 GAIT TRAINING THERAPY: CPT

## 2023-04-25 PROCEDURE — A9270 NON-COVERED ITEM OR SERVICE: HCPCS | Performed by: PHYSICAL MEDICINE & REHABILITATION

## 2023-04-25 PROCEDURE — 92526 ORAL FUNCTION THERAPY: CPT

## 2023-04-25 PROCEDURE — 84443 ASSAY THYROID STIM HORMONE: CPT

## 2023-04-25 PROCEDURE — 97112 NEUROMUSCULAR REEDUCATION: CPT

## 2023-04-25 RX ADMIN — SENNOSIDES AND DOCUSATE SODIUM 2 TABLET: 50; 8.6 TABLET ORAL at 07:58

## 2023-04-25 RX ADMIN — OMEPRAZOLE 20 MG: 20 CAPSULE, DELAYED RELEASE ORAL at 07:58

## 2023-04-25 RX ADMIN — SENNOSIDES AND DOCUSATE SODIUM 2 TABLET: 50; 8.6 TABLET ORAL at 20:48

## 2023-04-25 ASSESSMENT — GAIT ASSESSMENTS
GAIT LEVEL OF ASSIST: MINIMAL ASSIST
DISTANCE (FEET): 150

## 2023-04-25 ASSESSMENT — PAIN DESCRIPTION - PAIN TYPE: TYPE: ACUTE PAIN

## 2023-04-25 NOTE — THERAPY
Occupational Therapy  Daily Treatment     Patient Name: Farhan Tanner  Age:  88 y.o., Sex:  female  Medical Record #: [id number]  Today's Date: 4/25/2023     Precautions  Precautions: Fall Risk  Comments: L neglect, L deepika, aspiration risk    Safety   Comments: See functional levels of assist for ADL performance details    Subjective    Patient seated in w/c upon arrival w/ adult children present (2 sons and daughter). Agreeable to participate in OT.      Objective     04/25/23 1331   OT Charge Group   OT Self Care / ADL (Units) 4   OT Total Time Spent   OT Individual Total Time Spent (Mins) 60   Precautions   Precautions Fall Risk   Comments L neglect, L deepika, aspiration risk   Safety    Comments See functional levels of assist for ADL performance details   Vitals   O2 Delivery Device None - Room Air   Sleep/Wake Cycle   Sleep & Rest Awake   Functional Level of Assist   Bathing Moderate Assist  (assist required for attention to L side of body, thoroughness with washing/drying RUE (due to L deepika), standing balance)   Upper Body Dressing Moderate Assist  (for threading UEs through long sleeve gown)   Lower Body Dressing Maximal Assist  (to don non skid socks while seated in w/c)   Toileting Moderate Assist  (for doffing L side of pants/disposable briefs pre-voiding, donning of LB garments not completed following toileting due to patient completing shower following toileting tasks)   Bed, Chair, Wheelchair Transfer Minimal Assist  (wc > posey bed)   Bed Mobility    Sit to Supine Standby Assist   Scooting Standby Assist   Rolling Standby Assist   Interdisciplinary Plan of Care Collaboration   Patient Position at End of Therapy In Bed;Call Light within Reach       Assessment    Patient tolerated OT session well with focus on progression with ADLs. Patient requires assist w/ attending to and functionally incorporating LUE into ADL routine. Bath mitt provided at end of session; recommending hands on trial during next  shower to determine if improves functional independence w/ bathing tasks.   Strengths: Independent prior level of function, Making steady progress towards goals, Manages pain appropriately, Motivated for self care and independence, Pleasant and cooperative, Willingly participates in therapeutic activities  Barriers: Confused, Decreased endurance, Difficulty following instructions, Fatigue, Generalized weakness, Hemiplegia, Impaired activity tolerance, Impaired balance, Impulsive, Visual impairment    Plan    Trial bath mitt during next shower to assess whether improves functional independence w/ bathing tasks.     Trial alternative arm tray/trough to maximize RUE safety/positioning.     ADLs, LUE functional use/strengthening    Occupational Therapy Goals (Active)       Problem: Bathing       Dates: Start: 04/23/23         Goal: STG-Within one week, patient will bathe with mod A overall using AE/DME as needed.       Dates: Start: 04/23/23               Problem: Dressing       Dates: Start: 04/23/23         Goal: STG-Within one week, patient will dress UB with mod A overall using AE/DME as needed.       Dates: Start: 04/23/23            Goal: STG-Within one week, patient will dress LB with mod A overall using AE/DME as needed.       Dates: Start: 04/23/23               Problem: Grooming       Dates: Start: 04/23/23         Goal: STG-Within one week, patient will complete grooming with CGA for standing at sink using AE/DME as needed.       Dates: Start: 04/23/23               Problem: OT Long Term Goals       Dates: Start: 04/23/23         Goal: LTG-By discharge, patient will complete basic self care tasks with supervision-mod I overall using AE/DME as needed.       Dates: Start: 04/23/23            Goal: LTG-By discharge, patient will perform bathroom transfers with supervision-mod I overall using AE/DME as needed.       Dates: Start: 04/23/23               Problem: Toileting       Dates: Start: 04/23/23         Goal:  STG-Within one week, patient will complete toileting tasks with mod A overall using AE/DME as needed.       Dates: Start: 04/23/23

## 2023-04-25 NOTE — CARE PLAN
Problem: Pain - Standard  Goal: Alleviation of pain or a reduction in pain to the patient’s comfort goal  Note: Pt able to participate in therapies and activities this shift.      Problem: Infection  Goal: Patient will remain free from infection  Note: Patient remains free from s/s infection; afebrile.  Will continue to monitor.    The patient is Watcher - Medium risk of patient condition declining or worsening    Shift Goals  Clinical Goals: Safety  Patient Goals: Safety  Family Goals: support

## 2023-04-25 NOTE — THERAPY
"Physical Therapy   Daily Treatment     Patient Name: Farhan Tanner  Age:  88 y.o., Sex:  female  Medical Record #: [id number]  Today's Date: 4/25/2023     Precautions  Precautions: Fall Risk  Comments: L neglect, L deepika, aspiration risk    Subjective    \"I need a tissue\"  Pt impulsive and got up unsolicited.     Objective       04/25/23 0901   Gait Functional Level of Assist    Gait Level Of Assist Minimal Assist   Assistive Device None   Distance (Feet) 150  (ambulatory throughout session various distances (') w/o rest breaks)   # of Times Distance was Traveled 1   Deviation Ataxic;Decreased Heel Strike;Bradykinetic  (path deviation and numerous near LOB to L)   Bed Mobility    Sit to Stand Standby Assist   Scooting Standby Assist   Neuro-Muscular Treatments   Neuro-Muscular Treatments Facilitation;Tactile Cuing;Verbal Cuing;Weight Shift Right;Weight Shift Left  (dynamic standing balance)   Interdisciplinary Plan of Care Collaboration   Patient Position at End of Therapy Seated;Chair Alarm On;Self Releasing Lap Belt Applied  (nursing station)     Dynamic standing balance  Side stepping over obstacle BUE support Noemy 3 x 5 bilaterally  Tandem walking around raised mat unilateral UE support Noemy 40'  Balloon volley in parallel bars w/o UE support Noemy using only LUE  Dual task ambulation outside passing ball to LUE Noemy    Assessment    Pt demonstrates good activity tolerance and endurance. Her safety awareness is limited by L sided neglect and impulsivity; she frequently loses her balance to the L but demonstrates reactive stepping strategies while still requiring physical assistance to regain balance. She shows inability to dual task with ambulation.  Strengths: Able to follow instructions, Effective communication skills, Independent prior level of function, Pleasant and cooperative, Willingly participates in therapeutic activities  Barriers: Confused, Decreased endurance, Fatigue, Hemiparesis, Home " accessibility, Impaired activity tolerance, Impaired balance, Impaired carryover of learning, Impaired insight/denial of deficits, Impaired functional cognition, Impulsive, Limited mobility, Visual impairment    Plan    Progress gait training using FWW, BLE strength training, static/dynamic balance training, stair training, transfer training, L side attention training, incorporate LUE     Passport items to be completed:  Get in/out of bed safely, in/out of a vehicle, safely use mobility device, walk or wheel around home/community, navigate up and down stairs, show how to get up/down from the ground, ensure home is accessible, demonstrate HEP, complete caregiver training        Physical Therapy Problems (Active)       Problem: Mobility       Dates: Start: 04/23/23         Goal: STG-Within one week, patient will ambulate household distance 150 ft with no AD vs LRAD and CGA.       Dates: Start: 04/23/23            Goal: STG-Within one week, patient will ambulate up/down a curb with no AD vs LRAD and CGA to facilitate return to community ambulation.       Dates: Start: 04/23/23            Goal: STG-Within one week, patient will ascend and descend 2-3 stairs with CGA and BHR prn.       Dates: Start: 04/23/23               Problem: Mobility Transfers       Dates: Start: 04/23/23         Goal: STG-Within one week, patient will transfer bed to chair with no AD vs LRAD and SBA.       Dates: Start: 04/23/23               Problem: PT-Long Term Goals       Dates: Start: 04/23/23         Goal: LTG-By discharge, patient will ambulate 200 ft x3 indoors/ outdoors with no AD vs LRAD and SPV.       Dates: Start: 04/23/23            Goal: LTG-By discharge, patient will transfer one surface to another with no AD vs LRAD and SPV.       Dates: Start: 04/23/23            Goal: LTG-By discharge, patient will ambulate up/down 2-3 stairs with SPV, no AD vs LRAD, and no HR to simulate home entrance.       Dates: Start: 04/23/23             Goal: LTG-By discharge, patient will transfer in/out of a car with no AD vs LRAD and SPV after set up.       Dates: Start: 04/23/23

## 2023-04-25 NOTE — PROGRESS NOTES
"Rehab Progress Note     Date of Service: 4/25/2023  Chief Complaint: Follow-up hemorrhagic stroke    Interval Events (Subjective)    Patient seen and evaluated in the hallway by the nursing station.  She confirmed a history of previous brain bleeding and reports she did not have any residual impairments from this.  She denies any pain or headache.   TSH mildly elevated, with normal T4.     Patient has no other new questions, concerns, or complaints today.       Objective:  VITAL SIGNS: /60   Pulse 75   Temp 36.8 °C (98.2 °F) (Oral)   Resp 18   Ht 1.6 m (5' 3\")   Wt 63 kg (138 lb 14.2 oz)   SpO2 95%   BMI 24.60 kg/m²   Gen: alert, no apparent distress  CV: Regular rate, regular rhythm  Resp: Clear to auscultation bilaterally  Neuro: left visual field cut, left neglect    Recent Results (from the past 72 hour(s))   CoV-2, Flu A/B, And RSV by PCR (Tutor Assignment)    Collection Time: 04/22/23 12:35 PM    Specimen: Respirate   Result Value Ref Range    Influenza virus A RNA Negative Negative    Influenza virus B, PCR Negative Negative    RSV, PCR Negative Negative    SARS-CoV-2 by PCR NotDetected     SARS-CoV-2 Source NP Swab    CBC with Differential    Collection Time: 04/23/23  5:49 AM   Result Value Ref Range    WBC 7.5 4.8 - 10.8 K/uL    RBC 4.17 (L) 4.20 - 5.40 M/uL    Hemoglobin 12.6 12.0 - 16.0 g/dL    Hematocrit 36.3 (L) 37.0 - 47.0 %    MCV 87.1 81.4 - 97.8 fL    MCH 30.2 27.0 - 33.0 pg    MCHC 34.7 33.6 - 35.0 g/dL    RDW 41.5 35.9 - 50.0 fL    Platelet Count 176 164 - 446 K/uL    MPV 9.7 9.0 - 12.9 fL    Neutrophils-Polys 72.20 (H) 44.00 - 72.00 %    Lymphocytes 16.50 (L) 22.00 - 41.00 %    Monocytes 8.70 0.00 - 13.40 %    Eosinophils 1.90 0.00 - 6.90 %    Basophils 0.40 0.00 - 1.80 %    Immature Granulocytes 0.30 0.00 - 0.90 %    Nucleated RBC 0.00 /100 WBC    Neutrophils (Absolute) 5.43 2.00 - 7.15 K/uL    Lymphs (Absolute) 1.24 1.00 - 4.80 K/uL    Monos (Absolute) 0.65 0.00 - 0.85 K/uL    Eos " (Absolute) 0.14 0.00 - 0.51 K/uL    Baso (Absolute) 0.03 0.00 - 0.12 K/uL    Immature Granulocytes (abs) 0.02 0.00 - 0.11 K/uL    NRBC (Absolute) 0.00 K/uL   Comp Metabolic Panel (CMP)    Collection Time: 04/23/23  5:49 AM   Result Value Ref Range    Sodium 136 135 - 145 mmol/L    Potassium 3.7 3.6 - 5.5 mmol/L    Chloride 103 96 - 112 mmol/L    Co2 23 20 - 33 mmol/L    Anion Gap 10.0 7.0 - 16.0    Glucose 101 (H) 65 - 99 mg/dL    Bun 8 8 - 22 mg/dL    Creatinine 0.41 (L) 0.50 - 1.40 mg/dL    Calcium 8.6 8.5 - 10.5 mg/dL    AST(SGOT) 18 12 - 45 U/L    ALT(SGPT) 14 2 - 50 U/L    Alkaline Phosphatase 51 30 - 99 U/L    Total Bilirubin 1.3 0.1 - 1.5 mg/dL    Albumin 3.3 3.2 - 4.9 g/dL    Total Protein 5.9 (L) 6.0 - 8.2 g/dL    Globulin 2.6 1.9 - 3.5 g/dL    A-G Ratio 1.3 g/dL   Prealbumin    Collection Time: 04/23/23  5:49 AM   Result Value Ref Range    Pre-Albumin 12.6 (L) 18.0 - 38.0 mg/dL   CORRECTED CALCIUM    Collection Time: 04/23/23  5:49 AM   Result Value Ref Range    Correct Calcium 9.2 8.5 - 10.5 mg/dL   ESTIMATED GFR    Collection Time: 04/23/23  5:49 AM   Result Value Ref Range    GFR (CKD-EPI) 94 >60 mL/min/1.73 m 2   TSH WITH REFLEX TO FT4    Collection Time: 04/25/23  5:57 AM   Result Value Ref Range    TSH 5.700 (H) 0.380 - 5.330 uIU/mL       Scheduled Medications   Medication Dose Frequency    omeprazole  20 mg DAILY    senna-docusate  2 Tablet BID       Current Diet Order   Procedures    Diet Order Diet: Level 5 - Minced and Moist (meds crushed in puree, no straws); Liquid level: Level 0 - Thin       Assessment:    This patient is a 88 y.o. female admitted for acute inpatient rehabilitation with Intraparenchymal hemorrhage of brain (HCC).    Problem List/Medical Decision Making and Plan:    Right frontal hemorrhagic stroke  (History of left frontal hemorrhagic stroke in 2021 and 2022)  Etiology secondary to amyloid angiopathy  Left neglect  Left visual field cut  Functional left  hemiparesis  Dysphagia  Nondominant  Moderate cognitive impairment  Continue full rehab program  PT/OT/SLP, 1 hr each discipline, 5 days per week    Outpatient follow-up with [facility] and , referrals made    Hyperlipidemia    No evidence for older patients that statins prevent heart disease, strokes, or death    Encephalopathy  Continue enclosure bed for safety at night    I certify that the patient currently requires non-pharmacologic restraints. Non-pharmacologic restraints are required to reduce the potential for the patient to harm themselves or others, to limit violent behaviors, and to allow proper assessment and care. I have completed a face to face assessment of this patient on 4/25/2023. Alternative methods including but not limited to de-escalation techniques, redirection, and pharmacologic treatment have been attempted but patient currently remains at risk without restraints. Our staff has been trained on restraints and patient is currently placed in video monitored room.  The need for these restraints is assessed by a rehabilitation physician every 24 hours and use of restraints is minimized when appropriate.  Current diagnosis which requires restraints: encephalopathy. Current restraints required: Enclosure bed.     GI prophylaxis  Continue omeprazole    Abnormal thyroid studies  TSH mildly elevated at 5.7, normal T4  Outpatient follow up with PCP for repeat studies in 4-6 weeks    Bowels  Continue Senokot  Last bowel movement 4/24    Bladder  With mild retention on admission  Continue to check PVRs  IC for over 400 CC  Monitor need to start tamsulosin     DVT prophylaxis  Contraindicated given hemorrhage.   Compression stockings on in am, off in pm.  Increase mobility. Monitor for swelling.  Patient currently walking 250 feet.      Christine Ji M.D.  Physical Medicine and Rehabilitation

## 2023-04-25 NOTE — THERAPY
Speech Language Pathology  Daily Treatment     Patient Name: Farhan Tanner  Age:  88 y.o., Sex:  female  Medical Record #: [id number]  Today's Date: 4/25/2023     Precautions  Precautions: Fall Risk  Comments: L neglect, L deepika, aspiration risk    Subjective    Assumed care of patient from nurses station with son, Morgan present. Pt's 2 other children (Devin and Solange) joining in dining room for remainder of session. Pt agreeable to session.      Objective       04/25/23 1104   Treatment Charges   Charges Yes   SLP Swallowing Dysfunction Treatment Swallowing Dysfunction Treatment   SLP Total Time Spent   SLP Individual Total Time Spent (Mins) 60   Dysphagia    Oral / Pharyngeal / Laryngeal Exercises Buccal Exercises;Labial Exercises   Other Treatments review of MBSS   Diet / Liquid Recommendation Soft & Bite-Sized (6) - (Dysphagia III);Thin (0)   Nutritional Liquid Intake Rating Scale Non thickened beverages   Nutritional Food Intake Rating Scale Total oral diet with multiple consistencies without special preparation but with specific food limitations   Interdisciplinary Plan of Care Collaboration   IDT Collaboration with  Family / Caregiver;Therapy Tech   Patient Position at End of Therapy Seated;Self Releasing Lap Belt Applied;Family / Friend in Room   Collaboration Comments 2 sons and dtr attending therapy session. Informed therapy tech of pt location in main dining room with family         Assessment    Review of MBSS with pt and pt's son, Morgan. Education provided on swallow function, review of swallow strategies including serial swallows, alternating solids and liquids, squeezing lips/cheeks against teeth to complete effortful swallow, as well as checking for pocketing on left. Use of mirror during meals helpful for self monitoring anterior spillage on left. Pt continues to benefit from cues to scan environment on left to locate items on tray as well as family/therapist on left side. Anterior spillage noted  with trials of 6- Soft & Bite Sized and thin liquids. Pt benefits from cues to look in mirror, complete lingual or finger sweep to clear. Recommend advance to 6- Soft & Bite Sized, thin liquids, meds whole floated 1:1. Trials of regular textures with SLP prior to advancement. Pt and family confirmed understanding of education provided this session.     Strengths: Able to follow instructions, Effective communication skills, Willingly participates in therapeutic activities  Barriers: Aspiration risk, Impaired functional cognition    Plan    Advance to 6- Soft & Bite Sized, thin liquids. Meds whole 1:1 floated. Trials of regular textures.     Passport items to be completed:  Express basic needs, understand food/liquid recommendations, consistently follow swallow precautions, manage finances, manage medications, arrive to therapy appointments on time, complete daily memory log entries, solve problems related to safety situations, review education related to hospitalization, complete caregiver training     Speech Therapy Problems (Active)       Problem: Memory STGs       Dates: Start: 04/23/23         Goal: STG-Within one week, patient will       Dates: Start: 04/23/23       Description: 1) Individualized goal:  recall novel information r/t hospitalization with external memory aids with 80% accuracy and MIN A.   2) Interventions:  SLP Self Care / ADL Training , SLP Cognitive Skill Development, and SLP Group Treatment                Problem: Problem Solving STGs       Dates: Start: 04/23/23         Goal: STG-Within one week, patient will       Dates: Start: 04/23/23       Description: 1) Individualized goal:  complete administration of the SCCAN with further goals developed as appropriate.  2) Interventions:  SLP Self Care / ADL Training , SLP Cognitive Skill Development, and SLP Group Treatment                  Problem: Social Interaction STGs       Dates: Start: 04/23/23         Goal: STG-Within one week, patient will        Dates: Start: 04/23/23               Problem: Speech/Swallowing LTGs       Dates: Start: 04/23/23         Goal: LTG-By discharge, patient will safely swallow       Dates: Start: 04/23/23       Description: 1) Individualized goal:  the least restrictive diet texture and thin liquids with no overt clinical s/sx of aspiration implementing safe swallow strategies with 90% accuracy and MOD I for a safe D/C home w/ family.   2) Interventions:  SLP Swallowing Dysfunction Treatment, SLP Video Swallow Evaluation, SLP Self Care / ADL Training , and SLP Group Treatment             Goal: LTG-By discharge, patient will solve complex problem       Dates: Start: 04/23/23       Description: 1) Individualized goal:  related to health and safety with 80% accuracy and MOD I for a safe D/C home with family.   2) Interventions:  SLP Self Care / ADL Training , SLP Cognitive Skill Development, and SLP Group Treatment                Problem: Swallowing STGs       Dates: Start: 04/23/23         Goal: STG-Within one week, patient will safely swallow       Dates: Start: 04/23/23       Description: 1) Individualized goal:  pureed textures and mildly thickened liquids w/ no overt clinical s/sx of aspiration in 3/3 meals.   2) Interventions:  SLP Swallowing Dysfunction Treatment, SLP Self Care / ADL Training , and SLP Group Treatment             Goal: STG-Within one week, patient will       Dates: Start: 04/23/23       Description: 1) Individualized goal:  participate in an instrumental swallow study with goals/diet changes as appropriate.  2) Interventions:  SLP Swallowing Dysfunction Treatment, SLP Video Swallow Evaluation, and SLP Self Care / ADL Training

## 2023-04-26 ENCOUNTER — APPOINTMENT (OUTPATIENT)
Dept: SPEECH THERAPY | Facility: REHABILITATION | Age: 88
DRG: 056 | End: 2023-04-26
Attending: PHYSICAL MEDICINE & REHABILITATION
Payer: MEDICARE

## 2023-04-26 ENCOUNTER — APPOINTMENT (OUTPATIENT)
Dept: OCCUPATIONAL THERAPY | Facility: REHABILITATION | Age: 88
DRG: 056 | End: 2023-04-26
Attending: PHYSICAL MEDICINE & REHABILITATION
Payer: MEDICARE

## 2023-04-26 ENCOUNTER — APPOINTMENT (OUTPATIENT)
Dept: PHYSICAL THERAPY | Facility: REHABILITATION | Age: 88
DRG: 056 | End: 2023-04-26
Attending: PHYSICAL MEDICINE & REHABILITATION
Payer: MEDICARE

## 2023-04-26 PROCEDURE — 97530 THERAPEUTIC ACTIVITIES: CPT

## 2023-04-26 PROCEDURE — A9270 NON-COVERED ITEM OR SERVICE: HCPCS | Performed by: PHYSICAL MEDICINE & REHABILITATION

## 2023-04-26 PROCEDURE — 770010 HCHG ROOM/CARE - REHAB SEMI PRIVAT*

## 2023-04-26 PROCEDURE — 97535 SELF CARE MNGMENT TRAINING: CPT

## 2023-04-26 PROCEDURE — 700102 HCHG RX REV CODE 250 W/ 637 OVERRIDE(OP): Performed by: PHYSICAL MEDICINE & REHABILITATION

## 2023-04-26 PROCEDURE — 97112 NEUROMUSCULAR REEDUCATION: CPT

## 2023-04-26 PROCEDURE — 99232 SBSQ HOSP IP/OBS MODERATE 35: CPT | Performed by: PHYSICAL MEDICINE & REHABILITATION

## 2023-04-26 PROCEDURE — 97130 THER IVNTJ EA ADDL 15 MIN: CPT

## 2023-04-26 PROCEDURE — 92526 ORAL FUNCTION THERAPY: CPT

## 2023-04-26 PROCEDURE — 97116 GAIT TRAINING THERAPY: CPT

## 2023-04-26 PROCEDURE — 97129 THER IVNTJ 1ST 15 MIN: CPT

## 2023-04-26 RX ADMIN — SENNOSIDES AND DOCUSATE SODIUM 2 TABLET: 50; 8.6 TABLET ORAL at 08:44

## 2023-04-26 RX ADMIN — OMEPRAZOLE 20 MG: 20 CAPSULE, DELAYED RELEASE ORAL at 08:43

## 2023-04-26 ASSESSMENT — GAIT ASSESSMENTS
GAIT LEVEL OF ASSIST: MINIMAL ASSIST
ASSISTIVE DEVICE: SINGLE POINT CANE;NONE
DEVIATION: ATAXIC;DECREASED BASE OF SUPPORT;BRADYKINETIC
DISTANCE (FEET): 200

## 2023-04-26 NOTE — THERAPY
Occupational Therapy  Daily Treatment     Patient Name: Farhan Tanner  Age:  88 y.o., Sex:  female  Medical Record #: [id number]  Today's Date: 4/26/2023     Precautions  Precautions: Fall Risk  Comments: L neglect, L deepika, aspiration risk    Safety   Comments: See functional levels of assist for ADL performance details    Subjective    Patient seated in w/c upon arrival, agreeable to participate in OT.      Objective     04/26/23 0901   OT Charge Group   OT Self Care / ADL (Units) 1   OT Neuromuscular Re-education / Balance (Units) 1   OT Therapy Activity (Units) 2   OT Total Time Spent   OT Individual Total Time Spent (Mins) 60   Vitals   O2 Delivery Device None - Room Air   Cognition    Level of Consciousness Alert   Sleep/Wake Cycle   Sleep & Rest Awake   Functional Level of Assist   Grooming Moderate Assist  (for attention to L side when washing hands (seated @ w/c))   Toileting Maximal Assist  (to don and off L side of briefs and pants (in standing), patient able to complete hygiene pursuit following BM)   Toilet Transfers Minimal Assist  (stand pivot, wc <> toilet)   Interdisciplinary Plan of Care Collaboration   IDT Collaboration with  Nursing   Patient Position at End of Therapy Seated;Self Releasing Lap Belt Applied   Collaboration Comments Transferred care to RN @ nursing station     UE therex EOM w/ 2# weighted dowel, LUE ace wrapped to facilitate hand flexion and attention to L side, mirror placed in front of patient to maximize visual feedback; patient completed 1 set of 10 of the following: shoulder flexion, shoulder circles (clockwise/counter clockwise), chest press)    Min-CGA for wc <> mat txfr     While seated EOM and using LUE, patient crossed midline to retrieve koosh ball and place each one in container. Heavy cues provided for attention to L side/to actively incorporate    Assessment    Patient tolerated OT session well with focus on ADLs and LUE neuro re-ed. Patient demo's good proximal  and fair distal LUE strength however has difficulty w/ attending to/incorporating LUE into functional tasks.   Strengths: Independent prior level of function, Making steady progress towards goals, Manages pain appropriately, Motivated for self care and independence, Pleasant and cooperative, Willingly participates in therapeutic activities  Barriers: Confused, Decreased endurance, Difficulty following instructions, Fatigue, Generalized weakness, Hemiplegia, Impaired activity tolerance, Impaired balance, Impulsive, Visual impairment    Plan    Trial bath mitt during next shower to assess whether improves functional independence w/ bathing tasks.      Trial alternative arm tray/trough to maximize RUE safety/positioning.      ADLs, LUE functional use/strengthening    Occupational Therapy Goals (Active)       Problem: Dressing       Dates: Start: 04/23/23         Goal: STG-Within one week, patient will dress LB with mod A overall using AE/DME as needed.       Dates: Start: 04/23/23               Problem: Grooming       Dates: Start: 04/23/23         Goal: STG-Within one week, patient will complete grooming with CGA for standing at sink using AE/DME as needed.       Dates: Start: 04/23/23               Problem: OT Long Term Goals       Dates: Start: 04/23/23         Goal: LTG-By discharge, patient will complete basic self care tasks with supervision-mod I overall using AE/DME as needed.       Dates: Start: 04/23/23            Goal: LTG-By discharge, patient will perform bathroom transfers with supervision-mod I overall using AE/DME as needed.       Dates: Start: 04/23/23               Problem: Toileting       Dates: Start: 04/23/23         Goal: STG-Within one week, patient will complete toileting tasks with mod A overall using AE/DME as needed.       Dates: Start: 04/23/23

## 2023-04-26 NOTE — THERAPY
Speech Language Pathology  Daily Treatment     Patient Name: Farhan Tanner  Age:  88 y.o., Sex:  female  Medical Record #: [id number]  Today's Date: 4/26/2023     Precautions  Precautions: Fall Risk  Comments: L neglect, L deepika, aspiration risk    Subjective    Patient agreeable to therapy. Patient's son with her at arrival. He declined joining in on her therapy today.      Objective       04/26/23 1101   Treatment Charges   SLP Swallowing Dysfunction Treatment Swallowing Dysfunction Treatment   SLP Cognitive Skill Development First 15 Minutes 1   SLP Cognitive Skill Development Additional 15 Minutes 1   SLP Total Time Spent   SLP Individual Total Time Spent (Mins) 60   Cosignature   Documentation Review Approved   Cognition   Simple Attention Minimal (4)   Visual Scanning / Cancellation Skills Minimal (4)   Dysphagia    Positioning / Behavior Modification Self Monitoring;Modulate Rate or Bite Size;Multiple Swallows;Alternate Solids and Liquids;Other (see Comments)  (Place food to the right side. Attend to Left side while eating. Remove pocketing of food in the left buccal cavity with lingual or finger sweep.)   Diet / Liquid Recommendation Soft & Bite-Sized (6) - (Dysphagia III);Thin (0)         Assessment    Targeted patients left side inattention with visual scanning tasks. She engaged in cancellation of single targets initially with 77% acc independently with mod A to improve across two tasks. Patient given set up (visual anchor on the left side) and educated on moving row by row and looking at every letter/number/object. After set up and education patient performed single target cancellation with 100% independently. Next session, attempt to fade out visual anchor and have patient self monitor her attention to her left side with visual scanning tasks.     Patient seen at lunch in T-dine with (7) regular textures with (0) thin liquids. Patient required set up prior to consumption. She was observed to take  large bites, drink water prior to attempting to swallow solids, and pay little attention to her left side despite mirror. She initially drank large sips eliciting a cough and runny nose. As lunch progressed and verbal cueing, she took smaller sips. Patient required mod A to swallow before drinking, attend to her left side during breakfast, place food on her right side, and clearing pocketing of her left buccal cavity. She demonstrated reduced awareness to deficit as she did not notice a different in chewing when placing food on her left to her right. Patient reported to have an easier time with breakfast this morning. At this time upgrade in diet not recommended. Continue to trial upgraded textures with ST.     Strengths: Pleasant and cooperative, Supportive family, Willingly participates in therapeutic activities, Motivated for self care and independence, Independent prior level of function  Barriers: Impaired functional cognition, Impaired insight/denial of deficits, Impaired carryover of learning    Plan    Continue targeting memory, visual scanning/cancellation, using compensatory swallowing strategies, and trial trays of upgraded textures.     Passport items to be completed:  Express basic needs, understand food/liquid recommendations, consistently follow swallow precautions, manage finances, manage medications, arrive to therapy appointments on time, complete daily memory log entries, solve problems related to safety situations, review education related to hospitalization, complete caregiver training     Speech Therapy Problems (Active)       Problem: Memory STGs       Dates: Start: 04/23/23         Goal: STG-Within one week, patient will       Dates: Start: 04/23/23       Description: 1) Individualized goal:  recall novel information r/t hospitalization with external memory aids with 80% accuracy and MIN A.   2) Interventions:  SLP Self Care / ADL Training , SLP Cognitive Skill Development, and SLP Group  Treatment          Goal Note filed on 04/25/23 1705 by Gayla Dill MS,CCC-SLP       Will continue to target.                  Problem: Problem Solving STGs       Dates: Start: 04/25/23         Goal: STG-Within one week, patient will complete visual scanning/cancellation tasks with adherence to left margin/visual field to achieve 80% accuracy provided MOD A       Dates: Start: 04/25/23            Goal: STG-Within one week, patient will perform single target visual scanning tasks with 100% with min-set up cuing and use of compensatory strategies.       Dates: Start: 04/26/23               Problem: Speech/Swallowing LTGs       Dates: Start: 04/23/23         Goal: LTG-By discharge, patient will safely swallow       Dates: Start: 04/23/23       Description: 1) Individualized goal:  the least restrictive diet texture and thin liquids with no overt clinical s/sx of aspiration implementing safe swallow strategies with 90% accuracy and MOD I for a safe D/C home w/ family.   2) Interventions:  SLP Swallowing Dysfunction Treatment, SLP Video Swallow Evaluation, SLP Self Care / ADL Training , and SLP Group Treatment             Goal: LTG-By discharge, patient will solve complex problem       Dates: Start: 04/23/23       Description: 1) Individualized goal:  related to health and safety with 80% accuracy and MOD I for a safe D/C home with family.   2) Interventions:  SLP Self Care / ADL Training , SLP Cognitive Skill Development, and SLP Group Treatment                Problem: Swallowing STGs       Dates: Start: 04/23/23

## 2023-04-26 NOTE — THERAPY
"Physical Therapy   Daily Treatment     Patient Name: Farhan Tanner  Age:  88 y.o., Sex:  female  Medical Record #: [id number]  Today's Date: 4/26/2023     Precautions  Precautions: Fall Risk  Comments: L neglect, L deepika, aspiration risk    Subjective    \"My L hand isn't reliable\"     Objective       04/26/23 1401   Gait Functional Level of Assist    Gait Level Of Assist Minimal Assist  (occassional LOB)   Assistive Device Single Point Cane;None   Distance (Feet) 200  (ambulatory throughout session at various distances)   # of Times Distance was Traveled 1   Deviation Ataxic;Decreased Base Of Support;Bradykinetic  (path deviation to L)   Stairs Functional Level of Assist   Level of Assist with Stairs Minimal Assist  (L hand management and near LOB)   # of Stairs Climbed 10  (6 at 4\", 4 at 6\")   Stairs Description Hand rails;Supervision for safety;Verbal cueing  (step over step)   Bed Mobility    Sit to Stand Standby Assist   Neuro-Muscular Treatments   Neuro-Muscular Treatments Facilitation;Verbal Cuing;Tactile Cuing;Inhibition   Interdisciplinary Plan of Care Collaboration   IDT Collaboration with  Family / Caregiver   Patient Position at End of Therapy Seated;Chair Alarm On;Self Releasing Lap Belt Applied  (at nursing station)   Collaboration Comments CVA education     NMRE  Balloon volley using only LUE x 8 min w/ CGA and intermittent unilateral UE support.  Forced use, dual task. Ambulation while maintaining ball on a tennis racket w/ BUE use, L hand bound to handle, SBA. 4 trials    Assessment    Pt's mobility continues to be limited by L sided neglect; she requires frequent cuing to attend to the L. Attempted using SPC to increase stability and reduce assistance needed for preventing LOB w/ minimal success.   Strengths: Able to follow instructions, Effective communication skills, Independent prior level of function, Pleasant and cooperative, Willingly participates in therapeutic activities  Barriers: " Confused, Decreased endurance, Fatigue, Hemiparesis, Home accessibility, Impaired activity tolerance, Impaired balance, Impaired carryover of learning, Impaired insight/denial of deficits, Impaired functional cognition, Impulsive, Limited mobility, Visual impairment    Plan    Progress gait training using FWW, BLE strength training, static/dynamic balance training, stair training, transfer training, L side attention training, incorporate LUE, dual task activities     Passport items to be completed:  Get in/out of bed safely, in/out of a vehicle, safely use mobility device, walk or wheel around home/community, navigate up and down stairs, show how to get up/down from the ground, ensure home is accessible, demonstrate HEP, complete caregiver training      Physical Therapy Problems (Active)       Problem: Mobility       Dates: Start: 04/23/23         Goal: STG-Within one week, patient will ambulate household distance 150 ft with no AD vs LRAD and CGA.       Dates: Start: 04/23/23            Goal: STG-Within one week, patient will ascend and descend 2-3 stairs with CGA and BHR prn.       Dates: Start: 04/23/23               Problem: PT-Long Term Goals       Dates: Start: 04/23/23         Goal: LTG-By discharge, patient will ambulate 200 ft x3 indoors/ outdoors with no AD vs LRAD and SPV.       Dates: Start: 04/23/23            Goal: LTG-By discharge, patient will transfer one surface to another with no AD vs LRAD and SPV.       Dates: Start: 04/23/23            Goal: LTG-By discharge, patient will ambulate up/down 2-3 stairs with SPV, no AD vs LRAD, and no HR to simulate home entrance.       Dates: Start: 04/23/23            Goal: LTG-By discharge, patient will transfer in/out of a car with no AD vs LRAD and SPV after set up.       Dates: Start: 04/23/23

## 2023-04-26 NOTE — DISCHARGE PLANNING
Case Management/IDT follow up.   IDT continues to recommend IRF level of care as patient continue to make progress with all therapies.   Projected dc date set for: 05/05/2023      DC needs: DME: TBD  Recommendations made for home health for PT/OT/SLP  Follow up with: PCP, [facility],      Plan:  Continue to follow

## 2023-04-26 NOTE — PROGRESS NOTES
"Rehab Progress Note     Date of Service: 4/26/2023  Chief Complaint: Follow-up hemorrhagic stroke    Interval Events (Subjective)    Patient seen and evaluated today in the therapy gym.  She is working with occupational therapy on range of motion of her arms.  She currently is denying any pain.  She continues to have difficulty paying attention to things on the left per therapy staff.  We will have her first weekly conference this afternoon to discuss a discharge date.    Patient has no other new questions, concerns, or complaints today.       Objective:  VITAL SIGNS: /73   Pulse 73   Temp 36.5 °C (97.7 °F) (Oral)   Resp 18   Ht 1.6 m (5' 3\")   Wt 63 kg (138 lb 14.2 oz)   SpO2 93%   BMI 24.60 kg/m²   Gen: alert, no apparent distress  CV: Regular rate, regular rhythm  Resp: Clear to auscultation bilaterally  Neuro: Left neglect, nonfocal    Recent Results (from the past 72 hour(s))   TSH WITH REFLEX TO FT4    Collection Time: 04/25/23  5:57 AM   Result Value Ref Range    TSH 5.700 (H) 0.380 - 5.330 uIU/mL   FREE THYROXINE    Collection Time: 04/25/23  5:57 AM   Result Value Ref Range    Free T-4 0.95 0.93 - 1.70 ng/dL       Scheduled Medications   Medication Dose Frequency    omeprazole  20 mg DAILY    senna-docusate  2 Tablet BID       Current Diet Order   Procedures    Diet Order Diet: Level 6 - Soft and Bite Sized (meds whole 1:1 floated, no straws); Liquid level: Level 0 - Thin       Assessment:    This patient is a 88 y.o. female admitted for acute inpatient rehabilitation with Intraparenchymal hemorrhage of brain (HCC).    I led and attended the weekly conference today, and agree with the IDT conference documentation and plan of care as noted below.    Date of conference: 4/26/2023    Goals and barriers: See IDT note.    Biggest barriers: continent/incontinent of bowel, impulsive, dysphagia, left neglect, cognitive impairment    CM/social support: family supportive    Anticipated DC date: " 5/5    Home health: PT/OT/SLP/RN    Equip: TBD    Follow up: PCP, [facility],       Problem List/Medical Decision Making and Plan:    Right frontal hemorrhagic stroke  (History of left frontal hemorrhagic stroke in 2021 and 2022)  Etiology secondary to amyloid angiopathy  Left neglect  Left visual field cut  Functional left hemiparesis  Dysphagia  Nondominant  Moderate cognitive impairment  Continue full rehab program  PT/OT/SLP, 1 hr each discipline, 5 days per week    Outpatient follow-up with [facility] and , referrals made    Hyperlipidemia    No evidence for older patients that statins prevent heart disease, strokes, or death    Encephalopathy  Continue enclosure bed for safety at night    I certify that the patient currently requires non-pharmacologic restraints. Non-pharmacologic restraints are required to reduce the potential for the patient to harm themselves or others, to limit violent behaviors, and to allow proper assessment and care. I have completed a face to face assessment of this patient on 4/26/2023. Alternative methods including but not limited to de-escalation techniques, redirection, and pharmacologic treatment have been attempted but patient currently remains at risk without restraints. Our staff has been trained on restraints and patient is currently placed in video monitored room.  The need for these restraints is assessed by a rehabilitation physician every 24 hours and use of restraints is minimized when appropriate.  Current diagnosis which requires restraints: Encephalopathy. Current restraints required: Enclosure bed.       GI prophylaxis  Continue omeprazole    Abnormal thyroid studies  TSH mildly elevated at 5.7, normal T4  Outpatient follow up with PCP for repeat studies in 4-6 weeks    Bowels  Bowel incontinence  Continue Senokot  Last bowel movement 4/25    Bladder  With mild retention, continues  Continue to check PVRs - elevated  IC for  over 400 CC - not requiring  Monitor need to start tamsulosin     DVT prophylaxis  Contraindicated given hemorrhage.   Compression stockings on in am, off in pm.  Increase mobility. Monitor for swelling.  Patient currently walking 250 feet.      Christine Ji M.D.  Physical Medicine and Rehabilitation

## 2023-04-26 NOTE — CARE PLAN
Problem: Memory STGs  Goal: STG-Within one week, patient will  Description: 1) Individualized goal:  recall novel information r/t hospitalization with external memory aids with 80% accuracy and MIN A.   2) Interventions:  SLP Self Care / ADL Training , SLP Cognitive Skill Development, and SLP Group Treatment      Outcome: Not Met  Note: Will continue to target.      Problem: Swallowing STGs  Goal: STG-Within one week, patient will safely swallow  Description: 1) Individualized goal:  pureed textures and mildly thickened liquids w/ no overt clinical s/sx of aspiration in 3/3 meals.   2) Interventions:  SLP Swallowing Dysfunction Treatment, SLP Self Care / ADL Training , and SLP Group Treatment      Outcome: Met  Goal: STG-Within one week, patient will  Description: 1) Individualized goal:  participate in an instrumental swallow study with goals/diet changes as appropriate.  2) Interventions:  SLP Swallowing Dysfunction Treatment, SLP Video Swallow Evaluation, and SLP Self Care / ADL Training       Outcome: Met     Problem: Social Interaction STGs  Goal: STG-Within one week, patient will  Outcome: Met  Note: Goal created in error.      Problem: Problem Solving STGs  Goal: STG-Within one week, patient will  Description: 1) Individualized goal:  complete administration of the SCCAN with further goals developed as appropriate.  2) Interventions:  SLP Self Care / ADL Training , SLP Cognitive Skill Development, and SLP Group Treatment        Outcome: Met  Note: Moderate overall deficits.

## 2023-04-26 NOTE — CARE PLAN
"The patient is Watcher - Medium risk of patient condition declining or worsening    Shift Goals  Clinical Goals: Safety  Patient Goals: Safety  Family Goals: support    Progress made toward(s) clinical / shift goals:    Problem: Fall Risk - Rehab  Goal: Patient will remain free from falls  Note: Siria Concepcion Fall risk Assessment Score: 27    High fall risk Interventions   - Alarming seatbelt  - Bed and strip alarm   - Yellow sign by the door   - Yellow wrist band \"Fall risk\"  - Room near to the nurse station  - Do not leave patient unattended in the bathroom  - Fall risk education provided  - SOMA BED at night.          Problem: Pain - Standard  Goal: Alleviation of pain or a reduction in pain to the patient’s comfort goal  Note: Patient able to verbalize needs.  Denies pain or discomfort this shift and no s/s same noted.  Will continue to monitor.            " Patient currently asking for q- tips - when asked what they are for he states they are for his nose and gave patient tissues for his nose. Q-tips were at bedside upon entering room at which point he was dipping them in water then placing them in his ear. I educated patient on importance of not using Qtips for ears

## 2023-04-26 NOTE — CARE PLAN
Problem: Mobility  Goal: STG-Within one week, patient will ambulate household distance 150 ft with no AD vs LRAD and CGA.  Outcome: Progressing  Pt requires Noemy w/o AD  Goal: STG-Within one week, patient will ascend and descend 2-3 stairs with CGA and BHR prn.  Outcome: Progressing   Pt hasn't attempted consecutive steps  Problem: Mobility  Goal: STG-Within one week, patient will ambulate up/down a curb with no AD vs LRAD and CGA to facilitate return to community ambulation.  Outcome: Met     Problem: Mobility Transfers  Goal: STG-Within one week, patient will transfer bed to chair with no AD vs LRAD and SBA.  Outcome: Met

## 2023-04-26 NOTE — CARE PLAN
"The patient is Watcher - Medium risk of patient condition declining or worsening    Shift Goals  Clinical Goals: safety    Problem: Pain - Standard  Goal: Alleviation of pain or a reduction in pain to the patient’s comfort goal  Note: Patient has no complaints of pain, will continue to monitor.      Problem: Fall Risk - Rehab  Goal: Patient will remain free from falls  Note: Siria Concepcion Fall risk Assessment Score: 24    High fall risk Interventions   - Alarming seatbelt  - Bed and strip alarm   - Yellow sign by the door   - Yellow wrist band \"Fall risk\"  - Room near to the nurse station  - Do not leave patient unattended in the bathroom  - Fall risk education provided     "

## 2023-04-26 NOTE — CARE PLAN
Problem: Grooming  Goal: STG-Within one week, patient will complete grooming with CGA for standing at sink using AE/DME as needed.  Outcome: Not Met     Problem: Dressing  Goal: STG-Within one week, patient will dress LB with mod A overall using AE/DME as needed.  Outcome: Not Met     Problem: Toileting  Goal: STG-Within one week, patient will complete toileting tasks with mod A overall using AE/DME as needed.  Outcome: Not Met     Problem: Bathing  Goal: STG-Within one week, patient will bathe with mod A overall using AE/DME as needed.  Outcome: Met     Problem: Dressing  Goal: STG-Within one week, patient will dress UB with mod A overall using AE/DME as needed.  Outcome: Met

## 2023-04-27 ENCOUNTER — APPOINTMENT (OUTPATIENT)
Dept: SPEECH THERAPY | Facility: REHABILITATION | Age: 88
DRG: 056 | End: 2023-04-27
Attending: PHYSICAL MEDICINE & REHABILITATION
Payer: MEDICARE

## 2023-04-27 ENCOUNTER — APPOINTMENT (OUTPATIENT)
Dept: PHYSICAL THERAPY | Facility: REHABILITATION | Age: 88
DRG: 056 | End: 2023-04-27
Attending: PHYSICAL MEDICINE & REHABILITATION
Payer: MEDICARE

## 2023-04-27 ENCOUNTER — APPOINTMENT (OUTPATIENT)
Dept: OCCUPATIONAL THERAPY | Facility: REHABILITATION | Age: 88
DRG: 056 | End: 2023-04-27
Attending: PHYSICAL MEDICINE & REHABILITATION
Payer: MEDICARE

## 2023-04-27 PROCEDURE — 97112 NEUROMUSCULAR REEDUCATION: CPT

## 2023-04-27 PROCEDURE — 97116 GAIT TRAINING THERAPY: CPT

## 2023-04-27 PROCEDURE — A9270 NON-COVERED ITEM OR SERVICE: HCPCS | Performed by: PHYSICAL MEDICINE & REHABILITATION

## 2023-04-27 PROCEDURE — 97530 THERAPEUTIC ACTIVITIES: CPT

## 2023-04-27 PROCEDURE — 700102 HCHG RX REV CODE 250 W/ 637 OVERRIDE(OP): Performed by: PHYSICAL MEDICINE & REHABILITATION

## 2023-04-27 PROCEDURE — 770010 HCHG ROOM/CARE - REHAB SEMI PRIVAT*

## 2023-04-27 PROCEDURE — 97130 THER IVNTJ EA ADDL 15 MIN: CPT

## 2023-04-27 PROCEDURE — 99232 SBSQ HOSP IP/OBS MODERATE 35: CPT | Performed by: PHYSICAL MEDICINE & REHABILITATION

## 2023-04-27 PROCEDURE — 97129 THER IVNTJ 1ST 15 MIN: CPT

## 2023-04-27 PROCEDURE — 92526 ORAL FUNCTION THERAPY: CPT

## 2023-04-27 RX ORDER — HYDROXYZINE HYDROCHLORIDE 25 MG/1
25 TABLET, FILM COATED ORAL 3 TIMES DAILY PRN
Status: DISCONTINUED | OUTPATIENT
Start: 2023-04-27 | End: 2023-05-04 | Stop reason: HOSPADM

## 2023-04-27 RX ADMIN — SENNOSIDES AND DOCUSATE SODIUM 2 TABLET: 50; 8.6 TABLET ORAL at 09:24

## 2023-04-27 RX ADMIN — OMEPRAZOLE 20 MG: 20 CAPSULE, DELAYED RELEASE ORAL at 09:24

## 2023-04-27 ASSESSMENT — GAIT ASSESSMENTS
ASSISTIVE DEVICE: SINGLE POINT CANE
DEVIATION: DECREASED BASE OF SUPPORT;DECREASED TOE OFF
DISTANCE (FEET): 250
GAIT LEVEL OF ASSIST: MINIMAL ASSIST

## 2023-04-27 ASSESSMENT — PATIENT HEALTH QUESTIONNAIRE - PHQ9
1. LITTLE INTEREST OR PLEASURE IN DOING THINGS: NOT AT ALL
2. FEELING DOWN, DEPRESSED, IRRITABLE, OR HOPELESS: NOT AT ALL
SUM OF ALL RESPONSES TO PHQ9 QUESTIONS 1 AND 2: 0
2. FEELING DOWN, DEPRESSED, IRRITABLE, OR HOPELESS: NOT AT ALL
SUM OF ALL RESPONSES TO PHQ9 QUESTIONS 1 AND 2: 0
1. LITTLE INTEREST OR PLEASURE IN DOING THINGS: NOT AT ALL

## 2023-04-27 ASSESSMENT — ACTIVITIES OF DAILY LIVING (ADL): BED_CHAIR_WHEELCHAIR_TRANSFER_DESCRIPTION: SUPERVISION FOR SAFETY;VERBAL CUEING

## 2023-04-27 NOTE — THERAPY
"Speech Language Pathology  Daily Treatment     Patient Name: Farhan Tanner  Age:  88 y.o., Sex:  female  Medical Record #: [id number]  Today's Date: 4/27/2023     Precautions  Precautions: Fall Risk  Comments: L neglect, L deepika, aspiration risk    Subjective    Pt was met in the hallway at the nurses station and was willing to participate in ST, pt was pleasant and cooperative throughout this session.      Objective       04/27/23 1431   Treatment Charges   SLP Cognitive Skill Development First 15 Minutes 1   SLP Cognitive Skill Development Additional 15 Minutes 1   SLP Total Time Spent   SLP Individual Total Time Spent (Mins) 30         Assessment    Pt participated in ST session targeting left neglect and visual scanning.  Pt was able to locate single targets during this scanning task independently with only one missed target on all 3 tasks (all missed targets were located on the top line on the left).  When difficulty increased to 2 targets pt's accuracy decreased to approximately 70% with pt jumping between lines to locate targets (vs starting on the left and scanning systematically one line at a time).  Pt benefited from min-mod cues to start on the left and slowly scan one line at a time to increase her accuracy to 100%.  When pt was asked about her new visual deficits pt denied any changes.  When asked about difficulty locating objects on her left side pt stated \"That's what my therapists tell me\".      Strengths: Pleasant and cooperative, Supportive family, Willingly participates in therapeutic activities, Motivated for self care and independence, Independent prior level of function  Barriers: Impaired functional cognition, Impaired insight/denial of deficits, Impaired carryover of learning    Plan    Continue targeting left neglect, visual scanning, dysphagia management       Speech Therapy Problems (Active)       Problem: Memory STGs       Dates: Start: 04/23/23         Goal: STG-Within one week, " patient will       Dates: Start: 04/23/23       Description: 1) Individualized goal:  recall novel information r/t hospitalization with external memory aids with 80% accuracy and MIN A.   2) Interventions:  SLP Self Care / ADL Training , SLP Cognitive Skill Development, and SLP Group Treatment          Goal Note filed on 04/25/23 1705 by Gayla Dill MS,CCC-SLP       Will continue to target.                  Problem: Problem Solving STGs       Dates: Start: 04/25/23         Goal: STG-Within one week, patient will complete visual scanning/cancellation tasks with adherence to left margin/visual field to achieve 80% accuracy provided MOD A       Dates: Start: 04/25/23            Goal: STG-Within one week, patient will perform single target visual scanning tasks with 100% with min-set up cuing and use of compensatory strategies.       Dates: Start: 04/26/23               Problem: Speech/Swallowing LTGs       Dates: Start: 04/23/23         Goal: LTG-By discharge, patient will safely swallow       Dates: Start: 04/23/23       Description: 1) Individualized goal:  the least restrictive diet texture and thin liquids with no overt clinical s/sx of aspiration implementing safe swallow strategies with 90% accuracy and MOD I for a safe D/C home w/ family.   2) Interventions:  SLP Swallowing Dysfunction Treatment, SLP Video Swallow Evaluation, SLP Self Care / ADL Training , and SLP Group Treatment             Goal: LTG-By discharge, patient will solve complex problem       Dates: Start: 04/23/23       Description: 1) Individualized goal:  related to health and safety with 80% accuracy and MOD I for a safe D/C home with family.   2) Interventions:  SLP Self Care / ADL Training , SLP Cognitive Skill Development, and SLP Group Treatment                Problem: Swallowing STGs       Dates: Start: 04/23/23

## 2023-04-27 NOTE — THERAPY
Occupational Therapy  Daily Treatment     Patient Name: Farhan Tanner  Age:  88 y.o., Sex:  female  Medical Record #: [id number]  Today's Date: 4/27/2023     Precautions  Precautions: Fall Risk  Comments: L neglect, L deepika, aspiration risk    Safety   Comments: See functional levels of assist for ADL performance details    Subjective    Patient seated in [facility] w/ son upon arrival, agreeable to participate in OT.      Objective     04/27/23 1301   OT Charge Group   OT Neuromuscular Re-education / Balance (Units) 2   OT Therapy Activity (Units) 2   OT Total Time Spent   OT Individual Total Time Spent (Mins) 60   Precautions   Precautions Fall Risk   Comments L neglect, L deepika, aspiration risk   Cognition    Level of Consciousness Alert   Sleep/Wake Cycle   Sleep & Rest Awake   Neuro-Muscular Treatments   Neuro-Muscular Treatments Facilitation;Postural Changes;Tactile Cuing;Verbal Cuing;Weight Shift Right;Weight Shift Left   Comments See note below re: LUE neuro re-ed information   Interdisciplinary Plan of Care Collaboration   IDT Collaboration with  Nursing;Family / Caregiver   Patient Position at End of Therapy Seated;Self Releasing Lap Belt Applied   Collaboration Comments Rec'd patient from son in [facility], transferred care to nursing @ end of session (seated @ nursing station)     Clear arm tray provided w/ bright orange tape along L edge to serve as anchor    LUE neuro re-ed:  -While seated EOM, patient crossed midline to retrieve rings and place onto vertical dowels on contralateral side, increased time required due to impaired LUE strength, sensation, coordination and attention   -WB onto LUE (elbow <> midline)    Assessment    Patient tolerated OT session fair with focus on LUE neuro re-ed. Patient presents with good proximal LUE strength however distal LUE strength is limited; patient has difficulty incorporating into function due to poor LUE coordination, sensory deficits and poor attention.   Strengths:  Independent prior level of function, Making steady progress towards goals, Manages pain appropriately, Motivated for self care and independence, Pleasant and cooperative, Willingly participates in therapeutic activities  Barriers: Confused, Decreased endurance, Difficulty following instructions, Fatigue, Generalized weakness, Hemiplegia, Impaired activity tolerance, Impaired balance, Impulsive, Visual impairment    Plan    Trial bath mitt during next shower to assess whether improves functional independence w/ bathing tasks.      ADLs, LUE functional use/strengthening    Occupational Therapy Goals (Active)       Problem: Dressing       Dates: Start: 04/23/23         Goal: STG-Within one week, patient will dress LB with mod A overall using AE/DME as needed.       Dates: Start: 04/23/23               Problem: Grooming       Dates: Start: 04/23/23         Goal: STG-Within one week, patient will complete grooming with CGA for standing at sink using AE/DME as needed.       Dates: Start: 04/23/23               Problem: OT Long Term Goals       Dates: Start: 04/23/23         Goal: LTG-By discharge, patient will complete basic self care tasks with supervision-mod I overall using AE/DME as needed.       Dates: Start: 04/23/23            Goal: LTG-By discharge, patient will perform bathroom transfers with supervision-mod I overall using AE/DME as needed.       Dates: Start: 04/23/23               Problem: Toileting       Dates: Start: 04/23/23         Goal: STG-Within one week, patient will complete toileting tasks with mod A overall using AE/DME as needed.       Dates: Start: 04/23/23

## 2023-04-27 NOTE — CARE PLAN
Problem: Bladder / Voiding  Goal: Patient will establish and maintain regular urinary output  Outcome: Progressing  Patient timed voided to prevent incontinence.   The patient is Stable - Low risk of patient condition declining or worsening    Shift Goals  Clinical Goals: safety  Patient Goals: Safety  Family Goals: support    Progress made toward(s) clinical / shift goals:    Problem: Fall Risk - Rehab  Goal: Patient will remain free from falls  Outcome: Progressing  Patient uses soma bed to ensure safety/prevent falls.       Patient is not progressing towards the following goals:

## 2023-04-27 NOTE — THERAPY
"Physical Therapy   Daily Treatment     Patient Name: Farhan Tanner  Age:  88 y.o., Sex:  female  Medical Record #: [id number]  Today's Date: 4/27/2023     Precautions  Precautions: Fall Risk  Comments: L neglect, L deepika, aspiration risk    Subjective    \"It's kind of numb\"  \"Where is it?\" Referring to L UE when overhead during supine PNF coordination training     Objective       04/27/23 1030   PT Charge Group   PT Gait Training (Units) 2   PT Neuromuscular Re-Education / Balance (Units) 1   PT Therapeutic Activities (Units) 1   PT Total Time Spent   PT Individual Total Time Spent (Mins) 60   Gait Functional Level of Assist    Gait Level Of Assist Minimal Assist  (overall CGA however occasional Noemy for LOB L)   Assistive Device Single Point Cane   Distance (Feet) 250   # of Times Distance was Traveled 3   Deviation Decreased Base Of Support;Decreased Toe Off  (decr L LE foot clearance when fatigued or distracted, path deviation L, VC's for to scan L)   Transfer Functional Level of Assist   Bed, Chair, Wheelchair Transfer Contact Guard Assist   Bed Chair Wheelchair Transfer Description Supervision for safety;Verbal cueing  (stand step no AD)   Bed Mobility    Sit to Stand Standby Assist   Neuro-Muscular Treatments   Neuro-Muscular Treatments Co-Contraction;Postural Facilitation;Sequencing;Tactile Cuing;Verbal Cuing   Comments see note   Interdisciplinary Plan of Care Collaboration   IDT Collaboration with  Occupational Therapist   Patient Position at End of Therapy Seated;Chair Alarm On;Self Releasing Lap Belt Applied  (handoff to t dine)   Collaboration Comments L UE impaired coordination, problem-solving L UE support for WC     Trialed FWW however pt unable to maintain L UE grasp without max verbal cueing    Completed gait training SPC indoors and outdoors on uneven surfaces with min VC's for sequencing and mod VC's to scan environment and attend to L LE    Completed floor recovery with CGA after " demonstration    Supine PNF NRE:  L UE ext/add rhythmic initiation into dynamic reversals  L UE flex/add followed by ext/abd however pt unable to extend L wrist  L LE flex/add with knee flexion rhythmic initiation into dynamic reversals  L LE SL bridging with resistance at pelvis  Standing balance reaching with L UE crossing midline    Assessment    Pt continues to be limited by L neglect and is at significant risk for falls due to this. She demonstrated improved sequencing and stability during ambulation with SPC today, though she continues to require frequent Vcs to scan L and occasional Noemy for LOB. Pt does not seem to understand the purpose of therapy with forced use of L hemibody despite frequent education.  Strengths: Able to follow instructions, Effective communication skills, Independent prior level of function, Pleasant and cooperative, Willingly participates in therapeutic activities  Barriers: Confused, Decreased endurance, Fatigue, Hemiparesis, Home accessibility, Impaired activity tolerance, Impaired balance, Impaired carryover of learning, Impaired insight/denial of deficits, Impaired functional cognition, Impulsive, Limited mobility, Visual impairment    Plan    Gait training SPC scanning L and path-finding, L hemibody NRE, dual task, dynamic standing balance with forced use L UE and crossing midline    Passport items to be completed:  Get in/out of bed safely, in/out of a vehicle, safely use mobility device, walk or wheel around home/community, navigate up and down stairs, show how to get up/down from the ground, ensure home is accessible, demonstrate HEP, complete caregiver training    Physical Therapy Problems (Active)       Problem: Mobility       Dates: Start: 04/23/23         Goal: STG-Within one week, patient will ambulate household distance 150 ft with no AD vs LRAD and CGA.       Dates: Start: 04/23/23            Goal: STG-Within one week, patient will ascend and descend 2-3 stairs with CGA  and BHR prn.       Dates: Start: 04/23/23               Problem: PT-Long Term Goals       Dates: Start: 04/23/23         Goal: LTG-By discharge, patient will ambulate 200 ft x3 indoors/ outdoors with no AD vs LRAD and SPV.       Dates: Start: 04/23/23            Goal: LTG-By discharge, patient will transfer one surface to another with no AD vs LRAD and SPV.       Dates: Start: 04/23/23            Goal: LTG-By discharge, patient will ambulate up/down 2-3 stairs with SPV, no AD vs LRAD, and no HR to simulate home entrance.       Dates: Start: 04/23/23            Goal: LTG-By discharge, patient will transfer in/out of a car with no AD vs LRAD and SPV after set up.       Dates: Start: 04/23/23

## 2023-04-27 NOTE — CARE PLAN
"The patient is Watcher - Medium risk of patient condition declining or worsening    Shift Goals  Clinical Goals: safety  Patient Goals: Safety  Family Goals: support    Progress made toward(s) clinical / shift goals:    Problem: Fall Risk - Rehab  Goal: Patient will remain free from falls  Note: Siria Concepcion Fall risk Assessment Score: 24    High fall risk Interventions   - Alarming seatbelt  - Bed and strip alarm   - Yellow sign by the door   - Yellow wrist band \"Fall risk\"  - Room near to the nurse station  - Do not leave patient unattended in the bathroom  - Fall risk education provided       Problem: Pain - Standard  Goal: Alleviation of pain or a reduction in pain to the patient’s comfort goal  Outcome: Progressing           "

## 2023-04-27 NOTE — PROGRESS NOTES
"Rehab Progress Note     Date of Service: 4/27/2023  Chief Complaint: Follow-up hemorrhagic stroke    Interval Events (Subjective)    Patient seen and examined today in her wheelchair at the nursing station.  She denies any pain.  She last moved her bowels yesterday.    Patient has no other new questions, concerns, or complaints today.         Objective:  VITAL SIGNS: /55   Pulse 91   Temp 36.8 °C (98.2 °F) (Oral)   Resp 18   Ht 1.6 m (5' 3\")   Wt 63 kg (138 lb 14.2 oz)   SpO2 96%   BMI 24.60 kg/m²   Gen: alert, no apparent distress  CV: Regular rate, regular rhythm  Resp: Clear to auscultation bilaterally  Neuro: Left neglect, mild left facial weakness, otherwise nonfocal    Recent Results (from the past 72 hour(s))   TSH WITH REFLEX TO FT4    Collection Time: 04/25/23  5:57 AM   Result Value Ref Range    TSH 5.700 (H) 0.380 - 5.330 uIU/mL   FREE THYROXINE    Collection Time: 04/25/23  5:57 AM   Result Value Ref Range    Free T-4 0.95 0.93 - 1.70 ng/dL       Scheduled Medications   Medication Dose Frequency    omeprazole  20 mg DAILY    senna-docusate  2 Tablet BID       Current Diet Order   Procedures    Diet Order Diet: Level 6 - Soft and Bite Sized (meds whole 1:1 floated, no straws); Liquid level: Level 0 - Thin       Assessment:    This patient is a 88 y.o. female admitted for acute inpatient rehabilitation with Intraparenchymal hemorrhage of brain (HCC).    I led and attended the weekly conference, and agree with the IDT conference documentation and plan of care as noted below.    Date of conference: 4/26/2023    Goals and barriers: See IDT note.    Biggest barriers: continent/incontinent of bowel, impulsive, dysphagia, left neglect, cognitive impairment    CM/social support: family supportive    Anticipated DC date: 5/5    Home health: PT/OT/SLP/RN    Equip: TBD    Follow up: PCP, stroke bridge clinic,       Problem List/Medical Decision Making and Plan:    Right frontal hemorrhagic " stroke  (History of left frontal hemorrhagic stroke in 2021 and 2022)  Etiology secondary to amyloid angiopathy  Left neglect  Left visual field cut  Functional left hemiparesis  Dysphagia  Nondominant  Moderate cognitive impairment  Continue full rehab program  PT/OT/SLP, 1 hr each discipline, 5 days per week    Outpatient follow-up with stroke Bridge clinic and , referrals made    Hyperlipidemia    No evidence for older patients that statins prevent heart disease, strokes, or death    Encephalopathy  Continue enclosure bed for safety at night  Continue as needed hydroxyzine for anxiety/agitation    I certify that the patient currently requires non-pharmacologic restraints. Non-pharmacologic restraints are required to reduce the potential for the patient to harm themselves or others, to limit violent behaviors, and to allow proper assessment and care. I have completed a face to face assessment of this patient on 4/27/2023. Alternative methods including but not limited to de-escalation techniques, redirection, and pharmacologic treatment have been attempted but patient currently remains at risk without restraints. Our staff has been trained on restraints and patient is currently placed in video monitored room.  The need for these restraints is assessed by a rehabilitation physician every 24 hours and use of restraints is minimized when appropriate.  Current diagnosis which requires restraints: Encephalopathy. Current restraints required: Enclosure bed.     GI prophylaxis  Continue omeprazole    Abnormal thyroid studies  TSH mildly elevated at 5.7, normal T4  Outpatient follow up with PCP for repeat studies in 4-6 weeks    Bowels  Bowel incontinence  Continue Senokot  Last bowel movement 4/26    Bladder  With mild retention, continues  Continue to check PVRs - elevated, 175, 164  IC for over 400 CC - not requiring  Monitor need to start tamsulosin     DVT prophylaxis  Contraindicated given hemorrhage.    Compression stockings on in am, off in pm.  Increase mobility. Monitor for swelling.  Patient currently walking 250 feet.      Christine Ji M.D.  Physical Medicine and Rehabilitation

## 2023-04-27 NOTE — THERAPY
Speech Language Pathology  Daily Treatment     Patient Name: Farhan Tanner  Age:  88 y.o., Sex:  female  Medical Record #: [id number]  Today's Date: 4/27/2023     Precautions  Precautions: Fall Risk  Comments: L neglect, L deepika, aspiration risk    Subjective    Pt seen during functional meal setting in therapeutic dining room. Pleasant and appeared in good spirits.      Objective       04/27/23 0804   Treatment Charges   SLP Swallowing Dysfunction Treatment Swallowing Dysfunction Treatment   SLP Total Time Spent   SLP Individual Total Time Spent (Mins) 30   Dysphagia    Positioning / Behavior Modification Self Monitoring;Alternate Solids and Liquids;Multiple Swallows   Other Treatments trials of regular textures   Diet / Liquid Recommendation Thin (0);Soft & Bite-Sized (6) - (Dysphagia III)   Nutritional Liquid Intake Rating Scale Non thickened beverages   Nutritional Food Intake Rating Scale Total oral diet with multiple consistencies without special preparation but with specific food limitations   Interdisciplinary Plan of Care Collaboration   IDT Collaboration with  Therapy Tech   Patient Position at End of Therapy Seated;Self Releasing Lap Belt Applied;Chair Alarm On   Collaboration Comments transfer of care to therapy tech         Assessment    Pt assessed with 6- Soft & Bite Sized and regular textures (toast). Pt unable to open containers despite cues to incorporate LUE. Pt with limited awareness of left cheek and pocketing with eggs, attempting to take large bites prior to fully clearing initial bolus. Cues to monitor in mirror and complete finger sweep every 2-3 bites to clear remaining residue. As meal progressed pt independently using finger sweep and cueing faded to monitor with use of mirror. No overt s/sx of pen/asp demonstrating, however, pt will benefit from ongoing training to ensure carryover of strategies and ability to self monitor on left. Cues to scan tray to locate items on left provided at  beginning of session and needed throughout.     Strengths: Pleasant and cooperative, Supportive family, Willingly participates in therapeutic activities, Motivated for self care and independence, Independent prior level of function  Barriers: Impaired functional cognition, Impaired insight/denial of deficits, Impaired carryover of learning    Plan    Continue to target functional scanning, trials of regular textures    Passport items to be completed:  Express basic needs, understand food/liquid recommendations, consistently follow swallow precautions, manage finances, manage medications, arrive to therapy appointments on time, complete daily memory log entries, solve problems related to safety situations, review education related to hospitalization, complete caregiver training     Speech Therapy Problems (Active)       Problem: Memory STGs       Dates: Start: 04/23/23         Goal: STG-Within one week, patient will       Dates: Start: 04/23/23       Description: 1) Individualized goal:  recall novel information r/t hospitalization with external memory aids with 80% accuracy and MIN A.   2) Interventions:  SLP Self Care / ADL Training , SLP Cognitive Skill Development, and SLP Group Treatment          Goal Note filed on 04/25/23 9336 by Gayla Dill MS,CCC-SLP       Will continue to target.                  Problem: Problem Solving STGs       Dates: Start: 04/25/23         Goal: STG-Within one week, patient will complete visual scanning/cancellation tasks with adherence to left margin/visual field to achieve 80% accuracy provided MOD A       Dates: Start: 04/25/23            Goal: STG-Within one week, patient will perform single target visual scanning tasks with 100% with min-set up cuing and use of compensatory strategies.       Dates: Start: 04/26/23               Problem: Speech/Swallowing LTGs       Dates: Start: 04/23/23         Goal: LTG-By discharge, patient will safely swallow       Dates: Start: 04/23/23        Description: 1) Individualized goal:  the least restrictive diet texture and thin liquids with no overt clinical s/sx of aspiration implementing safe swallow strategies with 90% accuracy and MOD I for a safe D/C home w/ family.   2) Interventions:  SLP Swallowing Dysfunction Treatment, SLP Video Swallow Evaluation, SLP Self Care / ADL Training , and SLP Group Treatment             Goal: LTG-By discharge, patient will solve complex problem       Dates: Start: 04/23/23       Description: 1) Individualized goal:  related to health and safety with 80% accuracy and MOD I for a safe D/C home with family.   2) Interventions:  SLP Self Care / ADL Training , SLP Cognitive Skill Development, and SLP Group Treatment                Problem: Swallowing STGs       Dates: Start: 04/23/23

## 2023-04-28 ENCOUNTER — APPOINTMENT (OUTPATIENT)
Dept: SPEECH THERAPY | Facility: REHABILITATION | Age: 88
DRG: 056 | End: 2023-04-28
Attending: PHYSICAL MEDICINE & REHABILITATION
Payer: MEDICARE

## 2023-04-28 ENCOUNTER — APPOINTMENT (OUTPATIENT)
Dept: OCCUPATIONAL THERAPY | Facility: REHABILITATION | Age: 88
End: 2023-04-28
Attending: PHYSICAL MEDICINE & REHABILITATION
Payer: MEDICARE

## 2023-04-28 ENCOUNTER — APPOINTMENT (OUTPATIENT)
Dept: PHYSICAL THERAPY | Facility: REHABILITATION | Age: 88
DRG: 056 | End: 2023-04-28
Attending: PHYSICAL MEDICINE & REHABILITATION
Payer: MEDICARE

## 2023-04-28 PROCEDURE — 97130 THER IVNTJ EA ADDL 15 MIN: CPT

## 2023-04-28 PROCEDURE — A9270 NON-COVERED ITEM OR SERVICE: HCPCS | Performed by: PHYSICAL MEDICINE & REHABILITATION

## 2023-04-28 PROCEDURE — 700102 HCHG RX REV CODE 250 W/ 637 OVERRIDE(OP): Performed by: PHYSICAL MEDICINE & REHABILITATION

## 2023-04-28 PROCEDURE — 97129 THER IVNTJ 1ST 15 MIN: CPT

## 2023-04-28 PROCEDURE — 92526 ORAL FUNCTION THERAPY: CPT

## 2023-04-28 PROCEDURE — 97116 GAIT TRAINING THERAPY: CPT

## 2023-04-28 PROCEDURE — 770010 HCHG ROOM/CARE - REHAB SEMI PRIVAT*

## 2023-04-28 PROCEDURE — 97530 THERAPEUTIC ACTIVITIES: CPT

## 2023-04-28 PROCEDURE — 97112 NEUROMUSCULAR REEDUCATION: CPT

## 2023-04-28 PROCEDURE — 97535 SELF CARE MNGMENT TRAINING: CPT

## 2023-04-28 PROCEDURE — 99232 SBSQ HOSP IP/OBS MODERATE 35: CPT | Performed by: PHYSICAL MEDICINE & REHABILITATION

## 2023-04-28 RX ADMIN — OMEPRAZOLE 20 MG: 20 CAPSULE, DELAYED RELEASE ORAL at 10:28

## 2023-04-28 RX ADMIN — SENNOSIDES AND DOCUSATE SODIUM 2 TABLET: 50; 8.6 TABLET ORAL at 10:28

## 2023-04-28 ASSESSMENT — GAIT ASSESSMENTS
GAIT LEVEL OF ASSIST: MINIMAL ASSIST
DEVIATION: DECREASED BASE OF SUPPORT
ASSISTIVE DEVICE: SINGLE POINT CANE
DISTANCE (FEET): 200

## 2023-04-28 ASSESSMENT — ACTIVITIES OF DAILY LIVING (ADL): BED_CHAIR_WHEELCHAIR_TRANSFER_DESCRIPTION: SET-UP OF EQUIPMENT;SUPERVISION FOR SAFETY;VERBAL CUEING

## 2023-04-28 ASSESSMENT — PAIN DESCRIPTION - PAIN TYPE
TYPE: ACUTE PAIN

## 2023-04-28 NOTE — THERAPY
"Physical Therapy   Daily Treatment     Patient Name: Farhan Tanner  Age:  88 y.o., Sex:  female  Medical Record #: [id number]  Today's Date: 4/28/2023     Precautions  Precautions: Fall Risk  Comments: L neglect, L deepika, aspiration risk    Subjective    \"Why do I have to sleep in that bed?\" -regarding Hope bed. Educated pt on impulsivity and appropriate use of call light     Objective       04/28/23 1501   PT Charge Group   PT Gait Training (Units) 1   PT Neuromuscular Re-Education / Balance (Units) 2   PT Therapeutic Activities (Units) 1   PT Total Time Spent   PT Individual Total Time Spent (Mins) 60   Gait Functional Level of Assist    Gait Level Of Assist Minimal Assist  (overall CGA with occasional Noemy for LOB L)   Assistive Device Single Point Cane   Distance (Feet) 200   # of Times Distance was Traveled 2   Deviation Decreased Base Of Support  (path deviation L)   Transfer Functional Level of Assist   Bed, Chair, Wheelchair Transfer Contact Guard Assist   Bed Chair Wheelchair Transfer Description Set-up of equipment;Supervision for safety;Verbal cueing   Standing Lower Body Exercises   Step Up Left;2 sets of 10  (1 set forward, 1 set to side B UE support on // bars with 5 inch step)   Step Down 1 set of 10;Right  (5inch step B UE support)   Bed Mobility    Sit to Stand Standby Assist   Neuro-Muscular Treatments   Neuro-Muscular Treatments Postural Facilitation;Weight Shift Right;Tactile Cuing;Verbal Cuing  (mirror feedback)   Comments see note   Interdisciplinary Plan of Care Collaboration   IDT Collaboration with  Family / Caregiver   Patient Position at End of Therapy Seated;Chair Alarm On;Self Releasing Lap Belt Applied  (at RN station)   Collaboration Comments family expressed pt's request to not have male caregivers assist with toileting     Time spent educating pt and family on use of alarming seat belt and enclosure bed    Completed dynamic gait training in room with pt identifying and collecting " dirty laundry for daughter to take home    Completed gait training in halls with pt using visual cues to ambulate in middle of nix    Standing NRE in //bars:  Picking up small-medium objects with L hand and transferring across midline x8 each  Step ups as described above  Sidestepping with B UE support simulating accessing seating [facility] pew 10ftx4  Picking up objects off ground with L UE and CGA-Noemy  Reactive balance to release of resistance to L and posteriorly      Assessment    Pt had improved motivation to participate in therapy today. She is making slow but steady progress in her independence with fxl mobility due to L neglect, delayed balance reactions, and impaired L UE coordination. She has good potential to reach SPV level for transfers and ambulation prior to DC.  Strengths: Able to follow instructions, Effective communication skills, Independent prior level of function, Pleasant and cooperative, Willingly participates in therapeutic activities  Barriers: Confused, Decreased endurance, Fatigue, Hemiparesis, Home accessibility, Impaired activity tolerance, Impaired balance, Impaired carryover of learning, Impaired insight/denial of deficits, Impaired functional cognition, Impulsive, Limited mobility, Visual impairment    Plan    Gait training SPC scanning L and path-finding, L hemibody NRE, dual task, dynamic standing balance with forced use L UE and crossing midline  Monday: reassess Bond     Passport items to be completed:  Get in/out of bed safely, in/out of a vehicle, safely use mobility device, walk or wheel around home/community, navigate up and down stairs, show how to get up/down from the ground, ensure home is accessible, demonstrate HEP, complete caregiver training      Physical Therapy Problems (Active)       Problem: Mobility       Dates: Start: 04/23/23         Goal: STG-Within one week, patient will ambulate household distance 150 ft with no AD vs LRAD and CGA.       Dates: Start: 04/23/23             Goal: STG-Within one week, patient will ascend and descend 2-3 stairs with CGA and BHR prn.       Dates: Start: 04/23/23               Problem: PT-Long Term Goals       Dates: Start: 04/23/23         Goal: LTG-By discharge, patient will ambulate 200 ft x3 indoors/ outdoors with no AD vs LRAD and SPV.       Dates: Start: 04/23/23            Goal: LTG-By discharge, patient will transfer one surface to another with no AD vs LRAD and SPV.       Dates: Start: 04/23/23            Goal: LTG-By discharge, patient will ambulate up/down 2-3 stairs with SPV, no AD vs LRAD, and no HR to simulate home entrance.       Dates: Start: 04/23/23            Goal: LTG-By discharge, patient will transfer in/out of a car with no AD vs LRAD and SPV after set up.       Dates: Start: 04/23/23

## 2023-04-28 NOTE — THERAPY
Occupational Therapy  Daily Treatment     Patient Name: Farhan Tanner  Age:  88 y.o., Sex:  female  Medical Record #: [id number]  Today's Date: 4/28/2023     Precautions  Precautions: Fall Risk  Comments: L neglect, L deepika, aspiration risk    Safety   Comments: See functional levels of assist for ADL performance details    Subjective    Patient seated in w/c @ nurses station upon arrival, agreeable to participate in OT.      Objective     04/28/23 0931   OT Charge Group   OT Self Care / ADL (Units) 4   OT Total Time Spent   OT Individual Total Time Spent (Mins) 60   Cognition    Level of Consciousness Alert   Sleep/Wake Cycle   Sleep & Rest Awake   Functional Level of Assist   Bathing Moderate Assist  (for safety, sequencing, throughness, RUE (due to L deepika))   Upper Body Dressing Maximal Assist  (mod-max A to don button up shirt while seated in w/c; assist required for threading/attention to LUE, functional sequencing and buttoning)   Lower Body Dressing Maximal Assist  (to don scrub bottoms and non skid socks while incorporating sitting and standing)   Tub / Shower Transfers Minimal Assist  (wc <> fold down shower bench, step pivot)   Interdisciplinary Plan of Care Collaboration   Patient Position at End of Therapy Seated;Chair Alarm On;Self Releasing Lap Belt Applied  (@ nurses station)     Max assist required to wash hands while seated in w/c @ sink    Provided patient with new arm L arm tray    Assessment    Patient tolerated OT session fair with focus on progression with ADLs. Benefits from using bath mitt to facilitate LUE attention and volitional use during bathing tasks. Heavy cues required to incorporate LUE into grooming task; limited significantly by significant neglect, decreased sensation, impaired coordination and impaired strength. Patient also requires cues to maximize initiation and functional sequencing.   Strengths: Independent prior level of function, Making steady progress towards goals,  Manages pain appropriately, Motivated for self care and independence, Pleasant and cooperative, Willingly participates in therapeutic activities  Barriers: Confused, Decreased endurance, Difficulty following instructions, Fatigue, Generalized weakness, Hemiplegia, Impaired activity tolerance, Impaired balance, Impulsive, Visual impairment    Plan    Add anchor tape to L arm tray if needed     ADLs, LUE functional use/strengthening/coordination/attention    Occupational Therapy Goals (Active)       Problem: Dressing       Dates: Start: 04/23/23         Goal: STG-Within one week, patient will dress LB with mod A overall using AE/DME as needed.       Dates: Start: 04/23/23               Problem: Grooming       Dates: Start: 04/23/23         Goal: STG-Within one week, patient will complete grooming with CGA for standing at sink using AE/DME as needed.       Dates: Start: 04/23/23               Problem: OT Long Term Goals       Dates: Start: 04/23/23         Goal: LTG-By discharge, patient will complete basic self care tasks with supervision-mod I overall using AE/DME as needed.       Dates: Start: 04/23/23            Goal: LTG-By discharge, patient will perform bathroom transfers with supervision-mod I overall using AE/DME as needed.       Dates: Start: 04/23/23               Problem: Toileting       Dates: Start: 04/23/23         Goal: STG-Within one week, patient will complete toileting tasks with mod A overall using AE/DME as needed.       Dates: Start: 04/23/23

## 2023-04-28 NOTE — THERAPY
Speech Language Pathology  Daily Treatment     Patient Name: Farhan Tanner  Age:  88 y.o., Sex:  female  Medical Record #: [id number]  Today's Date: 4/28/2023     Precautions  Precautions: Fall Risk  Comments: L neglect, L deepika, aspiration risk    Subjective    Pt seen in ST office and therapeutic dining this date. Pleasant and appeared in good spirits. Son did not remain for session.     Objective       04/28/23 1104   Treatment Charges   SLP Swallowing Dysfunction Treatment Swallowing Dysfunction Treatment   SLP Cognitive Skill Development First 15 Minutes 1   SLP Cognitive Skill Development Additional 15 Minutes 1   SLP Total Time Spent   SLP Individual Total Time Spent (Mins) 60   Cognition   Visual Scanning / Cancellation Skills Minimal (4)   Dysphagia    Positioning / Behavior Modification Self Monitoring;Modulate Rate or Bite Size;Multiple Swallows;Alternate Solids and Liquids   Other Treatments trials regular textures   Diet / Liquid Recommendation Thin (0);Regular (7)  (chop meats)   Nutritional Liquid Intake Rating Scale Non thickened beverages   Nutritional Food Intake Rating Scale Total oral diet with no restrictions   Interdisciplinary Plan of Care Collaboration   IDT Collaboration with  Therapy Tech   Patient Position at End of Therapy Seated;Self Releasing Lap Belt Applied;Chair Alarm On   Collaboration Comments transfer of care to therapy tech         Assessment    Pt completing visual scanning/cancellation tasks 1-2 targets x6 trials. Pt on average missed 3 targets, however, significant improvement in scanning to far left margin (left to right, top to bottom). Targets missed initially were not all on left margin, pt was able to locate once told number of omissions. Pt continues to demonstrate functional inattention to LUE, arm frequently falling off tray in ST office, with verbal cues to attend and bring back up into neutral position.  Pt assessed with regular textures/thin liquids. Significant  improvement in self monitoring and clearing pocketing from left lower sulci. No overt s/sx of pen/asp, no anterior spillage noted with food or liquids this session. Recommend advance to regular textures, ongoing supervision in therapeutic dining and cues to attend to left side. Pt dragging left hand through plate on several occassions this session.     Strengths: Pleasant and cooperative, Supportive family, Willingly participates in therapeutic activities, Motivated for self care and independence, Independent prior level of function  Barriers: Impaired functional cognition, Impaired insight/denial of deficits, Impaired carryover of learning    Plan    Continue to target scanning, advance to regular textures    Passport items to be completed:  Express basic needs, understand food/liquid recommendations, consistently follow swallow precautions, manage finances, manage medications, arrive to therapy appointments on time, complete daily memory log entries, solve problems related to safety situations, review education related to hospitalization, complete caregiver training     Speech Therapy Problems (Active)       Problem: Memory STGs       Dates: Start: 04/23/23         Goal: STG-Within one week, patient will       Dates: Start: 04/23/23       Description: 1) Individualized goal:  recall novel information r/t hospitalization with external memory aids with 80% accuracy and MIN A.   2) Interventions:  SLP Self Care / ADL Training , SLP Cognitive Skill Development, and SLP Group Treatment          Goal Note filed on 04/25/23 1705 by Gayla Dill MS,CCC-SLP       Will continue to target.                  Problem: Problem Solving STGs       Dates: Start: 04/25/23         Goal: STG-Within one week, patient will complete visual scanning/cancellation tasks with adherence to left margin/visual field to achieve 80% accuracy provided MOD A       Dates: Start: 04/25/23            Goal: STG-Within one week, patient will perform  single target visual scanning tasks with 100% with min-set up cuing and use of compensatory strategies.       Dates: Start: 04/26/23               Problem: Speech/Swallowing LTGs       Dates: Start: 04/23/23         Goal: LTG-By discharge, patient will safely swallow       Dates: Start: 04/23/23       Description: 1) Individualized goal:  the least restrictive diet texture and thin liquids with no overt clinical s/sx of aspiration implementing safe swallow strategies with 90% accuracy and MOD I for a safe D/C home w/ family.   2) Interventions:  SLP Swallowing Dysfunction Treatment, SLP Video Swallow Evaluation, SLP Self Care / ADL Training , and SLP Group Treatment             Goal: LTG-By discharge, patient will solve complex problem       Dates: Start: 04/23/23       Description: 1) Individualized goal:  related to health and safety with 80% accuracy and MOD I for a safe D/C home with family.   2) Interventions:  SLP Self Care / ADL Training , SLP Cognitive Skill Development, and SLP Group Treatment                Problem: Swallowing STGs       Dates: Start: 04/23/23

## 2023-04-28 NOTE — PROGRESS NOTES
"Rehab Progress Note     Date of Service: 4/28/2023  Chief Complaint: Follow-up hemorrhagic stroke    Interval Events (Subjective)    Patient seen and examined today out in the hallway.  She denies any pain.  She last moved her bowels today.        Objective:  VITAL SIGNS: BP 97/58   Pulse 68   Temp 36.7 °C (98.1 °F) (Oral)   Resp 12   Ht 1.6 m (5' 3\")   Wt 63 kg (138 lb 14.2 oz)   SpO2 93%   BMI 24.60 kg/m²   Gen: alert, no apparent distress  CV: Regular rate, regular rhythm  Resp: Clear to auscultation bilaterally  Neuro: Left neglect left facial weakness    No results found for this or any previous visit (from the past 72 hour(s)).      Scheduled Medications   Medication Dose Frequency    omeprazole  20 mg DAILY    senna-docusate  2 Tablet BID       Current Diet Order   Procedures    Diet Order Diet: Level 6 - Soft and Bite Sized (meds whole 1:1 floated, no straws); Liquid level: Level 0 - Thin       Assessment:    This patient is a 88 y.o. female admitted for acute inpatient rehabilitation with Intraparenchymal hemorrhage of brain (HCC).    I led and attended the weekly conference, and agree with the IDT conference documentation and plan of care as noted below.    Date of conference: 4/26/2023    Goals and barriers: See IDT note.    Biggest barriers: continent/incontinent of bowel, impulsive, dysphagia, left neglect, cognitive impairment    CM/social support: family supportive    Anticipated DC date: 5/5    Home health: PT/OT/SLP/RN    Equip: TBD    Follow up: PCP, stroke bridge clinic,       Problem List/Medical Decision Making and Plan:    Right frontal hemorrhagic stroke  (History of left frontal hemorrhagic stroke in 2021 and 2022)  Etiology secondary to amyloid angiopathy  Left neglect  Left visual field cut  Functional left hemiparesis  Dysphagia  Nondominant  Moderate cognitive impairment  Continue full rehab program  PT/OT/SLP, 1 hr each discipline, 5 days per week    Outpatient follow-up " with [facility] and , referrals made    Hyperlipidemia    No evidence for older patients that statins prevent heart disease, strokes, or death    Encephalopathy  Continue enclosure bed for safety at night, not yet ready for unzip trial  Continue as needed hydroxyzine for sundowning    I certify that the patient currently requires non-pharmacologic restraints. Non-pharmacologic restraints are required to reduce the potential for the patient to harm themselves or others, to limit violent behaviors, and to allow proper assessment and care. I have completed a face to face assessment of this patient on 4/28/2023. Alternative methods including but not limited to de-escalation techniques, redirection, and pharmacologic treatment have been attempted but patient currently remains at risk without restraints. Our staff has been trained on restraints and patient is currently placed in video monitored room.  The need for these restraints is assessed by a rehabilitation physician every 24 hours and use of restraints is minimized when appropriate.  Current diagnosis which requires restraints: Encephalopathy. Current restraints required: Enclosure bed.     GI prophylaxis  Continue omeprazole    Abnormal thyroid studies  TSH mildly elevated at 5.7, normal T4  Outpatient follow up with PCP for repeat studies in 4-6 weeks    Bowels  Bowel incontinence  Continue Senokot  Last bowel movement 4/28    Bladder  With mild retention, resolved  Continue to check PVRs -98, 101  IC for over 400 CC - not requiring    DVT prophylaxis  Contraindicated given hemorrhage.   Compression stockings on in am, off in pm.  Increase mobility. Monitor for swelling.  Patient currently walking 250 feet.      Christine Ji M.D.  Physical Medicine and Rehabilitation

## 2023-04-28 NOTE — CARE PLAN
"  Problem: Fall Risk - Rehab  Goal: Patient will remain free from falls  Outcome: Progressing  Note: Siria Concepcion Fall risk Assessment Score: 28    High fall risk Interventions   - Alarming seatbelt  - Bed and strip alarm   - Yellow sign by the door   - Yellow wrist band \"Fall risk\"  - Room near to the nurse station  - Do not leave patient unattended in the bathroom  - Fall risk education provided       Problem: Pain - Standard  Goal: Alleviation of pain or a reduction in pain to the patient’s comfort goal  Outcome: Progressing  Note: Assessed for pain and discomfort ,denies pain , needs anticipated and attended..   The patient is Stable - Low risk of patient condition declining or worsening    Shift Goals  Clinical Goals: safety  Patient Goals: Safety  Family Goals: support    Progress made toward(s) clinical / shift goals:  Pt free from done and discomfort.      "

## 2023-04-29 ENCOUNTER — APPOINTMENT (OUTPATIENT)
Dept: SPEECH THERAPY | Facility: REHABILITATION | Age: 88
DRG: 056 | End: 2023-04-29
Attending: PHYSICAL MEDICINE & REHABILITATION
Payer: MEDICARE

## 2023-04-29 PROCEDURE — 92526 ORAL FUNCTION THERAPY: CPT

## 2023-04-29 PROCEDURE — 97129 THER IVNTJ 1ST 15 MIN: CPT

## 2023-04-29 PROCEDURE — 99231 SBSQ HOSP IP/OBS SF/LOW 25: CPT | Performed by: PHYSICAL MEDICINE & REHABILITATION

## 2023-04-29 PROCEDURE — 97130 THER IVNTJ EA ADDL 15 MIN: CPT

## 2023-04-29 PROCEDURE — 700102 HCHG RX REV CODE 250 W/ 637 OVERRIDE(OP): Performed by: PHYSICAL MEDICINE & REHABILITATION

## 2023-04-29 PROCEDURE — A9270 NON-COVERED ITEM OR SERVICE: HCPCS | Performed by: PHYSICAL MEDICINE & REHABILITATION

## 2023-04-29 PROCEDURE — 770010 HCHG ROOM/CARE - REHAB SEMI PRIVAT*

## 2023-04-29 RX ADMIN — SENNOSIDES AND DOCUSATE SODIUM 2 TABLET: 50; 8.6 TABLET ORAL at 08:23

## 2023-04-29 RX ADMIN — OMEPRAZOLE 20 MG: 20 CAPSULE, DELAYED RELEASE ORAL at 08:23

## 2023-04-29 ASSESSMENT — PAIN DESCRIPTION - PAIN TYPE: TYPE: ACUTE PAIN

## 2023-04-29 NOTE — PROGRESS NOTES
"  Physical Medicine & Rehabilitation Progress Note    Encounter Date: 4/29/2023    Chief Complaint: Left-sided weakness    Interval Events (Subjective):  Denies any complaints  Staff reports that she is still impulsive and try to get out of bed without requesting assistance-pushing her call light.    Last BM: 04/29/23      Objective:  Vitals: /72   Pulse 80   Temp 36.6 °C (97.9 °F) (Oral)   Resp 18   Ht 1.6 m (5' 3\")   Wt 63 kg (138 lb 14.2 oz)   SpO2 96%   Gen: NAD, Body mass index is 24.6 kg/m².  HEENT:  NC/AT, PERRLA, moist mucous membranes  Cardio: RRR, no mumurs  Pulm: CTAB, with normal respiratory effort  Abd: Soft NTND, active bowel sounds,   Ext: No peripheral edema. No calf tenderness. No clubbing/cyanosis. +dorsal pedalis pulses bilaterally.      Laboratory Values:  No results found for this or any previous visit (from the past 72 hour(s)).    Medications:  Scheduled Medications   Medication Dose Frequency    omeprazole  20 mg DAILY    senna-docusate  2 Tablet BID     PRN medications: hydrOXYzine HCl, acetaminophen **OR** acetaminophen, Respiratory Therapy Consult, senna-docusate **AND** polyethylene glycol/lytes **AND** magnesium hydroxide **AND** bisacodyl, hydrALAZINE    Diet:  Current Diet Order   Procedures    Diet Order Diet: Regular (meds whole 1:1 floated, no straws. Chop meats)       Assessment:  Active Hospital Problems    Diagnosis     *Intraparenchymal hemorrhage of brain (HCC)     Hypertension     Vasogenic brain edema (HCC)        Medical Decision Making and Plan:    Right frontal hemorrhage: History of left frontal hemorrhage in 2021 in 2022, secondary to amyloid angiopathy, left-sided neglect, left visual field cut and left-sided weakness, dysphagia, moderate cognitive impairment, impaired insight  - Continue comprehensive acute inpatient rehabilitation    Encephalopathy: Still impulsive  - Continue veil bed restraint    I certify that the patient currently requires " non-pharmacologic restraints. Non-pharmacologic restraints are required to reduce the potential for the patient to harm themselves or others, to limit violent behaviors, and to allow proper assessment and care. I have completed a face to face assessment of this patient on 4/29/2023. Alternative methods including but not limited to de-escalation techniques, redirection, and pharmacologic treatment have been attempted but patient currently remains at risk without restraints. Our staff has been trained on restraints and patient is currently placed in video monitored room.  The need for these restraints is assessed by a rehabilitation physician every 24 hours and use of restraints is minimized when appropriate.  Current diagnosis which requires restraints: Right-sided hemorrhage. Current restraints required: Veil bed.     ____________________________________    Wenceslao Leal DO  ABP - Physical Medicine & Rehabilitation   [facility] - Spinal Cord Injury Medicine  ____________________________________

## 2023-04-29 NOTE — CARE PLAN
The patient is Stable - Low risk of patient condition declining or worsening    Shift Goals  Clinical Goals: safety, participation in therapies  Patient Goals: independence  Family Goals: support    Progress made toward(s) clinical / shift goals:    Problem: Fall Risk - Rehab  Goal: Patient will remain free from falls  Outcome: Progressing     Problem: Skin Integrity  Goal: Skin integrity is maintained or improved  Outcome: Progressing       Patient is not progressing towards the following goals:

## 2023-04-29 NOTE — CARE PLAN
"The patient is Watcher - Medium risk of patient condition declining or worsening    Problem: Fall Risk - Rehab  Goal: Patient will remain free from falls  Outcome: Progressing  Siria Concepcion Fall risk Assessment Score: 24    High fall risk Interventions   - Alarming seatbelt  - Bed and strip alarm   - Yellow sign by the door   - Yellow wrist band \"Fall risk\"  - Room near to the nurse station  - Do not leave patient unattended in the bathroom  - Fall risk education provided      Problem: Skin Integrity  Goal: Skin integrity is maintained or improved  Outcome: Progressing  Note: Patient's skin remains intact and free from new or accidental injury this shift.  Will continue to monitor.           "

## 2023-04-30 ENCOUNTER — APPOINTMENT (OUTPATIENT)
Dept: SPEECH THERAPY | Facility: REHABILITATION | Age: 88
DRG: 056 | End: 2023-04-30
Attending: PHYSICAL MEDICINE & REHABILITATION
Payer: MEDICARE

## 2023-04-30 PROCEDURE — 770010 HCHG ROOM/CARE - REHAB SEMI PRIVAT*

## 2023-04-30 PROCEDURE — 700102 HCHG RX REV CODE 250 W/ 637 OVERRIDE(OP): Performed by: PHYSICAL MEDICINE & REHABILITATION

## 2023-04-30 PROCEDURE — 99231 SBSQ HOSP IP/OBS SF/LOW 25: CPT | Performed by: PHYSICAL MEDICINE & REHABILITATION

## 2023-04-30 PROCEDURE — 92526 ORAL FUNCTION THERAPY: CPT

## 2023-04-30 PROCEDURE — 97130 THER IVNTJ EA ADDL 15 MIN: CPT

## 2023-04-30 PROCEDURE — 97129 THER IVNTJ 1ST 15 MIN: CPT

## 2023-04-30 PROCEDURE — A9270 NON-COVERED ITEM OR SERVICE: HCPCS | Performed by: PHYSICAL MEDICINE & REHABILITATION

## 2023-04-30 RX ADMIN — SENNOSIDES AND DOCUSATE SODIUM 2 TABLET: 50; 8.6 TABLET ORAL at 19:38

## 2023-04-30 ASSESSMENT — PAIN DESCRIPTION - PAIN TYPE: TYPE: ACUTE PAIN

## 2023-04-30 NOTE — PROGRESS NOTES
"  Physical Medicine & Rehabilitation Progress Note    Encounter Date: 4/30/2023    Chief Complaint: Confusion    Interval Events (Subjective):    Patient was evaluated sitting in her manual wheelchair  The nursing station  She denies any complaints, staff reporting still difficulty with using call light, no significant change in impulsivity or cognitive status    Last BM: 04/29/23    ROS:  Gen: No fatigue, + confusion, significant weight loss  Eyes: no blurry vision, double vision or pain in eyes  ENT: no changes in hearing, runny nose, nose bleeds, sinus pain  CV: No CP, palpitations,   Lungs: no shortness of breath, changes in secretions, changes in cough, pain with coughing  Abd: No abd pain, nausea, vomiting, pain with eating  : no blood in urine, pain during storage phase, bladder spasms, suprapubic pain  Ext: No swelling in legs, asymmetric atrophy  Neuro: no changes in strength or sensation  Skin: no new ulcers/skin breakdown appreciated by patient  Mood: No changes in mood today, increase in depression or anxiety  Heme: no bruising, or bleeding    Objective:  Vitals: /74   Pulse 65   Temp 36.7 °C (98.1 °F) (Oral)   Resp 18   Ht 1.6 m (5' 3\")   Wt 63 kg (138 lb 14.2 oz)   SpO2 94%   Gen: NAD, Body mass index is 24.6 kg/m².  HEENT:  NC/AT, PERRLA, moist mucous membranes  Cardio: RRR, no mumurs  Pulm: CTAB, with normal respiratory effort  Abd: Soft NTND, active bowel sounds,   Ext: No peripheral edema. No calf tenderness. No clubbing/cyanosis. +dorsal pedalis pulses bilaterally.      Laboratory Values:  No results found for this or any previous visit (from the past 72 hour(s)).    Medications:  Scheduled Medications   Medication Dose Frequency    omeprazole  20 mg DAILY    senna-docusate  2 Tablet BID     PRN medications: hydrOXYzine HCl, acetaminophen **OR** acetaminophen, Respiratory Therapy Consult, senna-docusate **AND** polyethylene glycol/lytes **AND** magnesium hydroxide **AND** bisacodyl, " hydrALAZINE    Diet:  Current Diet Order   Procedures    Diet Order Diet: Regular (meds whole 1:1 floated, no straws. Chop meats)       Assessment:  Active Hospital Problems    Diagnosis     *Intraparenchymal hemorrhage of brain (HCC)     Hypertension     Vasogenic brain edema (HCC)        Medical Decision Making and Plan:    Right frontal hemorrhage:  - Continue comprehensive acute inpatient rehabilitation    Encephalopathy: Still impulsive poorly utilizing call light  - Continue veil bed restraints, have patient and nursing station as much as possible during the day    I certify that the patient currently requires non-pharmacologic restraints. Non-pharmacologic restraints are required to reduce the potential for the patient to harm themselves or others, to limit violent behaviors, and to allow proper assessment and care. I have completed a face to face assessment of this patient on 4/30/2023. Alternative methods including but not limited to de-escalation techniques, redirection, and pharmacologic treatment have been attempted but patient currently remains at risk without restraints. Our staff has been trained on restraints and patient is currently placed in video monitored room.  The need for these restraints is assessed by a rehabilitation physician every 24 hours and use of restraints is minimized when appropriate.  Current diagnosis which requires restraints: Encephalopathy. Current restraints required: Veil bed.     ____________________________________    Wenceslao Leal DO  ABPMR - Physical Medicine & Rehabilitation   [facility] - Spinal Cord Injury Medicine  ____________________________________

## 2023-04-30 NOTE — CARE PLAN
"The patient is Stable - Low risk of patient condition declining or worsening      Problem: Fall Risk - Rehab  Goal: Patient will remain free from falls  Outcome: Progressing   Siria Concepcion Fall risk Assessment Score: 24    High fall risk Interventions   - Alarming seatbelt  - Bed and strip alarm   - Yellow sign by the door   - Yellow wrist band \"Fall risk\"  - Room near to the nurse station  - Do not leave patient unattended in the bathroom  - Fall risk education provided      Problem: Skin Integrity  Goal: Skin integrity is maintained or improved  Outcome: Progressing   Note: Patient's skin remains intact and free from new or accidental injury this shift.  Will continue to monitor.             "

## 2023-04-30 NOTE — PROGRESS NOTES
"Problem: Fall Risk - Rehab  Goal: Patient will remain free from falls  Note: Siria Concepcion Fall risk Assessment Score: 24     High fall risk Interventions   - Posey bed  - Alarming seatbelt  - Bed and strip alarm   - Yellow sign by the door   - Yellow wrist band \"Fall risk\"  - Room near to the nurse station  - Do not leave patient unattended in the bathroom  - Fall risk education provided     Problem: Bowel Elimination  Goal: Patient will participate in bowel management program  Note: Pt. LBM 4/29/23. Denies constipation   The patient is Stable - Low risk of patient condition declining or worsening     Shift Goals  Clinical Goals: safety, participation in therapies  Patient Goals: independence  Family Goals: support        "

## 2023-04-30 NOTE — CARE PLAN
"  Problem: Fall Risk - Rehab  Goal: Patient will remain free from falls  Note: Siria Concepcion Fall risk Assessment Score: 24    High fall risk Interventions   - Alarming seatbelt  - Bed and strip alarm   - Yellow sign by the door   - Yellow wrist band \"Fall risk\"  - Room near to the nurse station  - Do not leave patient unattended in the bathroom  - Fall risk education provided     Problem: Bowel Elimination  Goal: Patient will participate in bowel management program  Note: Pt. LBM 4/29/23. Denies constipation   The patient is Stable - Low risk of patient condition declining or worsening    Shift Goals  Clinical Goals: safety, participation in therapies  Patient Goals: independence  Family Goals: support  "

## 2023-04-30 NOTE — THERAPY
Speech Language Pathology  Daily Treatment     Patient Name: Farhan Tanner  Age:  88 y.o., Sex:  female  Medical Record #: [id number]  Today's Date: 4/30/2023     Precautions  Precautions: Fall Risk  Comments: L neglect, L deepika, aspiration risk    Subjective    Patient was willing to participate.      Objective       04/30/23 1102   Treatment Charges   SLP Swallowing Dysfunction Treatment Swallowing Dysfunction Treatment   SLP Cognitive Skill Development First 15 Minutes 1   SLP Cognitive Skill Development Additional 15 Minutes 1   SLP Total Time Spent   SLP Individual Total Time Spent (Mins) 60   Dysphagia    Diet / Liquid Recommendation Thin (0);Regular (7)   Nutritional Liquid Intake Rating Scale Non thickened beverages   Nutritional Food Intake Rating Scale Total oral diet with no restrictions         Assessment    Pt assessed with regular textures/thin liquids. Min cues needed for self monitoring and clearing pocketing from left lower sulci. No overt s/sx of pen/asp, no anterior spillage noted with food or liquids this session.  Patient required mod cues for verbal attention tasks.     Strengths: Pleasant and cooperative, Supportive family, Willingly participates in therapeutic activities, Motivated for self care and independence, Independent prior level of function  Barriers: Impaired functional cognition, Impaired insight/denial of deficits, Impaired carryover of learning    Plan    Ongoing supervision in therapeutic dining and cues to attend to left side.       Speech Therapy Problems (Active)       Problem: Memory STGs       Dates: Start: 04/23/23         Goal: STG-Within one week, patient will       Dates: Start: 04/23/23       Description: 1) Individualized goal:  recall novel information r/t hospitalization with external memory aids with 80% accuracy and MIN A.   2) Interventions:  SLP Self Care / ADL Training , SLP Cognitive Skill Development, and SLP Group Treatment          Goal Note filed on  04/25/23 1705 by Gayla Dill MS,CCC-SLP       Will continue to target.                  Problem: Problem Solving STGs       Dates: Start: 04/25/23         Goal: STG-Within one week, patient will complete visual scanning/cancellation tasks with adherence to left margin/visual field to achieve 80% accuracy provided MOD A       Dates: Start: 04/25/23            Goal: STG-Within one week, patient will perform single target visual scanning tasks with 100% with min-set up cuing and use of compensatory strategies.       Dates: Start: 04/26/23               Problem: Speech/Swallowing LTGs       Dates: Start: 04/23/23         Goal: LTG-By discharge, patient will safely swallow       Dates: Start: 04/23/23       Description: 1) Individualized goal:  the least restrictive diet texture and thin liquids with no overt clinical s/sx of aspiration implementing safe swallow strategies with 90% accuracy and MOD I for a safe D/C home w/ family.   2) Interventions:  SLP Swallowing Dysfunction Treatment, SLP Video Swallow Evaluation, SLP Self Care / ADL Training , and SLP Group Treatment             Goal: LTG-By discharge, patient will solve complex problem       Dates: Start: 04/23/23       Description: 1) Individualized goal:  related to health and safety with 80% accuracy and MOD I for a safe D/C home with family.   2) Interventions:  SLP Self Care / ADL Training , SLP Cognitive Skill Development, and SLP Group Treatment                Problem: Swallowing STGs       Dates: Start: 04/23/23

## 2023-05-01 ENCOUNTER — APPOINTMENT (OUTPATIENT)
Dept: PHYSICAL THERAPY | Facility: REHABILITATION | Age: 88
DRG: 056 | End: 2023-05-01
Attending: PHYSICAL MEDICINE & REHABILITATION
Payer: MEDICARE

## 2023-05-01 ENCOUNTER — APPOINTMENT (OUTPATIENT)
Dept: OCCUPATIONAL THERAPY | Facility: REHABILITATION | Age: 88
DRG: 056 | End: 2023-05-01
Attending: PHYSICAL MEDICINE & REHABILITATION
Payer: MEDICARE

## 2023-05-01 ENCOUNTER — APPOINTMENT (OUTPATIENT)
Dept: SPEECH THERAPY | Facility: REHABILITATION | Age: 88
DRG: 056 | End: 2023-05-01
Attending: PHYSICAL MEDICINE & REHABILITATION
Payer: MEDICARE

## 2023-05-01 PROCEDURE — 770010 HCHG ROOM/CARE - REHAB SEMI PRIVAT*

## 2023-05-01 PROCEDURE — 97129 THER IVNTJ 1ST 15 MIN: CPT

## 2023-05-01 PROCEDURE — A9270 NON-COVERED ITEM OR SERVICE: HCPCS | Performed by: PHYSICAL MEDICINE & REHABILITATION

## 2023-05-01 PROCEDURE — 97116 GAIT TRAINING THERAPY: CPT

## 2023-05-01 PROCEDURE — 97112 NEUROMUSCULAR REEDUCATION: CPT

## 2023-05-01 PROCEDURE — 700102 HCHG RX REV CODE 250 W/ 637 OVERRIDE(OP): Performed by: PHYSICAL MEDICINE & REHABILITATION

## 2023-05-01 PROCEDURE — 97130 THER IVNTJ EA ADDL 15 MIN: CPT

## 2023-05-01 PROCEDURE — 99232 SBSQ HOSP IP/OBS MODERATE 35: CPT | Performed by: PHYSICAL MEDICINE & REHABILITATION

## 2023-05-01 PROCEDURE — 97530 THERAPEUTIC ACTIVITIES: CPT

## 2023-05-01 PROCEDURE — 92526 ORAL FUNCTION THERAPY: CPT

## 2023-05-01 RX ORDER — CETIRIZINE HYDROCHLORIDE 10 MG/1
10 TABLET ORAL DAILY
Status: DISCONTINUED | OUTPATIENT
Start: 2023-05-01 | End: 2023-05-04 | Stop reason: HOSPADM

## 2023-05-01 RX ADMIN — SENNOSIDES AND DOCUSATE SODIUM 2 TABLET: 50; 8.6 TABLET ORAL at 09:07

## 2023-05-01 RX ADMIN — OMEPRAZOLE 20 MG: 20 CAPSULE, DELAYED RELEASE ORAL at 09:07

## 2023-05-01 ASSESSMENT — BALANCE ASSESSMENTS
STANDING ON ONE LEG: 0
TURN 360 DEGREES: 1
LONG VERSION TOTAL SCORE (MAX 56): 31
PLACE ALTERNATE FOOT ON STEP OR STOOL WHILE STANDING UNSUPPORTED: 1
SITTING TO STANDING: 4
REACHING FORWARD WITH OUTSTRETCHED ARM WHILE STANDING: 1
LONG VERSION TOTAL SCORE (MAX 56): 31
STANDING UNSUPPORTED ONE FOOT IN FRONT: 2
PICK UP OBJECT FROM THE FLOOR FROM A STANDING POSITION: 3
STANDING UNSUPPORTED WITH EYES CLOSED: 3
STANDING UNSUPPORTED WITH FEET TOGETHER: 0
STANDING UNSUPPORTED: 4
SITTING UNSUPPORTED: 4
STANDING TO SITTING: 4
TRANSFERS: 3
LOOK OVER LEFT AND RIGHT SHOULDERS WHILE STANDING: 1

## 2023-05-01 ASSESSMENT — PAIN DESCRIPTION - PAIN TYPE: TYPE: ACUTE PAIN

## 2023-05-01 ASSESSMENT — GAIT ASSESSMENTS
DEVIATION: DECREASED BASE OF SUPPORT;SHUFFLED GAIT
DISTANCE (FEET): 20
GAIT LEVEL OF ASSIST: MINIMAL ASSIST

## 2023-05-01 ASSESSMENT — ACTIVITIES OF DAILY LIVING (ADL): BED_CHAIR_WHEELCHAIR_TRANSFER_DESCRIPTION: INCREASED TIME;SUPERVISION FOR SAFETY;VERBAL CUEING

## 2023-05-01 NOTE — CARE PLAN
"The patient is Stable - Low risk of patient condition declining or worsening    Problem: Fall Risk - Rehab  Goal: Patient will remain free from falls  Outcome: Progressing   Siria Concepcion Fall risk Assessment Score: 24    High fall risk Interventions   - Alarming seatbelt  - Bed and strip alarm   - Yellow sign by the door   - Yellow wrist band \"Fall risk\"  - Room near to the nurse station  - Do not leave patient unattended in the bathroom  - Fall risk education provided  - Soma bed            "

## 2023-05-01 NOTE — CARE PLAN
"The patient is Stable - Low risk of patient condition declining or worsening      Problem: Nutrition  Goal: Patient's nutritional and fluid intake will be adequate or improve  Outcome: Progressing  Note: Patient free from s/s dehydration.  Oral and buccal mucosa pink and moist; conjunctiva moist; skin turgor good.  PO intake adequate.     Problem: Fall Risk - Rehab  Goal: Patient will remain free from falls  Outcome: Progressing  Note: Siria Concepcion Fall risk Assessment Score: 24    High fall risk Interventions   - Alarming seatbelt  - Wander guard  - Bed and strip alarm   - Yellow sign by the door   - Yellow wrist band \"Fall risk\"  - Room near to the nurse station  - Do not leave patient unattended in the bathroom  - Fall risk education provided     "

## 2023-05-01 NOTE — THERAPY
"Occupational Therapy  Daily Treatment     Patient Name: Farhan Tanner  Age:  88 y.o., Sex:  female  Medical Record #: [id number]  Today's Date: 5/1/2023     Precautions  Precautions: Fall Risk  Comments: L neglect, L deepika, aspiration risk    Safety   Comments: See functional levels of assist for ADL performance details    Subjective    \"What's the point of this? I can just use my right arm\" during LUE coordination activities.      Objective     05/01/23 1401   OT Charge Group   OT Therapy Activity (Units) 4   OT Total Time Spent   OT Individual Total Time Spent (Mins) 60   Interdisciplinary Plan of Care Collaboration   IDT Collaboration with  Occupational Therapist   Patient Position at End of Therapy Seated;Chair Alarm On;Self Releasing Lap Belt Applied  (seated at nurse's station)   Collaboration Comments POC/CLOF discussed with primary OT     Provided pt with new L arm w/c arm tray as previous few have gone missing. Added orange tape for location of hand placement and a foam roll \"bumper\" on the edge as an additional visual and tactile cue to keep LUE on tray.     LUE coordination: picking up large color block shapes to place back into according slots with AAROM to maintain  on knobs, picking up squish balls one at a time and reaching in varying directions to place on targets then returning one at a time to the bucket, balloon volley seated in w/c with max-total cues to use LUE and attend to positioning     L visual scanning activity on tabletop whiteboard: placing a dot in every shape scrambled across the board, connecting letters A-E and numbers 1-9 in order    Assessment    Pt continues to demo severe left neglect with poor attention to LUE positioning without the arm tray and two visual cues, constantly letting the LUE fall behind her outside the w/c arm rest during balloon volley and only attends back to it with max-total cues. Able to  squish balls with verbal cues for approaching the ball " with her fingers open then closing fingers around it once her hand was on it. Relies on supinating her forearm to then maintain grasp when reaching forward or to the side to place on target. Unable to maintain  on knobs of colored shape blocks but able to guide to slot with AAROM to maintain . Poor motivation to utilize the LUE in therapy activities, education provided on importance of using the LUE to maximize functional return during this time period.     Strengths: Independent prior level of function, Making steady progress towards goals, Manages pain appropriately, Motivated for self care and independence, Pleasant and cooperative, Willingly participates in therapeutic activities  Barriers: Confused, Decreased endurance, Difficulty following instructions, Fatigue, Generalized weakness, Hemiplegia, Impaired activity tolerance, Impaired balance, Impulsive, Visual impairment    Plan    Add anchor tape to L arm tray if needed     ADLs, LUE functional use/strengthening/coordination/attention    Passport items to be completed:  Perform bathroom transfers, complete dressing, complete feeding, get ready for the day, prepare a simple meal, participate in household tasks, adapt home for safety needs, demonstrate home exercise program, complete caregiver training     Occupational Therapy Goals (Active)       Problem: Dressing       Dates: Start: 04/23/23         Goal: STG-Within one week, patient will dress LB with mod A overall using AE/DME as needed.       Dates: Start: 04/23/23               Problem: Grooming       Dates: Start: 04/23/23         Goal: STG-Within one week, patient will complete grooming with CGA for standing at sink using AE/DME as needed.       Dates: Start: 04/23/23               Problem: OT Long Term Goals       Dates: Start: 04/23/23         Goal: LTG-By discharge, patient will complete basic self care tasks with supervision-mod I overall using AE/DME as needed.       Dates: Start: 04/23/23             Goal: LTG-By discharge, patient will perform bathroom transfers with supervision-mod I overall using AE/DME as needed.       Dates: Start: 04/23/23               Problem: Toileting       Dates: Start: 04/23/23         Goal: STG-Within one week, patient will complete toileting tasks with mod A overall using AE/DME as needed.       Dates: Start: 04/23/23

## 2023-05-01 NOTE — THERAPY
Speech Language Pathology  Daily Treatment     Patient Name: Farhan Tanner  Age:  88 y.o., Sex:  female  Medical Record #: [id number]  Today's Date: 5/1/2023     Precautions  Precautions: Fall Risk  Comments: L neglect, L deepika, aspiration risk    Subjective    Pt was pleasant and cooperative during this ST session      Objective       05/01/23 1101   Treatment Charges   SLP Swallowing Dysfunction Treatment Swallowing Dysfunction Treatment   SLP Cognitive Skill Development First 15 Minutes 1   SLP Cognitive Skill Development Additional 15 Minutes 1   SLP Total Time Spent   SLP Individual Total Time Spent (Mins) 60         Assessment    Pt completed a functional reading task requiring her to follow 1 and 2 step written directions.  Pt was able to follow 1 step directions independently with 100% accuracy, 2 step directions independently with 90% accuracy and more complex written directions independently with 80% accuracy.  No cues were required during this task for left neglect.  Pt was also see in t-dine during lunch with 7- Regular Textures and 0-Thin liquids.  Pt required assistance opening packages and putting ketchup on her burgers.  Pt tolerated all textures with one delayed cough noted after a drink of liquids.  No other s/sx of aspiration were observed during this meal.  Pt with limited PO intake this session despite continued encouragement to eat more.      Strengths: Pleasant and cooperative, Supportive family, Willingly participates in therapeutic activities, Motivated for self care and independence, Independent prior level of function  Barriers: Impaired functional cognition, Impaired insight/denial of deficits, Impaired carryover of learning    Plan    Continue in t-dine for assistance with self feeding and cues for left neglect, continue targeting visual scanning, continue POC       Speech Therapy Problems (Active)       Problem: Memory STGs       Dates: Start: 04/23/23         Goal: STG-Within one  week, patient will       Dates: Start: 04/23/23       Description: 1) Individualized goal:  recall novel information r/t hospitalization with external memory aids with 80% accuracy and MIN A.   2) Interventions:  SLP Self Care / ADL Training , SLP Cognitive Skill Development, and SLP Group Treatment          Goal Note filed on 04/25/23 1705 by Gayla Dill MS,CCC-SLP       Will continue to target.                  Problem: Problem Solving STGs       Dates: Start: 04/25/23         Goal: STG-Within one week, patient will complete visual scanning/cancellation tasks with adherence to left margin/visual field to achieve 80% accuracy provided MOD A       Dates: Start: 04/25/23            Goal: STG-Within one week, patient will perform single target visual scanning tasks with 100% with min-set up cuing and use of compensatory strategies.       Dates: Start: 04/26/23               Problem: Speech/Swallowing LTGs       Dates: Start: 04/23/23         Goal: LTG-By discharge, patient will safely swallow       Dates: Start: 04/23/23       Description: 1) Individualized goal:  the least restrictive diet texture and thin liquids with no overt clinical s/sx of aspiration implementing safe swallow strategies with 90% accuracy and MOD I for a safe D/C home w/ family.   2) Interventions:  SLP Swallowing Dysfunction Treatment, SLP Video Swallow Evaluation, SLP Self Care / ADL Training , and SLP Group Treatment             Goal: LTG-By discharge, patient will solve complex problem       Dates: Start: 04/23/23       Description: 1) Individualized goal:  related to health and safety with 80% accuracy and MOD I for a safe D/C home with family.   2) Interventions:  SLP Self Care / ADL Training , SLP Cognitive Skill Development, and SLP Group Treatment                Problem: Swallowing STGs       Dates: Start: 04/23/23

## 2023-05-01 NOTE — PROGRESS NOTES
"Rehab Progress Note     Date of Service: 5/1/2023  Chief Complaint: Follow-up hemorrhagic stroke    Interval Events (Subjective)    Patient seen and examined today in the hallway.  She denies any pain.  She continues in the enclosure bed.  She is complaining of a runny nose which she reports she has intermittently.  She denies a sore throat.  She denies a history of allergies however.  Therapy reports she continues to be limited by significant left neglect.  Patient last moved her bowels on the 29th.    Patient has no other new questions, concerns, or complaints today.         Objective:  VITAL SIGNS: /70   Pulse 74   Temp 36.2 °C (97.1 °F)   Resp 18   Ht 1.6 m (5' 3\")   Wt 63 kg (138 lb 14.2 oz)   SpO2 95%   BMI 24.60 kg/m²   Gen: alert, no apparent distress  HEENT: Rhinorrhea  CV: Regular rate, regular rhythm  Resp: Clear to auscultation bilaterally  Neuro: Severe left neglect      No results found for this or any previous visit (from the past 72 hour(s)).      Scheduled Medications   Medication Dose Frequency    omeprazole  20 mg DAILY    senna-docusate  2 Tablet BID       Current Diet Order   Procedures    Diet Order Diet: Regular (meds whole 1:1 floated, no straws. Chop meats)       Assessment:    This patient is a 88 y.o. female admitted for acute inpatient rehabilitation with Intraparenchymal hemorrhage of brain (HCC).    I led and attended the weekly conference, and agree with the IDT conference documentation and plan of care as noted below.    Date of conference: 4/26/2023    Goals and barriers: See IDT note.    Biggest barriers: continent/incontinent of bowel, impulsive, dysphagia, left neglect, cognitive impairment    CM/social support: family supportive    Anticipated DC date: 5/5    Home health: PT/OT/SLP/RN    Equip: TBD    Follow up: PCP, stroke bridge clinic,       Problem List/Medical Decision Making and Plan:    Right frontal hemorrhagic stroke  (History of left frontal " hemorrhagic stroke in 2021 and 2022)  Etiology secondary to amyloid angiopathy  Left neglect, continues  Left visual field cut, continues  Functional left hemiparesis, continues  Dysphagia, improved  Nondominant  Moderate cognitive impairment, continues  Continue full rehab program  PT/OT/SLP, 1 hr each discipline, 5 days per week    Outpatient follow-up with stroke [facility] clinic and , referrals made    Hyperlipidemia    No evidence for older patients that statins prevent heart disease, strokes, or death    Encephalopathy  Continue enclosure bed for safety at night, not yet ready for unzip trial  Continue as needed hydroxyzine for any sundowning    I certify that the patient currently requires non-pharmacologic restraints. Non-pharmacologic restraints are required to reduce the potential for the patient to harm themselves or others, to limit violent behaviors, and to allow proper assessment and care. I have completed a face to face assessment of this patient on 5/1/2023. Alternative methods including but not limited to de-escalation techniques, redirection, and pharmacologic treatment have been attempted but patient currently remains at risk without restraints. Our staff has been trained on restraints and patient is currently placed in video monitored room.  The need for these restraints is assessed by a rehabilitation physician every 24 hours and use of restraints is minimized when appropriate.  Current diagnosis which requires restraints: Encephalopathy. Current restraints required: Enclosure bed.     Rhinorrhea  Start cetirizine    GI prophylaxis  Continue omeprazole    Abnormal thyroid studies  TSH mildly elevated at 5.7, normal T4  Outpatient follow up with PCP for repeat studies in 4-6 weeks    Bowels  Bowel incontinence, intermittent  Continue Senokot  Last bowel movement 4/29    Bladder  With mild retention, resolved  Continue to check PVRs -98, 101  IC for over 400 CC - not requiring    DVT  prophylaxis  Contraindicated given hemorrhage.   Compression stockings on in am, off in pm.  Increase mobility. Monitor for swelling.  Patient currently walking 250 feet.      Christine Ji M.D.  Physical Medicine and Rehabilitation

## 2023-05-01 NOTE — THERAPY
"Physical Therapy   Daily Treatment     Patient Name: Farhan Tanner  Age:  88 y.o., Sex:  female  Medical Record #: [id number]  Today's Date: 5/1/2023     Precautions  Precautions: Fall Risk  Comments: L neglect, L deepika, aspiration risk    Subjective    \"I need to sit down\"     Objective       05/01/23 0831   Gait Functional Level of Assist    Gait Level Of Assist Minimal Assist   Assistive Device None   Distance (Feet) 20   # of Times Distance was Traveled 1   Deviation Decreased Base Of Support;Shuffled Gait  (path deviation L)   Transfer Functional Level of Assist   Bed, Chair, Wheelchair Transfer Contact Guard Assist   Bed Chair Wheelchair Transfer Description Increased time;Supervision for safety;Verbal cueing  (stand-pivot)   Bed Mobility    Sit to Stand Standby Assist   Neuro-Muscular Treatments   Neuro-Muscular Treatments Facilitation;Tactile Cuing;Verbal Cuing;Postural Facilitation  (dynamic standing balance)   Bond Balance Scale   Sitting Unsupported (Score 0-4) 4   Change Of Positon: Sitting To Standing (Score 0-4) 4   Change Of Positon: Standing To Sitting (Score 0-4) 4   Transfers (Score 0-4) 3   Standing Unsupported (Score 0-4) 4   Standing With Eyes Closed (Score 0-4) 3   Standing With Feet Together (Score 0-4) 0   Tandem Standing (Score 0-4) 2   Standing On One Leg (Score 0-4) 0   Turning Trunk (Feet Fixed) (Score 0-4) 1   Retrieving Objects From Floor (Score 0-4) 3   Turning 360 Degrees (Score 0-4) 1   Stool Stepping (Score 0-4) 1   Reaching Forward While Standing (Score 0-4) 1   Bond Balance Total Score (0-56) 31   Interdisciplinary Plan of Care Collaboration   IDT Collaboration with  Nursing   Patient Position at End of Therapy Chair Alarm On;Seated;Self Releasing Lap Belt Applied;Tray Table within Reach  (nurses station)   Collaboration Comments med administration     NMRE  Lateral stepping over obstacles w/ BUE support in parallel bars, Noemy 3 x 6 bilaterally. Required intermittent cuing for " sequencing and increasing step size w/ LLE. Manual assistance provided to keep L hand on rail.    Assessment    Pt increased score on rudd from 27 to 31, demonstrating most difficulty w/ tasks involving decreased TASH. Pt appeared to fatigue more rapidly and have more difficulty following instructions than normal, affecting her balance as well. No report of headache or dizziness.  Strengths: Able to follow instructions, Effective communication skills, Independent prior level of function, Pleasant and cooperative, Willingly participates in therapeutic activities  Barriers: Confused, Decreased endurance, Fatigue, Hemiparesis, Home accessibility, Impaired activity tolerance, Impaired balance, Impaired carryover of learning, Impaired insight/denial of deficits, Impaired functional cognition, Impulsive, Limited mobility, Visual impairment    Plan    Gait training SPC scanning L and path-finding, L hemibody NRE, dual task, dynamic standing balance with forced use L UE and crossing midline  Monday: reassess Rudd     Passport items to be completed:  Get in/out of bed safely, in/out of a vehicle, safely use mobility device, walk or wheel around home/community, navigate up and down stairs, show how to get up/down from the ground, ensure home is accessible, demonstrate HEP, complete caregiver training      Physical Therapy Problems (Active)       Problem: Mobility       Dates: Start: 04/23/23         Goal: STG-Within one week, patient will ambulate household distance 150 ft with no AD vs LRAD and CGA.       Dates: Start: 04/23/23            Goal: STG-Within one week, patient will ascend and descend 2-3 stairs with CGA and BHR prn.       Dates: Start: 04/23/23               Problem: PT-Long Term Goals       Dates: Start: 04/23/23         Goal: LTG-By discharge, patient will ambulate 200 ft x3 indoors/ outdoors with no AD vs LRAD and SPV.       Dates: Start: 04/23/23            Goal: LTG-By discharge, patient will transfer one  surface to another with no AD vs LRAD and SPV.       Dates: Start: 04/23/23            Goal: LTG-By discharge, patient will ambulate up/down 2-3 stairs with SPV, no AD vs LRAD, and no HR to simulate home entrance.       Dates: Start: 04/23/23            Goal: LTG-By discharge, patient will transfer in/out of a car with no AD vs LRAD and SPV after set up.       Dates: Start: 04/23/23

## 2023-05-02 ENCOUNTER — APPOINTMENT (OUTPATIENT)
Dept: SPEECH THERAPY | Facility: REHABILITATION | Age: 88
DRG: 056 | End: 2023-05-02
Attending: PHYSICAL MEDICINE & REHABILITATION
Payer: MEDICARE

## 2023-05-02 ENCOUNTER — APPOINTMENT (OUTPATIENT)
Dept: PHYSICAL THERAPY | Facility: REHABILITATION | Age: 88
DRG: 056 | End: 2023-05-02
Attending: PHYSICAL MEDICINE & REHABILITATION
Payer: MEDICARE

## 2023-05-02 ENCOUNTER — APPOINTMENT (OUTPATIENT)
Dept: OCCUPATIONAL THERAPY | Facility: REHABILITATION | Age: 88
DRG: 056 | End: 2023-05-02
Attending: PHYSICAL MEDICINE & REHABILITATION
Payer: MEDICARE

## 2023-05-02 PROCEDURE — 700102 HCHG RX REV CODE 250 W/ 637 OVERRIDE(OP): Performed by: PHYSICAL MEDICINE & REHABILITATION

## 2023-05-02 PROCEDURE — 92526 ORAL FUNCTION THERAPY: CPT

## 2023-05-02 PROCEDURE — 97130 THER IVNTJ EA ADDL 15 MIN: CPT

## 2023-05-02 PROCEDURE — A9270 NON-COVERED ITEM OR SERVICE: HCPCS | Performed by: PHYSICAL MEDICINE & REHABILITATION

## 2023-05-02 PROCEDURE — 97129 THER IVNTJ 1ST 15 MIN: CPT

## 2023-05-02 PROCEDURE — 97535 SELF CARE MNGMENT TRAINING: CPT

## 2023-05-02 PROCEDURE — 97110 THERAPEUTIC EXERCISES: CPT

## 2023-05-02 PROCEDURE — 99232 SBSQ HOSP IP/OBS MODERATE 35: CPT | Performed by: PHYSICAL MEDICINE & REHABILITATION

## 2023-05-02 PROCEDURE — 97112 NEUROMUSCULAR REEDUCATION: CPT

## 2023-05-02 PROCEDURE — 770010 HCHG ROOM/CARE - REHAB SEMI PRIVAT*

## 2023-05-02 RX ADMIN — CETIRIZINE HYDROCHLORIDE 10 MG: 10 TABLET, FILM COATED ORAL at 09:08

## 2023-05-02 RX ADMIN — OMEPRAZOLE 20 MG: 20 CAPSULE, DELAYED RELEASE ORAL at 09:07

## 2023-05-02 RX ADMIN — SENNOSIDES AND DOCUSATE SODIUM 2 TABLET: 50; 8.6 TABLET ORAL at 09:07

## 2023-05-02 ASSESSMENT — PAIN DESCRIPTION - PAIN TYPE: TYPE: ACUTE PAIN

## 2023-05-02 ASSESSMENT — GAIT ASSESSMENTS
DISTANCE (FEET): 150
GAIT LEVEL OF ASSIST: MINIMAL ASSIST

## 2023-05-02 NOTE — THERAPY
"Physical Therapy   Daily Treatment     Patient Name: Farhan Tanner  Age:  88 y.o., Sex:  female  Medical Record #: [id number]  Today's Date: 5/2/2023     Precautions  Precautions: Fall Risk  Comments: L neglect, L deepika, aspiration risk    Subjective    \"My thumb is no use to me\"     Objective       05/02/23 0931   Gait Functional Level of Assist    Gait Level Of Assist Minimal Assist  (CGA-Noemy, occasional deviations L)   Assistive Device None   Distance (Feet) 150   # of Times Distance was Traveled 2   Deviation Decreased Base Of Support;Bradykinetic;Decreased Heel Strike;Shuffled Gait   Standing Lower Body Exercises   Step Up 2 sets of 10;Bilateral  (6\" step, unilateral UE support)   Bed Mobility    Sit to Stand Standby Assist   Neuro-Muscular Treatments   Neuro-Muscular Treatments Joint Approximation;Weight Shift Right;Weight Shift Left;Facilitation;Tactile Cuing;Verbal Cuing  (see note)   Interdisciplinary Plan of Care Collaboration   IDT Collaboration with  Family / Caregiver   Patient Position at End of Therapy Seated;Chair Alarm On;Self Releasing Lap Belt Applied;Family / Friend in Room  (at nursing station)   Collaboration Comments CLOF     NMRE:  Static standing balance: maintaining balance on incline board in parallel bars 30sec x 2 eyes open 15sec x 1 eyes closed each direction. Pt able to stand w/o UE support and close SBA  Dynamic standing balance: standing on incline using LUE to grasp objects and transfer across midline x 10, in parallel bars SBA-CGA w/o UE support  Quadruped reaching to midline x 10 bilaterally and reaching across midline x 5 bilaterally, Noemy providing approximation and stabilizing LUE    Assessment    Pt demonstrates improving function in LUE and ability to grasp items, however she continues to neglect the UE, letting it run into objects or get caught during transfers. She is able to ambulate w/ CGA when undistracted, demonstrating increased gait deviations w/ fatigue and dual " tasking.  Strengths: Able to follow instructions, Effective communication skills, Independent prior level of function, Pleasant and cooperative, Willingly participates in therapeutic activities  Barriers: Confused, Decreased endurance, Fatigue, Hemiparesis, Home accessibility, Impaired activity tolerance, Impaired balance, Impaired carryover of learning, Impaired insight/denial of deficits, Impaired functional cognition, Impulsive, Limited mobility, Visual impairment    Plan    Gait training SPC scanning L and path-finding, L hemibody NRE, dual task, dynamic standing balance with forced use L UE and crossing midline       Passport items to be completed:  Get in/out of bed safely, in/out of a vehicle, safely use mobility device, walk or wheel around home/community, navigate up and down stairs, show how to get up/down from the ground, ensure home is accessible, demonstrate HEP, complete caregiver training        Physical Therapy Problems (Active)       Problem: Mobility       Dates: Start: 04/23/23         Goal: STG-Within one week, patient will ambulate household distance 150 ft with no AD vs LRAD and CGA.       Dates: Start: 04/23/23            Goal: STG-Within one week, patient will ascend and descend 2-3 stairs with CGA and BHR prn.       Dates: Start: 04/23/23               Problem: PT-Long Term Goals       Dates: Start: 04/23/23         Goal: LTG-By discharge, patient will ambulate 200 ft x3 indoors/ outdoors with no AD vs LRAD and SPV.       Dates: Start: 04/23/23            Goal: LTG-By discharge, patient will transfer one surface to another with no AD vs LRAD and SPV.       Dates: Start: 04/23/23            Goal: LTG-By discharge, patient will ambulate up/down 2-3 stairs with SPV, no AD vs LRAD, and no HR to simulate home entrance.       Dates: Start: 04/23/23            Goal: LTG-By discharge, patient will transfer in/out of a car with no AD vs LRAD and SPV after set up.       Dates: Start: 04/23/23

## 2023-05-02 NOTE — PROGRESS NOTES
"Rehab Progress Note     Date of Service: 5/2/2023  Chief Complaint: Follow-up hemorrhagic stroke    Interval Events (Subjective)    Patient seen and examined today in her room.  Her daughter is present.  We discussed her upcoming discharge as well as her prognosis for recovery from her neglect.  Patient denies any pain.  Her other children have been present in the last several days as well as her daughter.  Family training will need to be completed prior to discharge.  Patient last moved her bowels yesterday.  She is no longer requiring the enclosure bed as she tolerated unzip trial last night.      Objective:  VITAL SIGNS: /72   Pulse 76   Temp 36.6 °C (97.9 °F) (Oral)   Resp 17   Ht 1.6 m (5' 3\")   Wt 63 kg (138 lb 14.2 oz)   SpO2 98%   BMI 24.60 kg/m²   Gen: alert, no apparent distress  CV: Regular rate, regular rhythm  Resp: Clear to auscultation bilaterally  Neuro: Severe left neglect      No results found for this or any previous visit (from the past 72 hour(s)).      Scheduled Medications   Medication Dose Frequency    cetirizine  10 mg DAILY    omeprazole  20 mg DAILY    senna-docusate  2 Tablet BID       Current Diet Order   Procedures    Diet Order Diet: Regular (meds whole 1:1 floated, no straws. Chop meats)       Assessment:    This patient is a 88 y.o. female admitted for acute inpatient rehabilitation with Intraparenchymal hemorrhage of brain (HCC).    I led and attended the weekly conference, and agree with the IDT conference documentation and plan of care as noted below.    Date of conference: 4/26/2023    Goals and barriers: See IDT note.    Biggest barriers: continent/incontinent of bowel, impulsive, dysphagia, left neglect, cognitive impairment    CM/social support: family supportive    Anticipated DC date: 5/5    Home health: PT/OT/SLP/RN    Equip: TBD    Follow up: PCP, stroke bridge clinic,       Problem List/Medical Decision Making and Plan:    Right frontal hemorrhagic " stroke  (History of left frontal hemorrhagic stroke in 2021 and 2022)  Etiology secondary to amyloid angiopathy  Left neglect, continues  Left visual field cut, continues  Functional left hemiparesis, continues  Dysphagia, improved  Nondominant  Moderate cognitive impairment, continues  Continue full rehab program  PT/OT/SLP, 1 hr each discipline, 5 days per week    Outpatient follow-up with stroke [facility] clinic and , referrals made    Hyperlipidemia    No evidence for older patients that statins prevent heart disease, strokes, or death    Encephalopathy, resolved  Enclosure bed discontinued    Rhinorrhea, improved  Continue cetirizine    GI prophylaxis  Continue omeprazole    Abnormal thyroid studies  TSH mildly elevated at 5.7, normal T4  Outpatient follow up with PCP for repeat studies in 4-6 weeks    Bowels  Bowel incontinence, intermittent  Continue Senokot  Last bowel movement 5/1    Bladder  With mild retention, resolved  Continue to check PVRs -98, 101  IC for over 400 CC - not requiring    DVT prophylaxis  Contraindicated given hemorrhage.   Compression stockings on in am, off in pm.  Increase mobility. Monitor for swelling.  Patient currently walking 250 feet.      Christine Ji M.D.  Physical Medicine and Rehabilitation

## 2023-05-02 NOTE — THERAPY
Occupational Therapy  Daily Treatment     Patient Name: Farhan Tanner  Age:  88 y.o., Sex:  female  Medical Record #: [id number]  Today's Date: 5/2/2023     Precautions  Precautions: Fall Risk  Comments: L neglect, L deepika, aspiration risk    Safety   Comments: See functional levels of assist for ADL performance details    Subjective    Patient seated in w/c w/ daughter present upon arrival. Agreeable to participate in OT.      Objective     05/02/23 1401   OT Charge Group   OT Self Care / ADL (Units) 4   OT Total Time Spent   OT Individual Total Time Spent (Mins) 60   Vitals   O2 Delivery Device None - Room Air   Cognition    Level of Consciousness Alert   Sleep/Wake Cycle   Sleep & Rest Awake   Functional Level of Assist   Bathing Minimal Assist  (for thoroughness, cues to incorporate LUE, balance, safety; incorporated sitting and standing w/ GB)   Upper Body Dressing Moderate Assist  (to don long sleeve turtle neck (for threading LUE, pulling shirt down and cues for deepika technique))   Lower Body Dressing Maximal Assist  (for threading LLE, pants over hips pursuit (primarily on L side), cues for deepika technique, balance, safety)   Tub / Shower Transfers Minimal Assist  (room <> shower w/ SPC)   Interdisciplinary Plan of Care Collaboration   Patient Position at End of Therapy Seated;Family / Friend in Room;Chair Alarm On     Family training completed w/ daughterSolange. Reviewed patient's CLOF, adaptive techniques, safety recommendations for ADL routine w/ emphasis on attention to L side. Daughter verbalized understanding.     Assessment    Patient tolerated OT session well with focus on family training and ADLs. Patient demo's improved attention to LUE during shower however continues to require cues to attend to consistently/recall deepika techniques. Patient to DC to [facility] on Thursday.   Strengths: Independent prior level of function, Making steady progress towards goals, Manages pain appropriately, Motivated for  self care and independence, Pleasant and cooperative, Willingly participates in therapeutic activities  Barriers: Confused, Decreased endurance, Difficulty following instructions, Fatigue, Generalized weakness, Hemiplegia, Impaired activity tolerance, Impaired balance, Impulsive, Visual impairment    Plan    Anticipate DC to [place] on Thursday, Enter DC IRF [facility] tomorrow     Occupational Therapy Goals (Active)       Problem: Dressing       Dates: Start: 04/23/23         Goal: STG-Within one week, patient will dress LB with mod A overall using AE/DME as needed.       Dates: Start: 04/23/23               Problem: Grooming       Dates: Start: 04/23/23         Goal: STG-Within one week, patient will complete grooming with CGA for standing at sink using AE/DME as needed.       Dates: Start: 04/23/23               Problem: OT Long Term Goals       Dates: Start: 04/23/23         Goal: LTG-By discharge, patient will complete basic self care tasks with supervision-mod I overall using AE/DME as needed.       Dates: Start: 04/23/23            Goal: LTG-By discharge, patient will perform bathroom transfers with supervision-mod I overall using AE/DME as needed.       Dates: Start: 04/23/23               Problem: Toileting       Dates: Start: 04/23/23         Goal: STG-Within one week, patient will complete toileting tasks with mod A overall using AE/DME as needed.       Dates: Start: 04/23/23

## 2023-05-02 NOTE — DISCHARGE PLANNING
CM rec'd VM from patients son Devin to let CM know that family is currently working with [facility] ([facility]) and would like patient to DC there this Thursday.      SARAN asked attending to order a quanti-Ryan Gold.  SARAN spoke with Jay at [facility].  He stated they would be ready for patient on Thursday, Friday at the latest and could accept patient with a pending q-Gold result.  Jay asked SARAN if she could reach out to his director Kimberly at [phone] to see what additional info she will need for patients admission.  SARAN LM on Kimberly' VM.      SARAN called Devin back to update on above.  LM on his VM explaining a single point cane and home health will be ordered for patient for her DC.  CM will continue to monitor for DC needs.      5/3/23: CM rec'd TC from admissions at [facility], [facility] (Kimberly) requesting q-gold results. SARAN explained the test was just ordered yesterday as CM was made aware yesterday late morning that family wants patient to go to [facility].  Test result pending and CM explained to Kimberly, may not be in tomorrow, could be Friday.  Kimberly told CM patients son Devin would like [facility] to send referral to [facility].  Referral sent.  Kimberly stated there is no other p-work to complete.  SARAN let therapy scheduling know to keep patient on schedule for tomorrow just in case patient does not DC tomorrow.      16:45pm: SARAN spoke with patients son Devin to update on above.  He will be at [facility] tomorrow at 9 to be in [place] by 10:30am.  Patient to DC to [facility] as scheduled.  SARAN updated Charge RN and therapy scheduling, Dr. Ji.  CM available as needs arise.

## 2023-05-02 NOTE — THERAPY
Speech Language Pathology  Daily Treatment     Patient Name: Farhan Tanner  Age:  88 y.o., Sex:  female  Medical Record #: [id number]  Today's Date: 5/2/2023     Precautions  Precautions: Fall Risk  Comments: L neglect, L deepika, aspiration risk    Subjective    Pt seen at 0800 and 1304 for a total of 60 minutes. Pt pleasant and cooperative for ST, daughter present in afternoon session.      Objective       05/02/23 1304   Treatment Charges   SLP Swallowing Dysfunction Treatment Swallowing Dysfunction Treatment   SLP Cognitive Skill Development First 15 Minutes 1   SLP Cognitive Skill Development Additional 15 Minutes 1   SLP Total Time Spent   SLP Individual Total Time Spent (Mins) 60   Cognition   Visual Scanning / Cancellation Skills Minimal (4)   Dysphagia    Positioning / Behavior Modification Self Monitoring;Alternate Solids and Liquids;Other (see Comments)  (use of mirror)   Diet / Liquid Recommendation Thin (0);Regular (7)   Nutritional Liquid Intake Rating Scale Non thickened beverages   Nutritional Food Intake Rating Scale Total oral diet with no restrictions   Interdisciplinary Plan of Care Collaboration   IDT Collaboration with  Therapy Tech;Family / Caregiver   Patient Position at End of Therapy Seated;Call Light within Reach;Family / Friend in Room   Collaboration Comments pt's daughter attending 1300 session         Assessment    0800: Pt assessed with regular textures (chopped meats) and thin liquids. Pt initiated request for mirror IND. 2 instances of anterior spillage noted with solid textures, pt clearing independently. Pt continues to require set up A, assistance with opening containers and incorporating use of LUE during functional tasks. Recommend ongoing attendance in therapeutic dining setting and self feeding assistance. No overt s/sx of pen/asp, pt independently using swallow strategies.     1304: Selective visual attention task with emphasis on left scanning. Pt located 6/14 targets IND,  mod to MAX cues to locate remainder with use of visual guide on left margin as well as index card with arrow as guide for each word. Education provided to pt and pt's daughter on ongoing strategies to aid in left attention.     Strengths: Pleasant and cooperative, Supportive family, Willingly participates in therapeutic activities, Motivated for self care and independence, Independent prior level of function  Barriers: Impaired functional cognition, Impaired insight/denial of deficits, Impaired carryover of learning    Plan    D/c Dysphagia intervention, pt to remain in T-dine for self feeding. Continue to target visual scanning.     Passport items to be completed:  Express basic needs, understand food/liquid recommendations, consistently follow swallow precautions, manage finances, manage medications, arrive to therapy appointments on time, complete daily memory log entries, solve problems related to safety situations, review education related to hospitalization, complete caregiver training     Speech Therapy Problems (Active)       Problem: Memory STGs       Dates: Start: 04/23/23         Goal: STG-Within one week, patient will       Dates: Start: 04/23/23       Description: 1) Individualized goal:  recall novel information r/t hospitalization with external memory aids with 80% accuracy and MIN A.   2) Interventions:  SLP Self Care / ADL Training , SLP Cognitive Skill Development, and SLP Group Treatment          Goal Note filed on 04/25/23 1705 by Gayla Dill MS,CCC-SLP       Will continue to target.                  Problem: Problem Solving STGs       Dates: Start: 04/25/23         Goal: STG-Within one week, patient will complete visual scanning/cancellation tasks with adherence to left margin/visual field to achieve 80% accuracy provided MOD A       Dates: Start: 04/25/23            Goal: STG-Within one week, patient will perform single target visual scanning tasks with 100% with min-set up cuing and use of  compensatory strategies.       Dates: Start: 04/26/23               Problem: Speech/Swallowing LTGs       Dates: Start: 04/23/23         Goal: LTG-By discharge, patient will safely swallow       Dates: Start: 04/23/23       Description: 1) Individualized goal:  the least restrictive diet texture and thin liquids with no overt clinical s/sx of aspiration implementing safe swallow strategies with 90% accuracy and MOD I for a safe D/C home w/ family.   2) Interventions:  SLP Swallowing Dysfunction Treatment, SLP Video Swallow Evaluation, SLP Self Care / ADL Training , and SLP Group Treatment             Goal: LTG-By discharge, patient will solve complex problem       Dates: Start: 04/23/23       Description: 1) Individualized goal:  related to health and safety with 80% accuracy and MOD I for a safe D/C home with family.   2) Interventions:  SLP Self Care / ADL Training , SLP Cognitive Skill Development, and SLP Group Treatment                Problem: Swallowing STGs       Dates: Start: 04/23/23

## 2023-05-02 NOTE — PROGRESS NOTES
Assisted pt to [facility] bed for the night.  Both sons left the building.  Will continue to monitor patient.

## 2023-05-02 NOTE — PROGRESS NOTES
Received bedside shift report from Kanchan DOW RN regarding patient and assumed care. Patient awake, calm and stable, currently positioned in bed for comfort and safety; call light within reach. Denies pain or discomfort at this time. Will continue to monitor.

## 2023-05-03 ENCOUNTER — APPOINTMENT (OUTPATIENT)
Dept: SPEECH THERAPY | Facility: REHABILITATION | Age: 88
DRG: 056 | End: 2023-05-03
Attending: PHYSICAL MEDICINE & REHABILITATION
Payer: MEDICARE

## 2023-05-03 ENCOUNTER — APPOINTMENT (OUTPATIENT)
Dept: OCCUPATIONAL THERAPY | Facility: REHABILITATION | Age: 88
DRG: 056 | End: 2023-05-03
Attending: PHYSICAL MEDICINE & REHABILITATION
Payer: MEDICARE

## 2023-05-03 ENCOUNTER — APPOINTMENT (OUTPATIENT)
Dept: PHYSICAL THERAPY | Facility: REHABILITATION | Age: 88
DRG: 056 | End: 2023-05-03
Attending: PHYSICAL MEDICINE & REHABILITATION
Payer: MEDICARE

## 2023-05-03 PROBLEM — I61.9 INTRAPARENCHYMAL HEMORRHAGE OF BRAIN (HCC): Status: RESOLVED | Noted: 2023-04-19 | Resolved: 2023-05-03

## 2023-05-03 PROBLEM — J34.89 RHINORRHEA: Status: RESOLVED | Noted: 2023-05-03 | Resolved: 2023-05-03

## 2023-05-03 PROBLEM — I10 HYPERTENSION: Status: RESOLVED | Noted: 2023-04-22 | Resolved: 2023-05-03

## 2023-05-03 PROBLEM — J34.89 RHINORRHEA: Status: ACTIVE | Noted: 2023-05-03

## 2023-05-03 PROBLEM — G93.6 VASOGENIC BRAIN EDEMA (HCC): Status: RESOLVED | Noted: 2021-06-22 | Resolved: 2023-05-03

## 2023-05-03 PROCEDURE — 36415 COLL VENOUS BLD VENIPUNCTURE: CPT

## 2023-05-03 PROCEDURE — 770010 HCHG ROOM/CARE - REHAB SEMI PRIVAT*

## 2023-05-03 PROCEDURE — 97530 THERAPEUTIC ACTIVITIES: CPT | Mod: CO

## 2023-05-03 PROCEDURE — 99232 SBSQ HOSP IP/OBS MODERATE 35: CPT | Performed by: PHYSICAL MEDICINE & REHABILITATION

## 2023-05-03 PROCEDURE — A9270 NON-COVERED ITEM OR SERVICE: HCPCS | Performed by: PHYSICAL MEDICINE & REHABILITATION

## 2023-05-03 PROCEDURE — 92526 ORAL FUNCTION THERAPY: CPT

## 2023-05-03 PROCEDURE — 700102 HCHG RX REV CODE 250 W/ 637 OVERRIDE(OP): Performed by: PHYSICAL MEDICINE & REHABILITATION

## 2023-05-03 PROCEDURE — 97116 GAIT TRAINING THERAPY: CPT

## 2023-05-03 PROCEDURE — 97112 NEUROMUSCULAR REEDUCATION: CPT | Mod: CO

## 2023-05-03 PROCEDURE — 97530 THERAPEUTIC ACTIVITIES: CPT | Mod: CQ

## 2023-05-03 PROCEDURE — 86480 TB TEST CELL IMMUN MEASURE: CPT

## 2023-05-03 PROCEDURE — 97112 NEUROMUSCULAR REEDUCATION: CPT

## 2023-05-03 RX ORDER — AMOXICILLIN 250 MG
2 CAPSULE ORAL 2 TIMES DAILY
Qty: 120 TABLET | Refills: 0 | COMMUNITY
Start: 2023-05-03 | End: 2023-05-31

## 2023-05-03 RX ORDER — CETIRIZINE HYDROCHLORIDE 10 MG/1
10 TABLET ORAL DAILY
Qty: 30 TABLET | Refills: 0 | Status: SHIPPED | OUTPATIENT
Start: 2023-05-04 | End: 2023-05-31

## 2023-05-03 RX ADMIN — SENNOSIDES AND DOCUSATE SODIUM 2 TABLET: 50; 8.6 TABLET ORAL at 08:16

## 2023-05-03 RX ADMIN — CETIRIZINE HYDROCHLORIDE 10 MG: 10 TABLET, FILM COATED ORAL at 08:16

## 2023-05-03 RX ADMIN — OMEPRAZOLE 20 MG: 20 CAPSULE, DELAYED RELEASE ORAL at 08:16

## 2023-05-03 ASSESSMENT — PAIN DESCRIPTION - PAIN TYPE: TYPE: ACUTE PAIN

## 2023-05-03 ASSESSMENT — BRIEF INTERVIEW FOR MENTAL STATUS (BIMS)
ASKED TO RECALL BLUE: YES, NO CUE REQUIRED
INITIAL REPETITION OF BED BLUE SOCK - FIRST ATTEMPT: 3
WHAT MONTH IS IT: ACCURATE WITHIN 5 DAYS
WHAT YEAR IS IT: CORRECT
WHAT DAY OF THE WEEK IS IT: CORRECT
ASKED TO RECALL BED: YES, NO CUE REQUIRED
BIMS SUMMARY SCORE: 15
ASKED TO RECALL SOCK: YES, NO CUE REQUIRED

## 2023-05-03 ASSESSMENT — GAIT ASSESSMENTS
DISTANCE (FEET): 220
DEVIATION: DECREASED BASE OF SUPPORT;SHUFFLED GAIT;DECREASED HEEL STRIKE;DECREASED TOE OFF;BRADYKINETIC
GAIT LEVEL OF ASSIST: CONTACT GUARD ASSIST
DEVIATION: DECREASED BASE OF SUPPORT;SHUFFLED GAIT;DECREASED HEEL STRIKE;DECREASED TOE OFF
DISTANCE (FEET): 50
ASSISTIVE DEVICE: NONE;SINGLE POINT CANE

## 2023-05-03 ASSESSMENT — PATIENT HEALTH QUESTIONNAIRE - PHQ9
1. LITTLE INTEREST OR PLEASURE IN DOING THINGS: NOT AT ALL
2. FEELING DOWN, DEPRESSED, IRRITABLE, OR HOPELESS: NOT AT ALL
SUM OF ALL RESPONSES TO PHQ9 QUESTIONS 1 AND 2: 0

## 2023-05-03 ASSESSMENT — ACTIVITIES OF DAILY LIVING (ADL): BED_CHAIR_WHEELCHAIR_TRANSFER_DESCRIPTION: SUPERVISION FOR SAFETY;VERBAL CUEING;SET-UP OF EQUIPMENT

## 2023-05-03 NOTE — CARE PLAN
Problem: Grooming  Goal: STG-Within one week, patient will complete grooming with CGA for standing at sink using AE/DME as needed.  Outcome: Not Met     Problem: Dressing  Goal: STG-Within one week, patient will dress LB with mod A overall using AE/DME as needed.  Outcome: Not Met     Problem: Toileting  Goal: STG-Within one week, patient will complete toileting tasks with mod A overall using AE/DME as needed.  Outcome: Not Met      room air

## 2023-05-03 NOTE — DISCHARGE INSTRUCTIONS
[facility] NURSING DISCHARGE INSTRUCTIONS    Blood Pressure : 100/54  Weight: 63 kg (138 lb 14.2 oz)  Nursing recommendations for Farhan Tanner at time of discharge are as follows:  Client and Family Member verbalized understanding of all discharge instructions and prescriptions.     Review all your home medications and newly ordered medications with your doctor and/or pharmacist. Follow medication instructions as directed by your doctor and/or pharmacist.    Pain Management:   Discharge Pain Medication Instructions:  Comfort Goal: Sleep Comfortably, Comfort with Movement  Notify your primary care provider if pain is unrelieved with these measures, if the pain is new, or increased in intensity.    Discharge Skin Characteristics: Warm, Dry (skin tears to left arm)  Discharge Skin Exam: Other (Comments) (skin tears to left arm)    Skin / Wound Care Instructions: Please contact your primary care physician for any change in skin integrity.     If You Have Surgical Incisions / Wounds:  Monitor surgical site(s) for signs of increased swelling, redness or symptoms of drainage from the site or fever as this could indicate signs and symptoms of infection. If these symptoms are noted, notifiy your primary care provider.      Discharge Safety Instructions: Should Have ADULT SUPERVISION     Discharge Safety Concerns: Balance Problems (Dizziness, Light Headedness), Impaired Judgement, History Of Falls, Unsteady Gait, Weakness  The interdisciplinary team has made recommendation that you should have adult supervision in the house due to balance problem, wandering, impaired judgment, weakness, and unsteady gait  Anti-embolic stockings are not required to increase circulation to the lower extremities.    Discharge Diet: Regular     Discharge Liquids: Thin  Discharge Bowel Function: Incontinent  Please contact your primary care physician for any changes in bowel habits.  Discharge Bowel Program:   "  Discharge Bladder Function: Incontinent  Discharge Urinary Devices: Pad, Brief      Nursing Discharge Plan:        Case Management Discharge Instructions:   Discharge Location:    Agency Name/Address/Phone:    Home Health:    Outpatient Services:    DME Provider/Phone:    Medical Equipment Ordered:    Prescription Faxed to:        Discharge Medication Instructions:  Below are the medications your physician expects you to take upon discharge:    Visual Field Defect  The visual fields are the areas that are seen by each eye. The nerves that receive visual signals follow a complex path. These nerves go from the back of the eye to the very back of the brain (occipital lobe), which is the center for vision.  The nerves follow a complicated path on their way from the eyes to the brain and anything affecting the eyes themselves, the optic nerves, the crossover point (optic chiasm), the optic tracks, or the back of the brain can cause a part of the visual fields to disappear. This is called a visual field defect.  By carefully testing the visual fields for areas of vision that are missing, it is often possible to tell where the problem lies along this complicated pathway.   CAUSES   There are a great many diseases and disorders that can cause visual field defects because any problem at any point along the complicated path to the back of the brain can be at fault. There are too many causes of visual defects to list, but here are some just some of the types of problems that cause visual field defects:  Diseases of the Eyes  Glaucoma.  Diseases or defect of:  The cornea.  The lens (cataract).  The fluid inside the eye (the vitreous).  These visual defects usually \"float\" or seem to move.  The retina.  The optic nerve.  Diseases of the Optic Chiasm  Tumors of, or near the optic chiasm.  Tumors and diseases of the pituitary gland.  Hemorrhages, aneurysms, or other disorders of the blood vessels near the optic chiasm.  Diseases " "of the Optic Tracks  Hemorrhages (stroke), aneurysms, or other disorders of the blood vessels within the brain.  Brain tumor.  Neurological diseases of the brain (e.g. multiple sclerosis).  Increased pressure inside the head.  Diseases of the Brain  Disorders of development  Amblyopia (poor vision in one eye due to underdevelopment of the brain's visual center in childhood).  Hemorrhages (stroke), aneurysms, or other disorders of the blood vessels within the brain.  Brain tumor.  Denis infections.  Neurological diseases of the brain (e.g. multiple sclerosis).  Increased pressure inside the head.  Drugs (e.g. digitalis poisoning).  Poisoning (e.g. lead poisoning, methanol poisoning).  Diseases of the body or other organs (e.g. pernicious anemia, drug toxicity).  SYMPTOMS   It is important for the doctor to know if visual field defects are present in both eyes or just one eye. The defect can be subtle. Often a patient does not know that a small portion of vision is missing. When a visual defect is present in just one eye, the disease or problem is almost always in the eye or the optic nerve on that side. If a defect is found to be present in both eyes, it is important that testing is carried out. This is because there may be a problem within the brain behind the point where the optic nerves meet and cross (optic chiasm).  DIAGNOSIS   Visual field testing is done in the doctors office and is easy and painless. With one eye patched, the patient is asked to look at the center of a screen or large bowl shaped machine. The patient is told to press a button whenever he or she sees a small light with their side vision. By doing this, the visual field in each eye is \"mapped\" looking for areas of lost or diminished vision. Everyone has a normal \"blind spot\" where the optic nerve leaves the eye, but even the size and shape of the blind spot is mapped to look for abnormalities.  TREATMENT   Treatment depends on the cause of the " visual defect(s). If the problem is not coming from the eye(s), more testing may be needed to find the cause of the problem. Additional testing includes:  X-rays of the head.  CT scanning.  injecting a dye into the bloodstream with X-rays of the head (angiography).  Other investigations.  Document Released: 09/26/2009 Document Revised: 03/11/2013 Document Reviewed: 09/26/2009  ExitCare® Patient Information ©2014 [employer].    Intracranial Hemorrhage  An intracranial hemorrhage is bleeding in the layers between the skull (cranium) and brain. A blood vessel bursts and allows blood to leak inside the cranial cavity. The leaking blood then collects (hematoma). This causes pressure and damage to brain cells. The bleeding can be mild to severe. In severe cases, it can lead to permanent damage or death. Symptoms may come on suddenly or develop over time. Early diagnosis and treatment leads to better recovery.  There are four types of intracranial hemorrhage: subarachnoid, subdural, extradural, or cerebral hemorrhage.  What are the causes?  Head injury (trauma).  Ruptured brain aneurysm.  Bleeding from blood vessels that develop abnormally (arteriovenous malformation).  Bleeding disorder.  Use of blood thinners (anticoagulants).  Use of certain drugs, such as cocaine.  For some people with intracranial hemorrhage, the cause is unknown.  What increases the risk?  Using tobacco products, such as cigarettes and chewing tobacco.  Having high blood pressure (hypertension).  Abusing alcohol.  Being a female, especially of postmenopausal age.  Having a family history of disease in the blood vessels of the brain (cerebrovascular disease).  Having certain genetic syndromes that result in kidney disease or connective tissue disease.  What are the signs or symptoms?  A sudden, severe headache with no known cause. The headache is often described as the worst headache ever experienced.  Nausea or vomiting, especially when combined  with other symptoms such as a headache.  Sudden weakness or numbness of the face, arm, or leg, especially on one side of the body.  Sudden trouble walking or difficulty moving arms or legs.  Sudden confusion.  Sudden personality changes.  Trouble speaking (aphasia) or understanding.  Difficulty swallowing.  Sudden trouble seeing in one or both eyes.  Double vision.  Dizziness.  Loss of balance or coordination.  Intolerance to light.  Stiff neck.  How is this diagnosed?  Your health care provider will perform a physical exam and ask about your symptoms. If an intracranial hemorrhage is suspected, various tests may be ordered. These tests may include:  A CT scan.  An MRI.  A cerebral angiogram.  A spinal tap (lumbar puncture).  Blood tests.  How is this treated?  Immediate treatment in the hospital is often required to reduce the risk of brain damage. Treatment will depend on the cause of the bleeding, where it is located, and the extent of the bleeding and damage. The goals of treatment include stopping the bleeding, repairing the cause of bleeding, providing relief of symptoms, and preventing problems.  Medicines may be given to:  Lower blood pressure (antihypertensives).  Relieve pain (analgesics).  Relieve nausea or vomiting.  Surgery may be needed to stop the bleeding, repair the cause of the bleeding, or remove the blood.  Rehabilitation may be needed to improve any cognitive and day-to-day functions impaired by the condition.  Further treatment depends on the duration, severity, and cause of your symptoms. Physical, speech, and occupational therapists will assess you and work to improve any functions impaired by the intracranial hemorrhage. Measures will be taken to prevent short-term and long-term problems, including infection from breathing foreign material into the lungs (aspiration pneumonia), blood clots in the legs, bedsores, and falls.  Follow these instructions at home:  Take medicines only as directed  by your health care provider.  Eat healthy foods as directed by your health care provider:  A diet low in salt (sodium), saturated fat, trans fat, and cholesterol may be recommended to manage your blood pressure.  Foods may need to be soft or pureed, or small bites may need to be taken in order to avoid aspirating or choking.  If studies show that your ability to swallow safely has been affected, you may need to seek help from specialists such as a dietitian, speech and language pathologist, or an occupational therapist. These health care providers can teach you how to safely get the nutrition your body needs.  Rest and limit activities or movements as directed by your health care provider.  Do not use any tobacco products including cigarettes, chewing tobacco, or electronic cigarettes. If you need help quitting, ask your health care provider.  Limit alcohol intake to no more than 1 drink per day for nonpregnant women and 2 drinks per day for men. One drink equals 12 ounces of beer, 5 ounces of wine, or 1½ ounces of hard liquor.  Make any other lifestyle changes as directed by your health care provider.  Monitor and record your blood pressure as directed by your health care provider.  A safe home environment is important to reduce the risk of falls. Your health care provider may arrange for specialists to evaluate your home. Having grab bars in the bedroom and bathroom is often important. Your health care provider may arrange for special equipment to be used at home, such as raised toilets and a seat for the shower.  Do physical, occupational, and speech therapy as directed by your health care provider. Ongoing therapy may be needed to maximize your recovery.  Use a walker or a cane at all times if directed by your health care provider.  Keep all follow-up visits with your health care provider and other specialists. This is important. This includes any referrals, physical therapy, and rehabilitation.  Get help right  away if:  You have a sudden, severe headache with no known cause.  You have nausea or vomiting occurring with another symptom.  You have sudden weakness or numbness of the face, arm, or leg, especially on one side of the body.  You have sudden trouble walking or difficulty moving your arms or legs.  You have sudden confusion.  You have trouble speaking (aphasia) or understanding.  You have sudden trouble seeing in one or both eyes.  You have a sudden loss of balance or coordination.  You have a stiff neck.  You have difficulty breathing.  You have a partial or total loss of consciousness.  These symptoms may represent a serious problem that is an emergency. Do not wait to see if the symptoms will go away. Get medical help right away. Call your local emergency services (911 in the [place] Do not drive yourself to the hospital.   This information is not intended to replace advice given to you by your health care provider. Make sure you discuss any questions you have with your health care provider.  Document Released: 07/15/2015 Document Revised: 05/19/2017 Document Reviewed: 02/11/2015  [employer] Interactive Patient Education © 2017 [employer]    Understanding Your Risk for Falls  Each year, millions of people suffer serious injuries from falls. It is important to understand your risk for falling. Talk with your health care provider about your risk and what you can do to lower it. There are actions you can take at home to lower your risk.  If you do have a serious fall, it is important to tell your health care provider. Falling once raises your risk for falling again.  How can falls affect me?  Serious injuries from falls are common. These include:  Broken bones. Most hip fractures are caused by falls.  Traumatic brain injury (TBI). Falls are the most common cause of TBI.  Fear of falling can also cause you to avoid activities and stay at home. This can make your muscles weaker and actually raise your risk for a  fall.  What can increase my risk?  Serious injuries from a fall most often happen to people older than age 65. Children and young adults ages 15-29 are also at higher risk. The more risk factors you have for falling, the higher your risk. Risk factors include:  Weakness in the lower body.  Lack (deficiency) of vitamin D.  Weak bones (osteoporosis).  Being generally weak or confused due to long-term (chronic) illness.  Dizziness or balance problems.  Poor vision.  Having depression.  Medicine that causes dizziness or drowsiness. These can include medicines for your blood pressure, heart, anxiety, insomnia, or edema, as well as pain medicines and muscle relaxants.  Drinking alcohol.  Foot pain or improper footwear.  Working at a dangerous job.  Having had a fall in the past.  Tripping hazards at home, such as floor clutter or loose rugs, or poor lighting.  Having pets or clutter in your home.  What actions can I take to lower my risk of falling?         Maintain physical fitness:  Do strength and balance exercises. Consider taking a regular class to build strength and balance. Yoga and brody chi are good options.  Have your eyes checked every year and your vision prescription updated as needed.  Remove all clutter from walkways and stairways, including extension cords.  Use a cordless phone.  Do not use throw rugs. Make sure all carpeting is taped or tacked down securely.  Use good lighting in all rooms. Keep a flashlight near your bed.  Make sure there is a clear path from your bed to the bathroom. Use night-lights.  Install grab bars for your tub, shower, and toilet. Use a bath mat in your tub or shower.  Attach secure railings on both sides of your stairs.  Repair uneven or broken steps.  Use a cane or walker as directed by your health care provider.  Wear nonskid shoes. Do not wear high heels. Do not walk around the house in socks or slippers.  Avoid walking on icy or slippery surfaces. Walk on the grass instead of  on icy or slick sidewalks. Where you can, use ice melt to get rid of ice on walkways.  Questions to ask your health care provider  Can you help me evaluate my risk for a fall?  Do any of my medicines make me more likely to fall?  Should I take a vitamin D supplement?  What exercises can I do to improve my strength and balance?  Should I make an appointment to have my vision checked?  Do I need a bone density test to check for osteoporosis?  Would it help to use a cane or a walker?  Where to find more information  Centers for Disease Control and Prevention, [place]: cdc.gov  Community-Based Fall Prevention Programs: cdc.gov  National [place] on Aging: fk3rrgi.dale.nih.gov  Contact a health care provider if:  You fall at home.  You are afraid of falling at home.  You feel weak, drowsy, or dizzy at home.  Summary  People 65 and older are at high risk for falling. However, older people are not the only ones injured in falls. Children and young adults have a higher-than-normal risk, too.  Talk with your health care provider about your risks for falling and how to lower those risks.  Taking certain precautions at home can lower your risk for falling.  If you fall, always tell your health care provider.  This information is not intended to replace advice given to you by your health care provider. Make sure you discuss any questions you have with your health care provider.  Document Released: 08/01/2018 Document Revised: 03/19/2019 Document Reviewed: 08/01/2018  [employer] Patient Education © 2020 [employer].    Depression / Suicide Risk    As you are discharged from this [facility] facility, it is important to learn how to keep safe from harming yourself.    Recognize the warning signs:  Abrupt changes in personality, positive or negative- including increase in energy   Giving away possessions  Change in eating patterns- significant weight changes-  positive or negative  Change in sleeping patterns- unable to sleep or  sleeping all the time   Unwillingness or inability to communicate  Depression  Unusual sadness, discouragement and loneliness  Talk of wanting to die  Neglect of personal appearance   Rebelliousness- reckless behavior  Withdrawal from people/activities they love  Confusion- inability to concentrate     If you or a loved one observes any of these behaviors or has concerns about self-harm, here's what you can do:  Talk about it- your feelings and reasons for harming yourself  Remove any means that you might use to hurt yourself (examples: pills, rope, extension cords, firearm)  Get professional help from the community (Mental Health, Substance Abuse, psychological counseling)  Do not be alone:Call your Safe Contact- someone whom you trust who will be there for you.  Call your local CRISIS HOTLINE 246-9396 or 547-342-9840  Call your local Children's Mobile Crisis Response Team [place] (971) 570-7315 or www.EUDOWEB  Call the toll free National Suicide Prevention Hotlines   National Suicide Prevention Lifeline 635-267-ISMA (8124)  [place] [facility] Line Network 800-SUICIDE (541-2724)      Physical Therapy Recommendations:    Please ensure that caregivers are assisting on the left side of Farhan when possible. She is able to walk with a cane or no assistive device as long as a caregiver has hands on to assist for balance. Please encourage her to attend to the left side of the world and use her left hand when possible. Please provide 24hr supervision for all standing mobility until cleared by home health to reduce the level of assistance.    Keep up the hard work! - Alex PT    Speech Therapy Discharge Instructions for Farhan Tanner    5/4/2023    Diet: Thin (0), Regular - Easy to Chew (7)  Swallow Strategies: small bites/sips, use finger or tongue to sweep your lower left cheek and clear any food residue. Use of a mirror to monitor spillage from left side of mouth and clear residue will be helpful at  meal times.  Supervision: 24 hour supervision is recommended for safety at this time.  Cognition / Communication: It has been a pleasure working with you, Federico! Please keep up the great work at home and best of luck in your continued recovery! ~ Gayla Dill MS, CCC-SLP

## 2023-05-03 NOTE — PROGRESS NOTES
"Rehab Progress Note     Date of Service: 5/3/2023  Chief Complaint: Follow-up hemorrhagic stroke    Interval Events (Subjective)    Patient seen and examined today in the hallway.  She is resting outside the nursing station and falling asleep.  She continues to have difficulty oriented to the left side.  She understands plan to discharge tomorrow to assisted living facility.  She is currently denying any pain.    Patient has no other new questions, concerns, or complaints today.         Objective:  VITAL SIGNS: /71   Pulse 64   Temp 36.8 °C (98.2 °F) (Axillary)   Resp 16   Ht 1.6 m (5' 3\")   Wt 63 kg (138 lb 14.2 oz)   SpO2 95%   BMI 24.60 kg/m²   Gen: alert, no apparent distress  CV: Regular rate, regular rhythm  Resp: Clear to auscultation bilaterally  Neuro: Severe left neglect    No results found for this or any previous visit (from the past 72 hour(s)).      Scheduled Medications   Medication Dose Frequency    cetirizine  10 mg DAILY    omeprazole  20 mg DAILY    senna-docusate  2 Tablet BID       Current Diet Order   Procedures    Diet Order Diet: Regular (meds whole 1:1 floated, no straws. Chop meats)       Assessment:    This patient is a 88 y.o. female admitted for acute inpatient rehabilitation with Intraparenchymal hemorrhage of brain (HCC).    I led and attended the weekly conference, and agree with the IDT conference documentation and plan of care as noted below.    Date of conference: 5/3/2023    Goals and barriers: See IDT note.    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  Left neglect, continues  Left visual field cut, continues  Functional left hemiparesis, continues  Dysphagia, resolved  Nondominant  Moderate cognitive impairment, improved  Continue full rehab program  PT/OT/SLP, 1 hr each discipline, 5 days per week    Outpatient follow-up with [facility] and , referrals made    Hyperlipidemia    No evidence for older patients that statins prevent heart disease, strokes, or death    Encephalopathy, resolved  Enclosure bed discontinued    Rhinorrhea, improved  Continue cetirizine    GI prophylaxis  Continue omeprazole as an inpatient    Abnormal thyroid studies  TSH mildly elevated at 5.7, normal T4  Outpatient follow up with PCP for repeat studies in 4-6 weeks    Bowels  Bowel incontinence, intermittent  Continue Senokot  Last bowel movement 5/1    Bladder  With mild retention, resolved  Continue to check PVRs -98, 101  IC for over 400 CC - not requiring    DVT prophylaxis  Contraindicated given hemorrhage.   Compression stockings on in am, off in pm.  Increase mobility. Monitor for swelling.  Patient currently walking 250 feet.      Christine Ji M.D.  Physical Medicine and Rehabilitation

## 2023-05-03 NOTE — THERAPY
"Occupational Therapy  Daily Treatment     Patient Name: Farhan Tanner  Age:  88 y.o., Sex:  female  Medical Record #: [id number]  Today's Date: 5/3/2023     Precautions  Precautions: (P) Fall Risk  Comments: (P) severe left neglect  left hemiparesis    Safety   Comments: See functional levels of assist for ADL performance details    Subjective     Agreeable to tx activity presented this session      Objective/Assessment       05/03/23 1031   OT Charge Group   OT Neuromuscular Re-education / Balance (Units) 2   OT Therapy Activity (Units) 2   OT Total Time Spent   OT Individual Total Time Spent (Mins) 60   Precautions   Precautions Fall Risk   Comments severe left neglect  left hemiparesis   Neuro-Muscular Treatments   Neuro-Muscular Treatments Biofeedback;Postural Changes;Postural Facilitation;Verbal Cuing;Tactile Cuing;Weight Shift Left   Comments seated edge of mat  weight bearing through leftUE while reaching with the right    prlonged  right side lean  followed by  using mirror for biofeedback  finding  midline  unable to maintain    performed 2 times through out session       UE bike  x 5 minutes  unable to maintain grasp with right UE   grasp maintained with  ace wrap.    seated at table  worked on  reaching with left and right UEs    for \" koosh\" balls   various locations on both sides   dropping into  container placed on left side on floor   she would begin to fatigue and loose focus after 4 reaches.  more than 4 reaches   resulted in very poor direction following and  ability .   Interdisciplinary Plan of Care Collaboration   IDT Collaboration with  Family / Caregiver   Patient Position at End of Therapy Seated;Chair Alarm On;Wrist Restraints Applied;Self Releasing Lap Belt Applied   Collaboration Comments son observerved part of tx activity     Cues to clear through several times during session    BIMs completed this session               Strengths: Independent prior level of function, Making steady " progress towards goals, Manages pain appropriately, Motivated for self care and independence, Pleasant and cooperative, Willingly participates in therapeutic activities  Barriers: Confused, Decreased endurance, Difficulty following instructions, Fatigue, Generalized weakness, Hemiplegia, Impaired activity tolerance, Impaired balance, Impulsive, Visual impairment    Plan   Ready to transition to  group home with HH OT to follow       Occupational Therapy Goals (Active)       Problem: Dressing       Dates: Start: 04/23/23         Goal: STG-Within one week, patient will dress LB with mod A overall using AE/DME as needed.       Dates: Start: 04/23/23               Problem: Grooming       Dates: Start: 04/23/23         Goal: STG-Within one week, patient will complete grooming with CGA for standing at sink using AE/DME as needed.       Dates: Start: 04/23/23               Problem: OT Long Term Goals       Dates: Start: 04/23/23         Goal: LTG-By discharge, patient will complete basic self care tasks with supervision-mod I overall using AE/DME as needed.       Dates: Start: 04/23/23            Goal: LTG-By discharge, patient will perform bathroom transfers with supervision-mod I overall using AE/DME as needed.       Dates: Start: 04/23/23               Problem: Toileting       Dates: Start: 04/23/23         Goal: STG-Within one week, patient will complete toileting tasks with mod A overall using AE/DME as needed.       Dates: Start: 04/23/23

## 2023-05-03 NOTE — CARE PLAN
Problem: Mobility  Goal: STG-Within one week, patient will ambulate household distance 150 ft with no AD vs LRAD and CGA.  Outcome: Progressing   Pt requires occasional Noemy to prevent LOB  Problem: Mobility  Goal: STG-Within one week, patient will ascend and descend 2-3 stairs with CGA and BHR prn.  Outcome: Met

## 2023-05-03 NOTE — PROGRESS NOTES
Received bedside shift report from Ivonne CAMACHO RN regarding patient and assumed care. Patient awake, calm and stable, currently positioned in bed for comfort and safety; call light within reach. Denies pain or discomfort at this time. Will continue to monitor.

## 2023-05-03 NOTE — CARE PLAN
"    The patient is Stable - Low risk of patient condition declining or worsening    Shift Goals  Clinical Goals: Safety  Patient Goals: Sleep well  Family Goals: support      Patient is not progressing towards the following goals:      Problem: Knowledge Deficit - Standard  Goal: Patient and family/care givers will demonstrate understanding of plan of care, disease process/condition, diagnostic tests and medications  5/2/2023 0324 by Angelito Bautista, L.P.N.  Outcome: Not Met  Note: Pt agrees with plan of care tonight regarding medications and safety, needs cueing and reminders.  Will continue to monitor patient.        Problem: Fall Risk - Rehab  Goal: Patient will remain free from falls  5/2/2023 0324 by Angelito Bautista, L.P.N.  Outcome: Not Met  Note: Siria Concepcion Fall risk Assessment Score:  24    High fall risk Interventions   - Alarming seatbelt  - Wander guard  - Bed and strip alarm   - Yellow sign by the door   - Yellow wrist band \"Fall risk\"  - Room near to the nurse station  - Do not leave patient unattended in the bathroom  - Fall risk education provided  -[facility] Bed               "

## 2023-05-03 NOTE — THERAPY
"Physical Therapy   Daily Treatment     Patient Name: Farhan Tanner  Age:  88 y.o., Sex:  female  Medical Record #: [id number]  Today's Date: 5/3/2023     Precautions  Precautions: Fall Risk  Comments: severe left neglect  left hemiparesis    Subjective    \"Oh there's more?\" Pt resting in bed at arrival, agreeable to PT tx.      Objective       05/03/23 1401   PT Charge Group   PT Gait Training (Units) 1   PT Neuromuscular Re-Education / Balance (Units) 1   PT Total Time Spent   PT Individual Total Time Spent (Mins) 30   Gait Functional Level of Assist    Assistive Device None;Single Point Cane   Distance (Feet) 50   # of Times Distance was Traveled 3   Deviation Decreased Base Of Support;Shuffled Gait;Decreased Heel Strike;Decreased Toe Off   Bed Mobility    Supine to Sit Supervised   Neuro-Muscular Treatments   Neuro-Muscular Treatments Compensatory Strategies;Postural Facilitation;Tactile Cuing;Verbal Cuing  (dual task to facilitate L scanning/L sided awareness)   Comments standing scanning activity finding objects of specific colors and placing in bucket to L side, then gait + scanning field of targets to retrieve specific objects from floor c/ reacher, then gait with bimanual tray carrying with cup of water and 2 objects for L side attention facilitation 1 x 50'   Interdisciplinary Plan of Care Collaboration   IDT Collaboration with  Physical Therapist;Family / Caregiver   Patient Position at End of Therapy Seated;Chair Alarm On;Self Releasing Lap Belt Applied;Call Light within Reach;Tray Table within Reach;Phone within Reach;Family / Friend in Room   Collaboration Comments d/c needs, pt/family education to facilitate L side awareness and LUE use during daily tasks/activities     Focus on L side awareness via scanning, reaching, and bimanual carrying tasks as above.     Assessment    Pt with L side neglect extending to peripersonal space. Addition of bimanual dual task (carrying tray) resulted in increased " postural control, step length, and L side awareness compared to walking c/ SPC and VC at beginning of session. Reviewed strategies with family members to promote L side attention/LUE use during ADLs and rationale for salience of activities to promote neuroplasticity.     Strengths: Able to follow instructions, Effective communication skills, Independent prior level of function, Pleasant and cooperative, Willingly participates in therapeutic activities  Barriers: Confused, Decreased endurance, Fatigue, Hemiparesis, Home accessibility, Impaired activity tolerance, Impaired balance, Impaired carryover of learning, Impaired insight/denial of deficits, Impaired functional cognition, Impulsive, Limited mobility, Visual impairment    Plan    Gait training SPC scanning L and path-finding, L hemibody NRE, dual task, dynamic standing balance with forced use L UE and crossing midline, prepare for DC     Passport items to be completed:  Get in/out of bed safely, in/out of a vehicle, safely use mobility device, walk or wheel around home/community, navigate up and down stairs, show how to get up/down from the ground, ensure home is accessible, demonstrate HEP, complete caregiver training       Physical Therapy Problems (Active)       Problem: Mobility       Dates: Start: 04/23/23         Goal: STG-Within one week, patient will ambulate household distance 150 ft with no AD vs LRAD and CGA.       Dates: Start: 04/23/23               Problem: PT-Long Term Goals       Dates: Start: 04/23/23         Goal: LTG-By discharge, patient will ambulate 200 ft x3 indoors/ outdoors with no AD vs LRAD and SPV.       Dates: Start: 04/23/23            Goal: LTG-By discharge, patient will transfer one surface to another with no AD vs LRAD and SPV.       Dates: Start: 04/23/23            Goal: LTG-By discharge, patient will ambulate up/down 2-3 stairs with SPV, no AD vs LRAD, and no HR to simulate home entrance.       Dates: Start: 04/23/23             Goal: LTG-By discharge, patient will transfer in/out of a car with no AD vs LRAD and SPV after set up.       Dates: Start: 04/23/23

## 2023-05-03 NOTE — CARE PLAN
Problem: Mobility  Goal: Patient's capacity to carry out activities will improve  Outcome: Progressing  Note: Patient able to perform regular activities this shift.  Pain controlled this shift.  Pain management includes PRN pain meds as well as non-pharmacological measures such as emotional support, rest, and repositioning.  Will continue to monitor.    The patient is Stable - Low risk of patient condition declining or worsening    Shift Goals  Clinical Goals: Safety  Patient Goals: Sleep well  Family Goals: support    Progress made toward(s) clinical / shift goals:  pt participates with therapy and is cooperative with nursing    Patient is not progressing towards the following goals:

## 2023-05-03 NOTE — DISCHARGE PLANNING
Case management  Reviewed signed copy of IMM and answered all questions.    Dc date /disposition: 5/4/23 to assisted living and home health.

## 2023-05-03 NOTE — THERAPY
"Physical Therapy   Daily Treatment     Patient Name: Farhan Tanner  Age:  88 y.o., Sex:  female  Medical Record #: [id number]  Today's Date: 5/3/2023     Precautions  Precautions: Fall Risk  Comments: L neglect, L deepika, aspiration risk    Subjective    Pt agreeable to therapy, she was received in t-dine as she was completing her breakfast     Objective       05/03/23 0831   PT Charge Group   PT Gait Training (Units) 2   PT Therapeutic Activities (Units) 2   Supervising Physical Therapist Alex Landon   PT Total Time Spent   PT Individual Total Time Spent (Mins) 60   Gait Functional Level of Assist    Gait Level Of Assist Contact Guard Assist  (CGA/Noemy)   Assistive Device None   Distance (Feet) 220  (plus 125' x 1 with caregiver providing HHA/Noemy with gait belt)   # of Times Distance was Traveled 1   Deviation Decreased Base Of Support;Shuffled Gait;Decreased Heel Strike;Decreased Toe Off;Bradykinetic   Wheelchair Functional Level of Assist   Wheelchair Assist Minimal Assist  (initially SBA using B LE and R UE, ultimately requires physical assistance for wc mobility)   Distance Wheelchair (Feet or Distance) 50   Wheelchair Description Leg rest management;Extra time;Impaired coordination;Supervision for safety;Verbal cueing;Safety concerns;Requires incidental assist   Stairs Functional Level of Assist   Level of Assist with Stairs Contact Guard Assist   # of Stairs Climbed 14  (8 6\" and 6 4\" stairs L HR)   Stairs Description Extra time;Hand rails;Supervision for safety;Safety concerns;Requires incidental assist;Verbal cueing  (reciprocal pattern ascending step to descending, vc for sequencing)   Transfer Functional Level of Assist   Bed, Chair, Wheelchair Transfer Contact Guard Assist   Bed Chair Wheelchair Transfer Description Supervision for safety;Verbal cueing;Set-up of equipment  (stand step transfer HHA, no AD)   Bed Mobility    Supine to Sit Standby Assist   Sit to Supine Standby Assist   Sit to Stand " Standby Assist   Scooting Standby Assist   Rolling Supervised   Interdisciplinary Plan of Care Collaboration   IDT Collaboration with  Physical Therapist;Family / Caregiver;Certified Nursing Assistant   Patient Position at End of Therapy Seated;Chair Alarm On;Self Releasing Lap Belt Applied;Other (Comments)  (handed off to CNA after FT)   Collaboration Comments POC, completed hands on gait with pt's son Anastasia       Caregiver training completed with pt's son anastasia, he was instructed on amb on indoor level surfaces with the patient, no AD, using gait belt and HHA method while standing on pt's L side. Edu provided on necessity of pt having supervision at all times, proper foot wear for improved balance and pt's L hemiparesis and neglect    Assessment    Completed collection of IRF ABHIJIT data, see flow sheet. The patient has made significant progress toward improved functional mobility and will continue to require direct supervision/CGA for safety with all mobility upon dc. Primarily limited by L neglect and impaired balance. Pt son demos ability to safely amb with the patient and he provided appropriate verbal cuing for the patient during training.   Strengths: Able to follow instructions, Effective communication skills, Independent prior level of function, Pleasant and cooperative, Willingly participates in therapeutic activities  Barriers: Confused, Decreased endurance, Fatigue, Hemiparesis, Home accessibility, Impaired activity tolerance, Impaired balance, Impaired carryover of learning, Impaired insight/denial of deficits, Impaired functional cognition, Impulsive, Limited mobility, Visual impairment    Plan    Gait training SPC scanning L and path-finding, L hemibody NRE, dual task, dynamic standing balance with forced use L UE and crossing midline, prepare for DC        Passport items to be completed:  Get in/out of bed safely, in/out of a vehicle, safely use mobility device, walk or wheel around home/community,  navigate up and down stairs, show how to get up/down from the ground, ensure home is accessible, demonstrate HEP, complete caregiver training    Physical Therapy Problems (Active)       Problem: Mobility       Dates: Start: 04/23/23         Goal: STG-Within one week, patient will ambulate household distance 150 ft with no AD vs LRAD and CGA.       Dates: Start: 04/23/23            Goal: STG-Within one week, patient will ascend and descend 2-3 stairs with CGA and BHR prn.       Dates: Start: 04/23/23               Problem: PT-Long Term Goals       Dates: Start: 04/23/23         Goal: LTG-By discharge, patient will ambulate 200 ft x3 indoors/ outdoors with no AD vs LRAD and SPV.       Dates: Start: 04/23/23            Goal: LTG-By discharge, patient will transfer one surface to another with no AD vs LRAD and SPV.       Dates: Start: 04/23/23            Goal: LTG-By discharge, patient will ambulate up/down 2-3 stairs with SPV, no AD vs LRAD, and no HR to simulate home entrance.       Dates: Start: 04/23/23            Goal: LTG-By discharge, patient will transfer in/out of a car with no AD vs LRAD and SPV after set up.       Dates: Start: 04/23/23

## 2023-05-03 NOTE — THERAPY
Speech Language Pathology  Daily Treatment     Patient Name: Farhan Tanner  Age:  88 y.o., Sex:  female  Medical Record #: [id number]  Today's Date: 5/3/2023     Precautions  Precautions: Fall Risk  Comments: severe left neglect  left hemiparesis    Subjective    Patient pleasant and agreeable to therapy.      Objective       05/03/23 0801   Treatment Charges   SLP Swallowing Dysfunction Treatment Swallowing Dysfunction Treatment   SLP Total Time Spent   SLP Individual Total Time Spent (Mins) 30   Cosignature   Documentation Review Approved   Dysphagia    Positioning / Behavior Modification Alternate Solids and Liquids;Modulate Rate or Bite Size;Self Monitoring  (lingual/finger sweep)   Interdisciplinary Plan of Care Collaboration   IDT Collaboration with  Therapy Tech   Patient Position at End of Therapy Seated;Chair Alarm On;Self Releasing Lap Belt Applied;Other (Comments)   Collaboration Comments Tech present in T-dine         Assessment    Patient seen with breakfast this AM in T-dine. Patient given tray set up assistance. Mirror placed in front of her to monitor herself while eating. Patient observed to implement her swallowing strategies independently. Patient cleared pocketing of her left buccal cavity using either finger or lingual sweep every couple of bites. Taking small bites/sips and alternating her liquids and solids. No overt s/sx of aspiration noted.     Strengths: Motivated for self care and independence, Pleasant and cooperative, Supportive family, Willingly participates in therapeutic activities, Making steady progress towards goals  Barriers: Impaired functional cognition, Impaired carryover of learning (left neglect)    Plan    D/C for T-dine for swallowing, but continue for set up and self feeding.      Passport items to be completed:  Express basic needs, understand food/liquid recommendations, consistently follow swallow precautions, manage finances, manage medications, arrive to therapy  appointments on time, complete daily memory log entries, solve problems related to safety situations, review education related to hospitalization, complete caregiver training     Speech Therapy Problems (Active)       Problem: Memory STGs       Dates: Start: 04/23/23         Goal: STG-Within one week, patient will       Dates: Start: 04/23/23       Description: 1) Individualized goal:  recall novel information r/t hospitalization with external memory aids with 80% accuracy and MIN A.   2) Interventions:  SLP Self Care / ADL Training , SLP Cognitive Skill Development, and SLP Group Treatment          Goal Note filed on 04/25/23 1705 by Gayla Dill MS,CCC-SLP       Will continue to target.                  Problem: Speech/Swallowing LTGs       Dates: Start: 04/23/23         Goal: LTG-By discharge, patient will safely swallow       Dates: Start: 04/23/23       Description: 1) Individualized goal:  the least restrictive diet texture and thin liquids with no overt clinical s/sx of aspiration implementing safe swallow strategies with 90% accuracy and MOD I for a safe D/C home w/ family.   2) Interventions:  SLP Swallowing Dysfunction Treatment, SLP Video Swallow Evaluation, SLP Self Care / ADL Training , and SLP Group Treatment             Goal: LTG-By discharge, patient will solve complex problem       Dates: Start: 04/23/23       Description: 1) Individualized goal:  related to health and safety with 80% accuracy and MOD I for a safe D/C home with family.   2) Interventions:  SLP Self Care / ADL Training , SLP Cognitive Skill Development, and SLP Group Treatment                Problem: Swallowing STGs       Dates: Start: 04/23/23

## 2023-05-03 NOTE — CARE PLAN
Problem: Memory STGs  Goal: STG-Within one week, patient will  Description: 1) Individualized goal:  recall novel information r/t hospitalization with external memory aids with 80% accuracy and MIN A.   2) Interventions:  SLP Self Care / ADL Training , SLP Cognitive Skill Development, and SLP Group Treatment      Outcome: Not Met     Problem: Problem Solving STGs  Goal: STG-Within one week, patient will complete visual scanning/cancellation tasks with adherence to left margin/visual field to achieve 80% accuracy provided MOD A  Outcome: Met  Goal: STG-Within one week, patient will perform single target visual scanning tasks with 100% with min-set up cuing and use of compensatory strategies.  Outcome: Met

## 2023-05-04 VITALS
BODY MASS INDEX: 24.61 KG/M2 | RESPIRATION RATE: 16 BRPM | DIASTOLIC BLOOD PRESSURE: 54 MMHG | SYSTOLIC BLOOD PRESSURE: 100 MMHG | HEIGHT: 63 IN | WEIGHT: 138.89 LBS | OXYGEN SATURATION: 94 % | TEMPERATURE: 98.1 F | HEART RATE: 60 BPM

## 2023-05-04 PROBLEM — G93.40 ENCEPHALOPATHY: Status: ACTIVE | Noted: 2023-05-04

## 2023-05-04 PROBLEM — I63.9 STROKE (CEREBRUM) (HCC): Status: ACTIVE | Noted: 2023-05-04

## 2023-05-04 PROBLEM — R41.4 LEFT-SIDED NEGLECT: Status: ACTIVE | Noted: 2023-05-04

## 2023-05-04 PROBLEM — R41.89 COGNITIVE IMPAIRMENT: Status: ACTIVE | Noted: 2023-05-04

## 2023-05-04 LAB
GAMMA INTERFERON BACKGROUND BLD IA-ACNC: 0.14 IU/ML
M TB IFN-G BLD-IMP: NEGATIVE
M TB IFN-G CD4+ BCKGRND COR BLD-ACNC: 0.01 IU/ML
MITOGEN IGNF BCKGRD COR BLD-ACNC: >10 IU/ML
QFT TB2 - NIL TBQ2: 0.04 IU/ML

## 2023-05-04 PROCEDURE — 700102 HCHG RX REV CODE 250 W/ 637 OVERRIDE(OP): Performed by: PHYSICAL MEDICINE & REHABILITATION

## 2023-05-04 PROCEDURE — 99239 HOSP IP/OBS DSCHRG MGMT >30: CPT | Performed by: PHYSICAL MEDICINE & REHABILITATION

## 2023-05-04 PROCEDURE — A9270 NON-COVERED ITEM OR SERVICE: HCPCS | Performed by: PHYSICAL MEDICINE & REHABILITATION

## 2023-05-04 RX ADMIN — OMEPRAZOLE 20 MG: 20 CAPSULE, DELAYED RELEASE ORAL at 07:45

## 2023-05-04 RX ADMIN — CETIRIZINE HYDROCHLORIDE 10 MG: 10 TABLET, FILM COATED ORAL at 07:45

## 2023-05-04 NOTE — CARE PLAN
Problem: OT Long Term Goals  Goal: LTG-By discharge, patient will complete basic self care tasks with supervision-mod I overall using AE/DME as needed.  Outcome: Not Met  Note:  Varying assist from min to max    right hemiparesis and  right neglect are primary limiters to independence   Goal: LTG-By discharge, patient will perform bathroom transfers with supervision-mod I overall using AE/DME as needed.  Outcome: Not Met  Note:  CGA to Min assist  right hemiparesis and  right neglect are primary limiters to independence

## 2023-05-04 NOTE — PROGRESS NOTES
Patient discharged to [facility] per order.  Discharge instructions reviewed with patient and Son; they verbalize understanding and signed copies placed in chart.  Patient has all belongings; signed copy of form in chart.  Patient left facility at 1000 via wheelchair accompanied by rehab staff and son.

## 2023-05-04 NOTE — DISCHARGE SUMMARY
"Admission Date: 4/22/2023    Discharge Date: 5/4/2023    Attending Provider: Christine Ji MD    Admission Diagnosis:   Active Hospital Problems    Diagnosis     Stroke (cerebrum) (HCC)     Left-sided neglect     Encephalopathy     Cognitive impairment     Rhinorrhea        Discharge Diagnosis:  Active Hospital Problems    Diagnosis     Stroke (cerebrum) (HCC)     Left-sided neglect     Encephalopathy     Cognitive impairment     Rhinorrhea        HPI per H&P:    Per Dr. Davis's consult, \"The patient is a 88 y.o. right hand dominant female with a past medical history of left sided hemorrhage in 2021;  who presented on 4/19/2023  7:07 PM as a transfer from [facility] with advanced imaging showing 5 x 4 cm right intraparenchymal hematoma.  Patient was found on the floor by her son with left facial droop.  Patient came to check on her after he called and patient reported that she cannot move her left side.  Patient was initially brought to [facility] and then transferred to [facility] for neurosurgical evaluation.  Patient was seen by neurology and found to have NIH score of 5.  CTA head today shows large right frontal parenchymal hemorrhage and left frontal parenchymal hemorrhage, both with surrounding vasogenic edema.  Patient has left visual field cut.\"    Patient was admitted to [facility] on 4/22/2023.     Rehab Hospital Course by Problem List:    Right frontal hemorrhagic stroke  (History of left frontal hemorrhagic stroke in 2021 and 2022)  Etiology secondary to amyloid angiopathy  Left neglect, continues  Left visual field cut, continues  Functional left hemiparesis, continues  Dysphagia, resolved  Nondominant  Moderate cognitive impairment, improved     Hyperlipidemia    No evidence for older patients that statins prevent heart disease, strokes, or death     Encephalopathy, resolved  Enclosure bed discontinued     Rhinorrhea, resolved  Continue cetirizine     GI " prophylaxis  Continue omeprazole as an inpatient     Abnormal thyroid studies  TSH mildly elevated at 5.7, normal T4  Outpatient follow up with PCP for repeat studies in 4-6 weeks     Bowels  Bowel incontinence, intermittent  Continue Senokot  Last bowel movement 5/1     Bladder  With mild retention, resolved  Continue to check PVRs -98, 101  IC for over 400 CC - not requiring     DVT prophylaxis  Contraindicated given hemorrhage.   Compression stockings on in am, off in pm.  Increase mobility. Monitor for swelling.  Patient currently walking 250 feet.     Functional Status at Discharge  Eating:  Supervision  Eating Description:  Set-up of equipment or meal/tube feeding, Adaptive equipment  Grooming:  Moderate Assist (for attention to L side when washing hands (seated @ w/c))  Grooming Description:  Seated in wheelchair at sink, Set-up of equipment, Supervision for safety, Verbal cueing, Increased time  Bathing:  Minimal Assist (for thoroughness, cues to incorporate LUE, balance, safety; incorporated sitting and standing w/ GB)  Bathing Description:  Grab bar, Hand held shower, Tub bench, Assit with back, Assit wtih lower extremities, Increased time, Initial preparation for task, Supervision for safety  Upper Body Dressing:  Moderate Assist (to don long sleeve turtle neck (for threading LUE, pulling shirt down and cues for deepika technique))  Upper Body Dressing Description:  Assit with threading arms through sleeves, Assist with pulling shirt over head, Increased time, Initial preparation for task, Supervision for safety, Verbal cueing  Lower Body Dressing:  Maximal Assist (for threading LLE, pants over hips pursuit (primarily on L side), cues for deepika technique, balance, safety)  Lower Body Dressing Description:  Assist with threading into pant leg, Increased time, Initial preparation for task, Supervision for safety, Verbal cueing, Grab bar  Discharge Location : Group Home  Patient Discharging with Assist of:  Caregiver (at group home)  Level of Supervision Required: 24 Hour Supervision  Recommended Equipment for Discharge: Shower Chair;Grab Bars in Tub / Shower  Recommended Services Upon Discharge: Home Health Occupational Therapy;Outpatient Occupational Therapy  Long Term Goals Not Met: supervision to mod I   1  basic self care and  2 bathroom transfers  Reason(s) for Goals Not Met: ADLs required min to max assist     transfer CGA to  min assist   right hemiparesis and  right neglect are primary limiters to independence  Criteria for Termination of Services: Maximum Function Achieved for Inpatient Rehabilitation  Walk:  Contact Guard Assist (CGA/Noemy)  Distance Walked:  50  Number of Times Distance Was Traveled:  3  Assistive Device:  None, Single Point Cane  Gait Deviation:  Decreased Base Of Support, Shuffled Gait, Decreased Heel Strike, Decreased Toe Off  Wheelchair:  Minimal Assist (initially SBA using B LE and R UE, ultimately requires physical assistance for wc mobility)  Distance Propelled:  50   Wheelchair Description:  Leg rest management, Extra time, Impaired coordination, Supervision for safety, Verbal cueing, Safety concerns, Requires incidental assist  Stairs Contact Guard Assist  Stairs Description Extra time, Hand rails, Supervision for safety, Safety concerns, Requires incidental assist, Verbal cueing (reciprocal pattern ascending step to descending, vc for sequencing)  Discharge Location: Assisted Living  Patient Discharging with Assist of: Paid Caregiver  Level of Supervision Required Upon Discharge: Twenty Four Hour Supervision  Recommended Equipment for Discharge: Single Point Cane  Recommeded Services Upon Discharge: Home Health Physical Therapy  Long Term Goals Met: 1  Long Term Goals Not Met: 3  Reason(s) for Goals Not Met: pt still requiring CGA-Noemy due to left neglect  Criteria for Termination of Services: Maximum Function Achieved for Inpatient Rehabilitation  Comprehension:   Supervision  Comprehension Description:  Glasses, Verbal cues  Expression:  Independent  Expression Description:  Verbal cueing  Social Interaction:  Independent  Social Interaction Description:  Increased time, Verbal cues, Enclosure bed  Problem Solving:  Minimal Assist  Problem Solving Description:  Verbal cueing, Therapy schedule, Seat belt, Increased time, Bed/chair alarm  Memory:  Moderate Assist  Memory Description:  Bed/chair alarm, Verbal cueing, Therapy schedule, Seat belt, Increased time  Progress since Admit: Pt has made solid progress since admission. Continues to demonstrate left neglect, cues for use of strategies in functional and paper/pencil tasks including using visual guide on left, cues to attend in functional tasks for ADLs. Pt is tolerating regular textures/thin liquids, use of mirror to self monitor anterior spillage and mild pocketing on left. No further dysphagia intervention warranted, recommend ongoing ST services via home health.  Discharge Location : Assisted Living  Patient Discharging with Assist of: Family   Level of Supervision Required: 24 Hour Supervision  Recommended Services Upon Discharge: Home Health Speech Therapy  Long Term Goals Met: 1/2  Long Term Goals Not Met: 1/2  Reason(s) for Goals Not Met: Pt is MIN A for memory and problem solving at this time.  Criteria for Termination of Services: Maximum Function Achieved for Inpatient Rehabilitation    IChristine M.D., personally performed a complete drug regimen review and no potential clinically significant medication issues were identified.     Discharge Medication:     Medication List        START taking these medications        Instructions   cetirizine 10 MG Tabs  Commonly known as: ZYRTEC  Notes to patient: Allergy medication   Take 1 Tablet by mouth every day.  Dose: 10 mg     senna-docusate 8.6-50 MG Tabs  Commonly known as: PERICOLACE or SENOKOT S  Notes to patient: Bowel medication    Take 2 Tablets by  mouth 2 times a day.  Dose: 2 Tablet            STOP taking these medications      simvastatin 10 MG Tabs  Commonly known as: ZOCOR              Discharge Diet:  Regular    Discharge Activity:  Do not return to driving until cleared by a physician.         Disposition:  Patient to discharge home with family support and community resources.     Equipment:  Follow-up & Discharge Instructions:  Home health: PT/OT/SLP/RN     Equip: none needed     Follow up: PCP, stroke bridge clinic,      Rx: [facility]      Condition on Discharge:  Good    More than 35 minutes was spent on discharging this patient, including face-to-face time, prescription management, and the dictation of this note.    Christine Ji M.D.    Date of Service: 5/4/2023

## 2023-05-04 NOTE — CARE PLAN
The patient is Stable - Low risk of patient condition declining or worsening    Shift Goals  Clinical Goals: Safety  Patient Goals: Sleep well  Family Goals: support    Progress made toward(s) clinical / shift goals:    Problem: Fall Risk - Rehab  Goal: Patient will remain free from falls  Outcome: Progressing. Patient bed in lowest locked position with bed alarm on and call light within reach. Patient calls appropriately with call light, can be impulsive at times per report but has not been over shift.      Problem: Pain - Standard  Goal: Alleviation of pain or a reduction in pain to the patient’s comfort goal  Outcome: Progressing. Patient denies having pain over shift. Has prn tylenol available.     Discharging today at 9 am, will be picked up by family and taken to [facility].

## 2023-12-24 PROBLEM — S27.0XXA TRAUMATIC PNEUMOTHORAX, INITIAL ENCOUNTER: Status: ACTIVE | Noted: 2023-12-24

## 2023-12-24 PROBLEM — S22.42XD CLOSED FRACTURE OF MULTIPLE RIBS OF LEFT SIDE WITH ROUTINE HEALING: Status: ACTIVE | Noted: 2023-12-24

## 2023-12-24 PROBLEM — R53.1 GENERALIZED WEAKNESS: Status: ACTIVE | Noted: 2023-12-24
